# Patient Record
Sex: MALE | Race: WHITE | URBAN - METROPOLITAN AREA
[De-identification: names, ages, dates, MRNs, and addresses within clinical notes are randomized per-mention and may not be internally consistent; named-entity substitution may affect disease eponyms.]

---

## 2017-03-22 ENCOUNTER — OUTPATIENT (OUTPATIENT)
Dept: OUTPATIENT SERVICES | Facility: HOSPITAL | Age: 72
LOS: 1 days | End: 2017-03-22
Payer: MEDICARE

## 2017-03-22 ENCOUNTER — TRANSCRIPTION ENCOUNTER (OUTPATIENT)
Age: 72
End: 2017-03-22

## 2017-03-22 VITALS
HEART RATE: 62 BPM | DIASTOLIC BLOOD PRESSURE: 54 MMHG | OXYGEN SATURATION: 96 % | SYSTOLIC BLOOD PRESSURE: 137 MMHG | RESPIRATION RATE: 15 BRPM

## 2017-03-22 VITALS
DIASTOLIC BLOOD PRESSURE: 61 MMHG | OXYGEN SATURATION: 97 % | HEIGHT: 67 IN | SYSTOLIC BLOOD PRESSURE: 148 MMHG | WEIGHT: 190.7 LBS | HEART RATE: 55 BPM | TEMPERATURE: 98 F | RESPIRATION RATE: 17 BRPM

## 2017-03-22 DIAGNOSIS — H25.11 AGE-RELATED NUCLEAR CATARACT, RIGHT EYE: ICD-10-CM

## 2017-03-22 PROCEDURE — C1889: CPT

## 2017-03-22 PROCEDURE — 66984 XCAPSL CTRC RMVL W/O ECP: CPT | Mod: RT

## 2017-03-22 NOTE — ASU DISCHARGE PLAN (ADULT/PEDIATRIC). - NOTIFY
Pain not relieved by Medications/Bleeding that does not stop/Swelling that continues/Persistent Nausea and Vomiting/Fever greater than 101

## 2017-06-01 ENCOUNTER — APPOINTMENT (OUTPATIENT)
Dept: NEUROSURGERY | Facility: CLINIC | Age: 72
End: 2017-06-01

## 2017-09-01 ENCOUNTER — APPOINTMENT (OUTPATIENT)
Dept: NEUROSURGERY | Facility: CLINIC | Age: 72
End: 2017-09-01
Payer: MEDICARE

## 2017-09-01 DIAGNOSIS — M19.90 UNSPECIFIED OSTEOARTHRITIS, UNSPECIFIED SITE: ICD-10-CM

## 2017-09-01 DIAGNOSIS — S86.812S STRAIN OF OTHER MUSCLE(S) AND TENDON(S) AT LOWER LEG LEVEL, LEFT LEG, SEQUELA: ICD-10-CM

## 2017-09-01 DIAGNOSIS — M24.272 DISORDER OF LIGAMENT, LEFT ANKLE: ICD-10-CM

## 2017-09-01 PROCEDURE — 99213 OFFICE O/P EST LOW 20 MIN: CPT

## 2017-09-01 RX ORDER — BESIFLOXACIN 6 MG/ML
0.6 SUSPENSION OPHTHALMIC
Qty: 5 | Refills: 0 | Status: ACTIVE | COMMUNITY
Start: 2017-03-13

## 2017-09-01 RX ORDER — METOPROLOL SUCCINATE 50 MG/1
50 TABLET, EXTENDED RELEASE ORAL
Qty: 90 | Refills: 0 | Status: ACTIVE | COMMUNITY
Start: 2016-10-06

## 2017-09-01 RX ORDER — CLOBETASOL PROPIONATE 0.5 MG/G
0.05 OINTMENT TOPICAL
Qty: 45 | Refills: 0 | Status: ACTIVE | COMMUNITY
Start: 2016-12-09

## 2017-09-01 RX ORDER — DIFLUPREDNATE 0.5 MG/ML
0.05 EMULSION OPHTHALMIC
Qty: 5 | Refills: 0 | Status: ACTIVE | COMMUNITY
Start: 2017-03-27

## 2018-07-02 ENCOUNTER — INPATIENT (INPATIENT)
Facility: HOSPITAL | Age: 73
LOS: 0 days | Discharge: LEFT BEFORE DISCHARGE | End: 2018-07-03
Attending: HOSPITALIST | Admitting: HOSPITALIST
Payer: MEDICARE

## 2018-07-02 VITALS — WEIGHT: 184.97 LBS | HEIGHT: 71 IN

## 2018-07-02 LAB
ALBUMIN SERPL ELPH-MCNC: 3.6 G/DL — SIGNIFICANT CHANGE UP (ref 3.3–5)
ALP SERPL-CCNC: 57 U/L — SIGNIFICANT CHANGE UP (ref 40–120)
ALT FLD-CCNC: 23 U/L — SIGNIFICANT CHANGE UP (ref 12–78)
ANION GAP SERPL CALC-SCNC: 8 MMOL/L — SIGNIFICANT CHANGE UP (ref 5–17)
APTT BLD: 39.9 SEC — HIGH (ref 27.5–37.4)
AST SERPL-CCNC: 24 U/L — SIGNIFICANT CHANGE UP (ref 15–37)
BASOPHILS # BLD AUTO: 0.02 K/UL — SIGNIFICANT CHANGE UP (ref 0–0.2)
BASOPHILS NFR BLD AUTO: 0.2 % — SIGNIFICANT CHANGE UP (ref 0–2)
BILIRUB SERPL-MCNC: 0.4 MG/DL — SIGNIFICANT CHANGE UP (ref 0.2–1.2)
BUN SERPL-MCNC: 15 MG/DL — SIGNIFICANT CHANGE UP (ref 7–23)
CALCIUM SERPL-MCNC: 8.3 MG/DL — LOW (ref 8.5–10.1)
CHLORIDE SERPL-SCNC: 109 MMOL/L — HIGH (ref 96–108)
CK SERPL-CCNC: 195 U/L — SIGNIFICANT CHANGE UP (ref 26–308)
CO2 SERPL-SCNC: 26 MMOL/L — SIGNIFICANT CHANGE UP (ref 22–31)
CREAT SERPL-MCNC: 0.92 MG/DL — SIGNIFICANT CHANGE UP (ref 0.5–1.3)
EOSINOPHIL # BLD AUTO: 0.13 K/UL — SIGNIFICANT CHANGE UP (ref 0–0.5)
EOSINOPHIL NFR BLD AUTO: 1.4 % — SIGNIFICANT CHANGE UP (ref 0–6)
GLUCOSE SERPL-MCNC: 89 MG/DL — SIGNIFICANT CHANGE UP (ref 70–99)
HCT VFR BLD CALC: 37.9 % — LOW (ref 39–50)
HGB BLD-MCNC: 12.6 G/DL — LOW (ref 13–17)
IMM GRANULOCYTES NFR BLD AUTO: 0.3 % — SIGNIFICANT CHANGE UP (ref 0–1.5)
INR BLD: 2.76 RATIO — HIGH (ref 0.88–1.16)
LACTATE SERPL-SCNC: 1.1 MMOL/L — SIGNIFICANT CHANGE UP (ref 0.7–2)
LYMPHOCYTES # BLD AUTO: 1.96 K/UL — SIGNIFICANT CHANGE UP (ref 1–3.3)
LYMPHOCYTES # BLD AUTO: 21.8 % — SIGNIFICANT CHANGE UP (ref 13–44)
MCHC RBC-ENTMCNC: 31.7 PG — SIGNIFICANT CHANGE UP (ref 27–34)
MCHC RBC-ENTMCNC: 33.2 GM/DL — SIGNIFICANT CHANGE UP (ref 32–36)
MCV RBC AUTO: 95.5 FL — SIGNIFICANT CHANGE UP (ref 80–100)
MONOCYTES # BLD AUTO: 1.19 K/UL — HIGH (ref 0–0.9)
MONOCYTES NFR BLD AUTO: 13.2 % — SIGNIFICANT CHANGE UP (ref 2–14)
NEUTROPHILS # BLD AUTO: 5.66 K/UL — SIGNIFICANT CHANGE UP (ref 1.8–7.4)
NEUTROPHILS NFR BLD AUTO: 63.1 % — SIGNIFICANT CHANGE UP (ref 43–77)
PLATELET # BLD AUTO: 172 K/UL — SIGNIFICANT CHANGE UP (ref 150–400)
POTASSIUM SERPL-MCNC: 3.9 MMOL/L — SIGNIFICANT CHANGE UP (ref 3.5–5.3)
POTASSIUM SERPL-SCNC: 3.9 MMOL/L — SIGNIFICANT CHANGE UP (ref 3.5–5.3)
PROT SERPL-MCNC: 7 GM/DL — SIGNIFICANT CHANGE UP (ref 6–8.3)
PROTHROM AB SERPL-ACNC: 30.4 SEC — HIGH (ref 9.8–12.7)
RBC # BLD: 3.97 M/UL — LOW (ref 4.2–5.8)
RBC # FLD: 13.7 % — SIGNIFICANT CHANGE UP (ref 10.3–14.5)
SODIUM SERPL-SCNC: 143 MMOL/L — SIGNIFICANT CHANGE UP (ref 135–145)
TROPONIN I SERPL-MCNC: <0.015 NG/ML — SIGNIFICANT CHANGE UP (ref 0.01–0.04)
WBC # BLD: 8.99 K/UL — SIGNIFICANT CHANGE UP (ref 3.8–10.5)
WBC # FLD AUTO: 8.99 K/UL — SIGNIFICANT CHANGE UP (ref 3.8–10.5)

## 2018-07-02 PROCEDURE — 99285 EMERGENCY DEPT VISIT HI MDM: CPT

## 2018-07-02 PROCEDURE — 70450 CT HEAD/BRAIN W/O DYE: CPT | Mod: 26

## 2018-07-02 PROCEDURE — 71045 X-RAY EXAM CHEST 1 VIEW: CPT | Mod: 26

## 2018-07-02 RX ORDER — LEVETIRACETAM 250 MG/1
1000 TABLET, FILM COATED ORAL ONCE
Qty: 0 | Refills: 0 | Status: COMPLETED | OUTPATIENT
Start: 2018-07-02 | End: 2018-07-02

## 2018-07-02 RX ADMIN — LEVETIRACETAM 400 MILLIGRAM(S): 250 TABLET, FILM COATED ORAL at 22:06

## 2018-07-02 NOTE — ED PROVIDER NOTE - PHYSICAL EXAMINATION
Constitutional: mild distress AAOx3  Eyes: PERRLA EOMI  Head: Normocephalic atraumatic  Mouth: MMM  Cardiac: regular rate   Resp: Lungs CTAB  GI: Abd s/nt/nd  Neuro: CN2-12 intact normal strength sensation and coordination  Skin: No rashes

## 2018-07-02 NOTE — ED PROVIDER NOTE - NS ED ROS FT
Constitutional: No fever or chills  Eyes: No visual changes  HEENT: No throat pain  CV: No chest pain  Resp: No SOB no cough  GI: No abd pain, nausea or vomiting  : No dysuria  MSK: No musculoskeletal pain  Skin: No rash  Neuro: + headache + seizure

## 2018-07-02 NOTE — ED ADULT NURSE NOTE - PLAN OF CARE
Call bell/Explanation of exam/test/Seizure precautions/Bedside visitors/Position of comfort/Fall precautions/Side rails

## 2018-07-02 NOTE — ED PROVIDER NOTE - MEDICAL DECISION MAKING DETAILS
74 y/o male with a PMHx of psoriatic arthritis, knee replacement, back surgery, arrythmia, Afib, CVA on Coumadin presents to the ED c/o seizures. Pt notes he was flying back from [place] 2 days ago, went to use the restroom and came back to the seat. As per wife, pt began to start twitching and moving upper extremities. A physician on board laid the patient down and plane had emergency landing. Pt was taken to [facility] with no significant findings. This morning, pt was getting ready to see cardiologist and had another seizure although was lucid and speaking to wife during the episode. As per wife, episode lasted 1 minute. After showering, pt then fell on the floor, he states he fell from slipping on the mat. Pt then saw Neuro today and had another seizure while at the office. Neuro reports pt had lip smacking, right gaze deviation and abnormal movement of left hand, episode lasting 1 minute with no post-ictal period. Neuro sent pt to ED and is requesting MRI and EEG. Pt is c/o +HA +post ictal confusion after 1st episode +numbness in upper extremity, not currently experiencing. Denies SOB, CP, fever, abd pain, NVD, urinary incontinence, or tongue biting during seizure FMHx of CVA. PCP Dr. Palacio Cardio Dr. Petit Neuro Dr. Headley On exam, pt with cranial nerves II-VII intact, normal strength, sensation and coordination, abd soft and nontender, regular rate and rhythm, no LE edema. Given witnessed seizure by neurologist, will obtain labs, CT head and admit for EEG and MRI.

## 2018-07-02 NOTE — ED ADULT NURSE NOTE - EXPLANATION OF PATIENT'S REASON FOR LEAVING
pt states he does not want to be admitted, pt states he feels that he was not having seizures, pt's wife states she can have him follow up with a neurologist once he leaves the emergency room

## 2018-07-02 NOTE — ED PROVIDER NOTE - NS_ ATTENDINGSCRIBEDETAILS _ED_A_ED_FT
I, Jeremi Martinez MD,  performed the initial face to face bedside interview with this patient regarding history of present illness, review of symptoms and relevant past medical, social and family history.  I completed an independent physical examination.  I was the initial provider who evaluated this patient.  The history, relevant review of systems, past medical and surgical history, medical decision making, and physical examination was documented by the scribe in my presence and I attest to the accuracy of the documentation.

## 2018-07-02 NOTE — ED ADULT NURSE NOTE - CHIEF COMPLAINT QUOTE
Pt. to the ED BIB Spouse C/O seizures - Pt. sent by PCP for further evaluation of Seizures that started on 6/30- Wife states he was evaluated in [facility] and was D/C home to F/U with PCP- Pt. referenced to the ED for MRI and Possible EGG- Wife denies seizure hx , but states patient has been having seizures everyday since 6/30/2018- Pt. states he had mechanical fall this morning but landed on hands- denies hitting head and LOC, but denies it was related to seizure activity and wife confirms- + cardiac hx on Coumadin-

## 2018-07-02 NOTE — ED PROVIDER NOTE - OBJECTIVE STATEMENT
72 y/o male with a PMHx of psoriatic arthritis, knee replacement, back surgery, arrythmia, Afib, CVA on Coumadin presents to the ED c/o seizures. Pt notes he was flying back from [place] 2 days ago, went to use the restroom and came back to the seat. As per wife, pt began to start twitching and moving upper extremities. A physician on board laid the patient down and plane had emergency landing. Pt was taken to [facility] with no significant findings. This morning, pt was getting ready to see cardiologist and had another seizure although was lucid and speaking to wife during the episode. As per wife, episode lasted 1 minute. After showering, pt then fell on the floor, he states he fell from slipping on the mat. Pt then saw Neuro today and had another seizure while at the office. Neuro reports pt had lip smacking, right gaze deviation and abnormal movement of left hand, episode lasting 1 minute with no post-ictal period. Neuro sent pt to ED and is requesting MRI and EEG. Pt is c/o +HA +post ictal confusion after 1st episode +numbness in upper extremity, not currently experiencing. Denies SOB, CP, fever, abd pain, NVD, urinary incontinence, or tongue biting during seizure FMHx of CVA. PCP Dr. Palacio Cardio Dr. Petit Neuro Dr. Headley

## 2018-07-02 NOTE — ED ADULT NURSE NOTE - CHPI ED SYMPTOMS NEG
no numbness/no change in level of consciousness/no weakness/no confusion/no vomiting/no dizziness/no fever/no blurred vision/no loss of consciousness/no nausea

## 2018-07-03 VITALS
TEMPERATURE: 99 F | OXYGEN SATURATION: 100 % | SYSTOLIC BLOOD PRESSURE: 168 MMHG | RESPIRATION RATE: 17 BRPM | HEART RATE: 69 BPM | DIASTOLIC BLOOD PRESSURE: 87 MMHG

## 2018-07-03 RX ORDER — OXYCODONE HYDROCHLORIDE 5 MG/1
10 TABLET ORAL EVERY 8 HOURS
Qty: 0 | Refills: 0 | Status: DISCONTINUED | OUTPATIENT
Start: 2018-07-03 | End: 2018-07-03

## 2018-07-03 RX ORDER — METOPROLOL TARTRATE 50 MG
50 TABLET ORAL DAILY
Qty: 0 | Refills: 0 | Status: DISCONTINUED | OUTPATIENT
Start: 2018-07-03 | End: 2018-07-03

## 2018-07-03 RX ORDER — ACETAMINOPHEN 500 MG
650 TABLET ORAL EVERY 6 HOURS
Qty: 0 | Refills: 0 | Status: DISCONTINUED | OUTPATIENT
Start: 2018-07-03 | End: 2018-07-03

## 2018-07-03 RX ORDER — METHOTREXATE 2.5 MG/1
20 TABLET ORAL
Qty: 0 | Refills: 0 | Status: DISCONTINUED | OUTPATIENT
Start: 2018-07-03 | End: 2018-07-03

## 2018-07-03 RX ORDER — FOLIC ACID 0.8 MG
1 TABLET ORAL DAILY
Qty: 0 | Refills: 0 | Status: DISCONTINUED | OUTPATIENT
Start: 2018-07-03 | End: 2018-07-03

## 2018-07-03 RX ORDER — WARFARIN SODIUM 2.5 MG/1
3.5 TABLET ORAL DAILY
Qty: 0 | Refills: 0 | Status: DISCONTINUED | OUTPATIENT
Start: 2018-07-03 | End: 2018-07-03

## 2018-07-03 RX ORDER — GABAPENTIN 400 MG/1
300 CAPSULE ORAL DAILY
Qty: 0 | Refills: 0 | Status: DISCONTINUED | OUTPATIENT
Start: 2018-07-03 | End: 2018-07-03

## 2018-07-03 RX ORDER — SIMVASTATIN 20 MG/1
40 TABLET, FILM COATED ORAL AT BEDTIME
Qty: 0 | Refills: 0 | Status: DISCONTINUED | OUTPATIENT
Start: 2018-07-03 | End: 2018-07-03

## 2018-07-03 RX ORDER — ACETAMINOPHEN 500 MG
650 TABLET ORAL ONCE
Qty: 0 | Refills: 0 | Status: COMPLETED | OUTPATIENT
Start: 2018-07-03 | End: 2018-07-03

## 2018-07-03 NOTE — H&P ADULT - ASSESSMENT
73 M pmh Afib on coumadin, Psoriatic arthritis on MTX, back surgery/knee surgery on chronic pain meds, p/w new onset seizures. Recent flight from [place] approx 48 hrs ago where he was examined by physician on board noted apparent seizureliek activity. Emergency landing was made and was tx at [facility]. Underwent 2 head CT and discharged w/o any meds per patient. Today was scheduled to follow up with neurologist for first time visit with Dr hinojosa where he has witnessed seizure described as lip smacking, right gaze deviation and L arm/hand tremor lasting approx 1 min. Wife stated this am she beleives he also had seizure at home that lasted approx 1 min and was confused and at the time he complaiend of UE numbness abd HA. He presently has no complaints and is eager to go home.   He denies any new meds. He denies fevers. Visual disturbances, No N/V/D, No urinary incontinence. He is upset that he needs to undergo further evaluation and wants to leave at once. I have explained all the risks towards leaving without further evaluation which include injury (as patient admits to driving) and injury to others and death. He is alert and oriented x 3.    I have notified SW and patients wife who will further discuss the matter with the patient. Should he leave, patient has been informed it will be AMA. Wife has been strictly advised that patient cannot drive. Patient also aware that driving is not permitted and was strictly enforced in the presence of ED nurse Teodora.       IF patient decided to be admitted:    A:  New onset seizures of unknown etiology  Afib on coumadin  PA on MTX  CBP    P:  admit to med surg  MRI head, EEG, neuro consult  frequent neuro checks, neuro/fall precautions. CTH WNL  Kepppra 500 BID, ativan PRN for breakthrough seizures  INR therapeutic

## 2018-07-03 NOTE — H&P ADULT - HISTORY OF PRESENT ILLNESS
73 M pmh Afib on coumadin, Psoriatic arthritis on MTX, back surgery/knee surgery on chronic pain meds, p/w new onset seizures. Recent flight from [place] approx 48 hrs ago where he was examined by physician on board noted apparent seizureliek activity. Emergency landing was made and was tx at [facility]. Underwent 2 head CT and discharged w/o any meds per patient. Today was scheduled to follow up with neurologist for first time visit with Dr hinojosa where he has witnessed seizure described as lip smacking, right gaze deviation and L arm/hand tremor lasting approx 1 min. Wife stated this am she beleives he also had seizure at home that lasted approx 1 min and was confused and at the time he complaiend of UE numbness abd HA. He presently has no complaints and is eager to go home.   He denies any new meds. He denies fevers. Visual disturbances, No N/V/D, No urinary incontinence. He is upset that he needs to undergo further evaluation and wants to leave at once. I have explained all the risks towards leaving without further evaluation which include injury (as patient admits to driving) and injury to others and death. He is alert and oriented x 3.    I have notified SW and patients wife who will further discuss the matter with the patient. Should he leave, patient has been informed it will be AMA. Wife has been strictly advised that patient cannot drive. Patient also aware that driving is not permitted and was strictly enforced in the presence of ED nurse Teodora.       social: former smoker, social etoh, no drug  Shx: knee and back surgery  Fhx: family history of CVA

## 2018-07-03 NOTE — H&P ADULT - NSHPPHYSICALEXAM_GEN_ALL_CORE
PHYSICAL EXAM:    Constitutional: NAD, awake and alert, well-developed  HEENT: PERR, EOMI, Normal Hearing, MMM  Neck: Soft and supple, No LAD, No JVD  Respiratory: Breath sounds are clear bilaterally, No wheezing, rales or rhonchi  Cardiovascular: S1 and S2, regular rate and rhythm, no Murmurs, gallops or rubs  Gastrointestinal: Bowel Sounds present, soft, nontender, nondistended, no guarding, no rebound  Extremities: No peripheral edema  Vascular: 2+ peripheral pulses  Neurological: A/O x 3, no focal deficits, CN II_XII in tact, no gross motor deficits  Musculoskeletal: 5/5 strength b/l upper and lower extremities  Skin: No rashes

## 2018-07-03 NOTE — H&P ADULT - NSHPLABSRESULTS_GEN_ALL_CORE
LABS: All Labs Reviewed:                        12.6   8.99  )-----------( 172      ( 02 Jul 2018 20:16 )             37.9     07-02    143  |  109<H>  |  15  ----------------------------<  89  3.9   |  26  |  0.92    Ca    8.3<L>      02 Jul 2018 20:16    TPro  7.0  /  Alb  3.6  /  TBili  0.4  /  DBili  x   /  AST  24  /  ALT  23  /  AlkPhos  57  07-02    PT/INR - ( 02 Jul 2018 20:16 )   PT: 30.4 sec;   INR: 2.76 ratio         PTT - ( 02 Jul 2018 20:16 )  PTT:39.9 sec  CARDIAC MARKERS ( 02 Jul 2018 20:16 )  <0.015 ng/mL / x     / 195 U/L / x     / x              Blood Culture:

## 2018-07-10 DIAGNOSIS — Z79.01 LONG TERM (CURRENT) USE OF ANTICOAGULANTS: ICD-10-CM

## 2018-07-10 DIAGNOSIS — Z86.73 PERSONAL HISTORY OF TRANSIENT ISCHEMIC ATTACK (TIA), AND CEREBRAL INFARCTION WITHOUT RESIDUAL DEFICITS: ICD-10-CM

## 2018-07-10 DIAGNOSIS — Z96.659 PRESENCE OF UNSPECIFIED ARTIFICIAL KNEE JOINT: ICD-10-CM

## 2018-07-10 DIAGNOSIS — Z87.891 PERSONAL HISTORY OF NICOTINE DEPENDENCE: ICD-10-CM

## 2018-07-10 DIAGNOSIS — I48.0 PAROXYSMAL ATRIAL FIBRILLATION: ICD-10-CM

## 2018-07-10 DIAGNOSIS — R56.9 UNSPECIFIED CONVULSIONS: ICD-10-CM

## 2018-07-10 DIAGNOSIS — L40.50 ARTHROPATHIC PSORIASIS, UNSPECIFIED: ICD-10-CM

## 2020-08-27 NOTE — H&P ADULT - PMH
b/l lower extremity twitching. progressed into full body tremors. HR 120s. VSS. pupils dilated Atrial fibrillation  paroxysmal  Cardiac arrhythmia  PAT  Cerebrovascular accident  2011

## 2020-10-23 PROBLEM — I49.9 CARDIAC ARRHYTHMIA, UNSPECIFIED: Chronic | Status: ACTIVE | Noted: 2017-03-22

## 2020-10-23 PROBLEM — I48.91 UNSPECIFIED ATRIAL FIBRILLATION: Chronic | Status: ACTIVE | Noted: 2017-03-22

## 2020-10-27 ENCOUNTER — OUTPATIENT (OUTPATIENT)
Dept: OUTPATIENT SERVICES | Facility: HOSPITAL | Age: 75
LOS: 1 days | End: 2020-10-27
Payer: MEDICARE

## 2020-10-27 DIAGNOSIS — M12.261 VILLONODULAR SYNOVITIS (PIGMENTED), RIGHT KNEE: ICD-10-CM

## 2020-10-27 PROCEDURE — 78830 RP LOCLZJ TUM SPECT W/CT 1: CPT | Mod: 26

## 2020-10-27 PROCEDURE — A9561: CPT

## 2020-10-27 PROCEDURE — 78315 BONE IMAGING 3 PHASE: CPT | Mod: 26

## 2020-10-27 PROCEDURE — 78830 RP LOCLZJ TUM SPECT W/CT 1: CPT

## 2020-10-27 PROCEDURE — 78315 BONE IMAGING 3 PHASE: CPT

## 2020-10-28 DIAGNOSIS — M12.261 VILLONODULAR SYNOVITIS (PIGMENTED), RIGHT KNEE: ICD-10-CM

## 2021-05-12 ENCOUNTER — OUTPATIENT (OUTPATIENT)
Dept: OUTPATIENT SERVICES | Facility: HOSPITAL | Age: 76
LOS: 1 days | End: 2021-05-12
Payer: MEDICARE

## 2021-05-12 VITALS
DIASTOLIC BLOOD PRESSURE: 60 MMHG | RESPIRATION RATE: 16 BRPM | WEIGHT: 190.92 LBS | HEART RATE: 70 BPM | HEIGHT: 69 IN | TEMPERATURE: 98 F | OXYGEN SATURATION: 96 % | SYSTOLIC BLOOD PRESSURE: 100 MMHG

## 2021-05-12 DIAGNOSIS — M17.12 UNILATERAL PRIMARY OSTEOARTHRITIS, LEFT KNEE: ICD-10-CM

## 2021-05-12 DIAGNOSIS — Z01.818 ENCOUNTER FOR OTHER PREPROCEDURAL EXAMINATION: ICD-10-CM

## 2021-05-12 DIAGNOSIS — Z98.49 CATARACT EXTRACTION STATUS, UNSPECIFIED EYE: Chronic | ICD-10-CM

## 2021-05-12 DIAGNOSIS — Z29.9 ENCOUNTER FOR PROPHYLACTIC MEASURES, UNSPECIFIED: ICD-10-CM

## 2021-05-12 DIAGNOSIS — Z96.651 PRESENCE OF RIGHT ARTIFICIAL KNEE JOINT: Chronic | ICD-10-CM

## 2021-05-12 DIAGNOSIS — Z90.89 ACQUIRED ABSENCE OF OTHER ORGANS: Chronic | ICD-10-CM

## 2021-05-12 DIAGNOSIS — Z98.1 ARTHRODESIS STATUS: Chronic | ICD-10-CM

## 2021-05-12 LAB
A1C WITH ESTIMATED AVERAGE GLUCOSE RESULT: 5.8 % — HIGH (ref 4–5.6)
ANION GAP SERPL CALC-SCNC: 4 MMOL/L — LOW (ref 5–17)
APTT BLD: 35.9 SEC — HIGH (ref 27.5–35.5)
BASOPHILS # BLD AUTO: 0.03 K/UL — SIGNIFICANT CHANGE UP (ref 0–0.2)
BASOPHILS NFR BLD AUTO: 0.4 % — SIGNIFICANT CHANGE UP (ref 0–2)
BUN SERPL-MCNC: 22 MG/DL — SIGNIFICANT CHANGE UP (ref 7–23)
CALCIUM SERPL-MCNC: 8.8 MG/DL — SIGNIFICANT CHANGE UP (ref 8.5–10.1)
CHLORIDE SERPL-SCNC: 106 MMOL/L — SIGNIFICANT CHANGE UP (ref 96–108)
CO2 SERPL-SCNC: 27 MMOL/L — SIGNIFICANT CHANGE UP (ref 22–31)
CREAT SERPL-MCNC: 0.92 MG/DL — SIGNIFICANT CHANGE UP (ref 0.5–1.3)
EOSINOPHIL # BLD AUTO: 0.23 K/UL — SIGNIFICANT CHANGE UP (ref 0–0.5)
EOSINOPHIL NFR BLD AUTO: 3.2 % — SIGNIFICANT CHANGE UP (ref 0–6)
ESTIMATED AVERAGE GLUCOSE: 120 MG/DL — HIGH (ref 68–114)
GLUCOSE SERPL-MCNC: 94 MG/DL — SIGNIFICANT CHANGE UP (ref 70–99)
HCT VFR BLD CALC: 41.6 % — SIGNIFICANT CHANGE UP (ref 39–50)
HGB BLD-MCNC: 13.6 G/DL — SIGNIFICANT CHANGE UP (ref 13–17)
IMM GRANULOCYTES NFR BLD AUTO: 0.4 % — SIGNIFICANT CHANGE UP (ref 0–1.5)
INR BLD: 1.39 RATIO — HIGH (ref 0.88–1.16)
LYMPHOCYTES # BLD AUTO: 1.17 K/UL — SIGNIFICANT CHANGE UP (ref 1–3.3)
LYMPHOCYTES # BLD AUTO: 16.4 % — SIGNIFICANT CHANGE UP (ref 13–44)
MCHC RBC-ENTMCNC: 31.8 PG — SIGNIFICANT CHANGE UP (ref 27–34)
MCHC RBC-ENTMCNC: 32.7 GM/DL — SIGNIFICANT CHANGE UP (ref 32–36)
MCV RBC AUTO: 97.2 FL — SIGNIFICANT CHANGE UP (ref 80–100)
MONOCYTES # BLD AUTO: 0.76 K/UL — SIGNIFICANT CHANGE UP (ref 0–0.9)
MONOCYTES NFR BLD AUTO: 10.7 % — SIGNIFICANT CHANGE UP (ref 2–14)
MRSA PCR RESULT.: SIGNIFICANT CHANGE UP
NEUTROPHILS # BLD AUTO: 4.9 K/UL — SIGNIFICANT CHANGE UP (ref 1.8–7.4)
NEUTROPHILS NFR BLD AUTO: 68.9 % — SIGNIFICANT CHANGE UP (ref 43–77)
PLATELET # BLD AUTO: 157 K/UL — SIGNIFICANT CHANGE UP (ref 150–400)
POTASSIUM SERPL-MCNC: 4.6 MMOL/L — SIGNIFICANT CHANGE UP (ref 3.5–5.3)
POTASSIUM SERPL-SCNC: 4.6 MMOL/L — SIGNIFICANT CHANGE UP (ref 3.5–5.3)
PROTHROM AB SERPL-ACNC: 15.9 SEC — HIGH (ref 10.6–13.6)
RBC # BLD: 4.28 M/UL — SIGNIFICANT CHANGE UP (ref 4.2–5.8)
RBC # FLD: 12.9 % — SIGNIFICANT CHANGE UP (ref 10.3–14.5)
S AUREUS DNA NOSE QL NAA+PROBE: SIGNIFICANT CHANGE UP
SODIUM SERPL-SCNC: 137 MMOL/L — SIGNIFICANT CHANGE UP (ref 135–145)
WBC # BLD: 7.12 K/UL — SIGNIFICANT CHANGE UP (ref 3.8–10.5)
WBC # FLD AUTO: 7.12 K/UL — SIGNIFICANT CHANGE UP (ref 3.8–10.5)

## 2021-05-12 PROCEDURE — 86900 BLOOD TYPING SEROLOGIC ABO: CPT

## 2021-05-12 PROCEDURE — 85730 THROMBOPLASTIN TIME PARTIAL: CPT

## 2021-05-12 PROCEDURE — 71046 X-RAY EXAM CHEST 2 VIEWS: CPT | Mod: 26

## 2021-05-12 PROCEDURE — 83036 HEMOGLOBIN GLYCOSYLATED A1C: CPT

## 2021-05-12 PROCEDURE — 86901 BLOOD TYPING SEROLOGIC RH(D): CPT

## 2021-05-12 PROCEDURE — 86850 RBC ANTIBODY SCREEN: CPT

## 2021-05-12 PROCEDURE — 85610 PROTHROMBIN TIME: CPT

## 2021-05-12 PROCEDURE — 93010 ELECTROCARDIOGRAM REPORT: CPT

## 2021-05-12 PROCEDURE — 93005 ELECTROCARDIOGRAM TRACING: CPT

## 2021-05-12 PROCEDURE — 85025 COMPLETE CBC W/AUTO DIFF WBC: CPT

## 2021-05-12 PROCEDURE — 82040 ASSAY OF SERUM ALBUMIN: CPT

## 2021-05-12 PROCEDURE — G0463: CPT | Mod: 25

## 2021-05-12 PROCEDURE — 71046 X-RAY EXAM CHEST 2 VIEWS: CPT

## 2021-05-12 PROCEDURE — 36415 COLL VENOUS BLD VENIPUNCTURE: CPT

## 2021-05-12 PROCEDURE — 87640 STAPH A DNA AMP PROBE: CPT

## 2021-05-12 PROCEDURE — 80048 BASIC METABOLIC PNL TOTAL CA: CPT

## 2021-05-12 PROCEDURE — 87641 MR-STAPH DNA AMP PROBE: CPT

## 2021-05-12 RX ORDER — HYDROCORTISONE 1 %
0 OINTMENT (GRAM) TOPICAL
Qty: 0 | Refills: 0 | DISCHARGE

## 2021-05-12 RX ORDER — ACETAMINOPHEN 500 MG
1 TABLET ORAL
Qty: 0 | Refills: 0 | DISCHARGE

## 2021-05-12 RX ORDER — METHOTREXATE 2.5 MG/1
20 TABLET ORAL
Qty: 0 | Refills: 0 | DISCHARGE

## 2021-05-12 RX ORDER — WARFARIN SODIUM 2.5 MG/1
3.5 TABLET ORAL
Qty: 0 | Refills: 0 | DISCHARGE

## 2021-05-12 RX ORDER — GABAPENTIN 400 MG/1
1 CAPSULE ORAL
Qty: 0 | Refills: 0 | DISCHARGE

## 2021-05-12 RX ORDER — METOPROLOL TARTRATE 50 MG
1 TABLET ORAL
Qty: 0 | Refills: 0 | DISCHARGE

## 2021-05-12 NOTE — H&P PST ADULT - ASSESSMENT
76 year old female with OA left knee c/o left knee pain with ROM which wakes him up from sleep ; takes oxycodone for pain; he presents to PST for planned Left TKR     Plan:  1. PST instructions given ; NPO status instructions to be given by ASU   2. Pt instructed to take following meds with sip of water : metoprolol keppra   3. Pt instructed to take routine evening medications unless indicated   4. Stop NSAIDS ( Aspirin Alev Motrin Mobic Diclofenac), herbal supplements , MVI , Vitamin fish oil 7 days prior to surgery  unless   directed by surgeon or cardiologist;   5. Medical Optimization  with Dr Bruno Palacio Cardio Dr Brandt   6. EZ wash instructions given & mupirocin instructions given  7. Labs EKG CXR as per surgeon request   8. Pt instructed to self quarantine after Covid test   9. Covid Testing scheduled Pt notified and aware  10. Pt denies covid symptoms shortness of breath fever cough       CAPRINI SCORE [CLOT]    AGE RELATED RISK FACTORS                                                       MOBILITY RELATED FACTORS  [ ] Age 41-60 years                                            (1 Point)                  [ ] Bed rest                                                        (1 Point)  [ ] Age: 61-74 years                                           (2 Points)                 [ ] Plaster cast                                                   (2 Points)  [x ] Age= 75 years                                              (3 Points)                 [ ] Bed bound for more than 72 hours                 (2 Points)    DISEASE RELATED RISK FACTORS                                               GENDER SPECIFIC FACTORS  [ ] Edema in the lower extremities                       (1 Point)                  [ ] Pregnancy                                                     (1 Point)  [ ] Varicose veins                                               (1 Point)                  [ ] Post-partum < 6 weeks                                   (1 Point)             [x ] BMI > 25 Kg/m2                                            (1 Point)                  [ ] Hormonal therapy  or oral contraception          (1 Point)                 [ ] Sepsis (in the previous month)                        (1 Point)                  [ ] History of pregnancy complications                 (1 point)  [ ] Pneumonia or serious lung disease                                               [ ] Unexplained or recurrent                     (1 Point)           (in the previous month)                               (1 Point)  [ ] Abnormal pulmonary function test                     (1 Point)                 SURGERY RELATED RISK FACTORS  [ ] Acute myocardial infarction                              (1 Point)                 [ ]  Section                                             (1 Point)  [ ] Congestive heart failure (in the previous month)  (1 Point)               [ ] Minor surgery                                                  (1 Point)   [ ] Inflammatory bowel disease                             (1 Point)                 [ ] Arthroscopic surgery                                        (2 Points)  [ ] Central venous access                                      (2 Points)                [ ] General surgery lasting more than 45 minutes   (2 Points)       [ ] Stroke (in the previous month)                          (5 Points)               [x ] Elective arthroplasty                                         (5 Points)            ( ) presents and past malignancy                           (2 points)                                                                                                         HEMATOLOGY RELATED FACTORS                                                 TRAUMA RELATED RISK FACTORS  [ ] Prior episodes of VTE                                     (3 Points)                 [ ] Fracture of the hip, pelvis, or leg                       (5 Points)  [ ] Positive family history for VTE                         (3 Points)                 [ ] Acute spinal cord injury (in the previous month)  (5 Points)  [ ] Prothrombin 34432 A                                     (3 Points)                 [ ] Paralysis  (less than 1 month)                             (5 Points)  [ ] Factor V Leiden                                             (3 Points)                  [ ] Multiple Trauma within 1 month                        (5 Points)  [ ] Lupus anticoagulants                                     (3 Points)                                                           [ ] Anticardiolipin antibodies                               (3 Points)                                                       [ ] High homocysteine in the blood                      (3 Points)                                             [ ] Other congenital or acquired thrombophilia      (3 Points)                                                [ ] Heparin induced thrombocytopenia                  (3 Points)                                          Total Score [     9     ]   76 year old female with OA left knee c/o left knee pain with ROM which wakes him up from sleep ; takes oxycodone for pain; he presents to PST for planned Left TKR     Plan:  1. PST instructions given ; NPO status instructions to be given by ASU   2. Pt instructed to take following meds with sip of water : metoprolol keppra   3. Pt instructed to take routine evening medications unless indicated   4. Stop NSAIDS ( Aspirin Alev Motrin Mobic Diclofenac), herbal supplements , MVI , Vitamin fish oil 7 days prior to surgery  unless   directed by surgeon or cardiologist;   5. Medical Optimization  with Dr Bruno Palacio Cardio Dr Brandt   6. EZ wash instructions given & mupirocin instructions given  7. Labs EKG CXR as per surgeon request   8. Pt instructed to self quarantine after Covid test   9. Covid Testing scheduled Pt notified and aware  10. Pt denies covid symptoms shortness of breath fever cough   11. OR booking notified Opiate dependent Martha aware      CAPRINI SCORE [CLOT]    AGE RELATED RISK FACTORS                                                       MOBILITY RELATED FACTORS  [ ] Age 41-60 years                                            (1 Point)                  [ ] Bed rest                                                        (1 Point)  [ ] Age: 61-74 years                                           (2 Points)                 [ ] Plaster cast                                                   (2 Points)  [x ] Age= 75 years                                              (3 Points)                 [ ] Bed bound for more than 72 hours                 (2 Points)    DISEASE RELATED RISK FACTORS                                               GENDER SPECIFIC FACTORS  [ ] Edema in the lower extremities                       (1 Point)                  [ ] Pregnancy                                                     (1 Point)  [ ] Varicose veins                                               (1 Point)                  [ ] Post-partum < 6 weeks                                   (1 Point)             [x ] BMI > 25 Kg/m2                                            (1 Point)                  [ ] Hormonal therapy  or oral contraception          (1 Point)                 [ ] Sepsis (in the previous month)                        (1 Point)                  [ ] History of pregnancy complications                 (1 point)  [ ] Pneumonia or serious lung disease                                               [ ] Unexplained or recurrent                     (1 Point)           (in the previous month)                               (1 Point)  [ ] Abnormal pulmonary function test                     (1 Point)                 SURGERY RELATED RISK FACTORS  [ ] Acute myocardial infarction                              (1 Point)                 [ ]  Section                                             (1 Point)  [ ] Congestive heart failure (in the previous month)  (1 Point)               [ ] Minor surgery                                                  (1 Point)   [ ] Inflammatory bowel disease                             (1 Point)                 [ ] Arthroscopic surgery                                        (2 Points)  [ ] Central venous access                                      (2 Points)                [ ] General surgery lasting more than 45 minutes   (2 Points)       [ ] Stroke (in the previous month)                          (5 Points)               [x ] Elective arthroplasty                                         (5 Points)            ( ) presents and past malignancy                           (2 points)                                                                                                         HEMATOLOGY RELATED FACTORS                                                 TRAUMA RELATED RISK FACTORS  [ ] Prior episodes of VTE                                     (3 Points)                 [ ] Fracture of the hip, pelvis, or leg                       (5 Points)  [ ] Positive family history for VTE                         (3 Points)                 [ ] Acute spinal cord injury (in the previous month)  (5 Points)  [ ] Prothrombin 68017 A                                     (3 Points)                 [ ] Paralysis  (less than 1 month)                             (5 Points)  [ ] Factor V Leiden                                             (3 Points)                  [ ] Multiple Trauma within 1 month                        (5 Points)  [ ] Lupus anticoagulants                                     (3 Points)                                                           [ ] Anticardiolipin antibodies                               (3 Points)                                                       [ ] High homocysteine in the blood                      (3 Points)                                             [ ] Other congenital or acquired thrombophilia      (3 Points)                                                [ ] Heparin induced thrombocytopenia                  (3 Points)                                          Total Score [     9     ]

## 2021-05-12 NOTE — H&P PST ADULT - NSICDXFAMILYHX_GEN_ALL_CORE_FT
FAMILY HISTORY:  Father  Still living? Unknown  FH: arthritis, Age at diagnosis: Age Unknown  FH: stroke, Age at diagnosis: Age Unknown

## 2021-05-12 NOTE — H&P PST ADULT - NSICDXPASTMEDICALHX_GEN_ALL_CORE_FT
PAST MEDICAL HISTORY:  Atrial fibrillation paroxysmal    Cardiac arrhythmia PAT    Cerebrovascular accident 2011 deficit: balance dizziness and weakness both hands    Psoriatic arthritis     Seizure disorder last seizure 2019     PAST MEDICAL HISTORY:  Atrial fibrillation paroxysmal    Cardiac arrhythmia PAT    Cerebrovascular accident 2011 deficit: balance dizziness and weakness both hands    Psoriatic arthritis     Rib fracture left side  Last week of April 2021    Seizure disorder last seizure 2019

## 2021-05-12 NOTE — H&P PST ADULT - NSICDXPASTSURGICALHX_GEN_ALL_CORE_FT
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

## 2021-05-12 NOTE — H&P PST ADULT - HEALTH CARE MAINTENANCE
covid vaccine x2 Moderna   [place] 14 days Pt denies  travel internationally for the past 21 days

## 2021-05-12 NOTE — H&P PST ADULT - NSANTHOSAYNRD_GEN_A_CORE
No. VICTORIA screening performed.  STOP BANG Legend: 0-2 = LOW Risk; 3-4 = INTERMEDIATE Risk; 5-8 = HIGH Risk

## 2021-05-12 NOTE — H&P PST ADULT - HISTORY OF PRESENT ILLNESS
76 year old female with OA left knee c/o left knee pain with ROM which wakes him up from sleep ; takes oxycodone for pain; he presents to PST for planned Left TKR

## 2021-05-13 DIAGNOSIS — M17.12 UNILATERAL PRIMARY OSTEOARTHRITIS, LEFT KNEE: ICD-10-CM

## 2021-05-13 DIAGNOSIS — Z01.818 ENCOUNTER FOR OTHER PREPROCEDURAL EXAMINATION: ICD-10-CM

## 2021-05-16 DIAGNOSIS — Z01.818 ENCOUNTER FOR OTHER PREPROCEDURAL EXAMINATION: ICD-10-CM

## 2021-05-17 ENCOUNTER — APPOINTMENT (OUTPATIENT)
Dept: DISASTER EMERGENCY | Facility: CLINIC | Age: 76
End: 2021-05-17

## 2021-05-18 LAB — SARS-COV-2 N GENE NPH QL NAA+PROBE: NOT DETECTED

## 2021-05-20 ENCOUNTER — INPATIENT (INPATIENT)
Facility: HOSPITAL | Age: 76
LOS: 3 days | Discharge: NURSING, REHAB OR LONG-TERM CARE | DRG: 470 | End: 2021-05-24
Attending: ORTHOPAEDIC SURGERY | Admitting: ORTHOPAEDIC SURGERY
Payer: MEDICARE

## 2021-05-20 ENCOUNTER — TRANSCRIPTION ENCOUNTER (OUTPATIENT)
Age: 76
End: 2021-05-20

## 2021-05-20 ENCOUNTER — RESULT REVIEW (OUTPATIENT)
Age: 76
End: 2021-05-20

## 2021-05-20 VITALS
RESPIRATION RATE: 16 BRPM | SYSTOLIC BLOOD PRESSURE: 122 MMHG | HEART RATE: 50 BPM | HEIGHT: 69 IN | TEMPERATURE: 98 F | WEIGHT: 190.92 LBS | DIASTOLIC BLOOD PRESSURE: 86 MMHG | OXYGEN SATURATION: 98 %

## 2021-05-20 DIAGNOSIS — Z96.651 PRESENCE OF RIGHT ARTIFICIAL KNEE JOINT: Chronic | ICD-10-CM

## 2021-05-20 DIAGNOSIS — Z90.89 ACQUIRED ABSENCE OF OTHER ORGANS: Chronic | ICD-10-CM

## 2021-05-20 DIAGNOSIS — Z98.1 ARTHRODESIS STATUS: Chronic | ICD-10-CM

## 2021-05-20 DIAGNOSIS — Z98.49 CATARACT EXTRACTION STATUS, UNSPECIFIED EYE: Chronic | ICD-10-CM

## 2021-05-20 DIAGNOSIS — M17.12 UNILATERAL PRIMARY OSTEOARTHRITIS, LEFT KNEE: ICD-10-CM

## 2021-05-20 LAB
ANION GAP SERPL CALC-SCNC: 7 MMOL/L — SIGNIFICANT CHANGE UP (ref 5–17)
BUN SERPL-MCNC: 16 MG/DL — SIGNIFICANT CHANGE UP (ref 7–23)
CALCIUM SERPL-MCNC: 9.1 MG/DL — SIGNIFICANT CHANGE UP (ref 8.5–10.1)
CHLORIDE SERPL-SCNC: 108 MMOL/L — SIGNIFICANT CHANGE UP (ref 96–108)
CO2 SERPL-SCNC: 26 MMOL/L — SIGNIFICANT CHANGE UP (ref 22–31)
CREAT SERPL-MCNC: 0.96 MG/DL — SIGNIFICANT CHANGE UP (ref 0.5–1.3)
GLUCOSE SERPL-MCNC: 130 MG/DL — HIGH (ref 70–99)
HCT VFR BLD CALC: 40.1 % — SIGNIFICANT CHANGE UP (ref 39–50)
HGB BLD-MCNC: 12.9 G/DL — LOW (ref 13–17)
MCHC RBC-ENTMCNC: 31.7 PG — SIGNIFICANT CHANGE UP (ref 27–34)
MCHC RBC-ENTMCNC: 32.2 GM/DL — SIGNIFICANT CHANGE UP (ref 32–36)
MCV RBC AUTO: 98.5 FL — SIGNIFICANT CHANGE UP (ref 80–100)
PLATELET # BLD AUTO: 138 K/UL — LOW (ref 150–400)
POTASSIUM SERPL-MCNC: 4.9 MMOL/L — SIGNIFICANT CHANGE UP (ref 3.5–5.3)
POTASSIUM SERPL-SCNC: 4.9 MMOL/L — SIGNIFICANT CHANGE UP (ref 3.5–5.3)
RBC # BLD: 4.07 M/UL — LOW (ref 4.2–5.8)
RBC # FLD: 12.9 % — SIGNIFICANT CHANGE UP (ref 10.3–14.5)
SODIUM SERPL-SCNC: 141 MMOL/L — SIGNIFICANT CHANGE UP (ref 135–145)
WBC # BLD: 11.26 K/UL — HIGH (ref 3.8–10.5)
WBC # FLD AUTO: 11.26 K/UL — HIGH (ref 3.8–10.5)

## 2021-05-20 PROCEDURE — 97116 GAIT TRAINING THERAPY: CPT | Mod: GP

## 2021-05-20 PROCEDURE — 99223 1ST HOSP IP/OBS HIGH 75: CPT

## 2021-05-20 PROCEDURE — 27447 TOTAL KNEE ARTHROPLASTY: CPT | Mod: AS

## 2021-05-20 PROCEDURE — 88305 TISSUE EXAM BY PATHOLOGIST: CPT | Mod: 26

## 2021-05-20 PROCEDURE — 82962 GLUCOSE BLOOD TEST: CPT

## 2021-05-20 PROCEDURE — 86769 SARS-COV-2 COVID-19 ANTIBODY: CPT

## 2021-05-20 PROCEDURE — 88305 TISSUE EXAM BY PATHOLOGIST: CPT

## 2021-05-20 PROCEDURE — U0003: CPT

## 2021-05-20 PROCEDURE — 80048 BASIC METABOLIC PNL TOTAL CA: CPT

## 2021-05-20 PROCEDURE — 97162 PT EVAL MOD COMPLEX 30 MIN: CPT | Mod: GP

## 2021-05-20 PROCEDURE — 97530 THERAPEUTIC ACTIVITIES: CPT | Mod: GP

## 2021-05-20 PROCEDURE — 36415 COLL VENOUS BLD VENIPUNCTURE: CPT

## 2021-05-20 PROCEDURE — 93010 ELECTROCARDIOGRAM REPORT: CPT

## 2021-05-20 PROCEDURE — 85027 COMPLETE CBC AUTOMATED: CPT

## 2021-05-20 PROCEDURE — C1713: CPT

## 2021-05-20 PROCEDURE — 99222 1ST HOSP IP/OBS MODERATE 55: CPT

## 2021-05-20 PROCEDURE — 99024 POSTOP FOLLOW-UP VISIT: CPT

## 2021-05-20 PROCEDURE — C1776: CPT

## 2021-05-20 PROCEDURE — 73560 X-RAY EXAM OF KNEE 1 OR 2: CPT | Mod: 26,LT

## 2021-05-20 PROCEDURE — U0005: CPT

## 2021-05-20 PROCEDURE — 93005 ELECTROCARDIOGRAM TRACING: CPT

## 2021-05-20 PROCEDURE — 73560 X-RAY EXAM OF KNEE 1 OR 2: CPT | Mod: LT

## 2021-05-20 RX ORDER — METOPROLOL TARTRATE 50 MG
50 TABLET ORAL DAILY
Refills: 0 | Status: DISCONTINUED | OUTPATIENT
Start: 2021-05-20 | End: 2021-05-21

## 2021-05-20 RX ORDER — ONDANSETRON 8 MG/1
8 TABLET, FILM COATED ORAL EVERY 8 HOURS
Refills: 0 | Status: DISCONTINUED | OUTPATIENT
Start: 2021-05-20 | End: 2021-05-24

## 2021-05-20 RX ORDER — ONDANSETRON 8 MG/1
4 TABLET, FILM COATED ORAL ONCE
Refills: 0 | Status: DISCONTINUED | OUTPATIENT
Start: 2021-05-20 | End: 2021-05-20

## 2021-05-20 RX ORDER — OXYCODONE HYDROCHLORIDE 5 MG/1
10 TABLET ORAL EVERY 4 HOURS
Refills: 0 | Status: DISCONTINUED | OUTPATIENT
Start: 2021-05-20 | End: 2021-05-24

## 2021-05-20 RX ORDER — HYDROMORPHONE HYDROCHLORIDE 2 MG/ML
0.5 INJECTION INTRAMUSCULAR; INTRAVENOUS; SUBCUTANEOUS
Refills: 0 | Status: DISCONTINUED | OUTPATIENT
Start: 2021-05-20 | End: 2021-05-24

## 2021-05-20 RX ORDER — OXYCODONE HYDROCHLORIDE 5 MG/1
5 TABLET ORAL ONCE
Refills: 0 | Status: DISCONTINUED | OUTPATIENT
Start: 2021-05-20 | End: 2021-05-20

## 2021-05-20 RX ORDER — SENNA PLUS 8.6 MG/1
2 TABLET ORAL AT BEDTIME
Refills: 0 | Status: DISCONTINUED | OUTPATIENT
Start: 2021-05-20 | End: 2021-05-24

## 2021-05-20 RX ORDER — AMIODARONE HYDROCHLORIDE 400 MG/1
150 TABLET ORAL ONCE
Refills: 0 | Status: COMPLETED | OUTPATIENT
Start: 2021-05-20 | End: 2021-05-20

## 2021-05-20 RX ORDER — CELECOXIB 200 MG/1
200 CAPSULE ORAL EVERY 12 HOURS
Refills: 0 | Status: DISCONTINUED | OUTPATIENT
Start: 2021-05-21 | End: 2021-05-24

## 2021-05-20 RX ORDER — POLYETHYLENE GLYCOL 3350 17 G/17G
17 POWDER, FOR SOLUTION ORAL AT BEDTIME
Refills: 0 | Status: DISCONTINUED | OUTPATIENT
Start: 2021-05-20 | End: 2021-05-24

## 2021-05-20 RX ORDER — ASCORBIC ACID 60 MG
500 TABLET,CHEWABLE ORAL
Refills: 0 | Status: DISCONTINUED | OUTPATIENT
Start: 2021-05-20 | End: 2021-05-24

## 2021-05-20 RX ORDER — SODIUM CHLORIDE 9 MG/ML
3 INJECTION INTRAMUSCULAR; INTRAVENOUS; SUBCUTANEOUS EVERY 8 HOURS
Refills: 0 | Status: DISCONTINUED | OUTPATIENT
Start: 2021-05-20 | End: 2021-05-20

## 2021-05-20 RX ORDER — SODIUM CHLORIDE 9 MG/ML
1000 INJECTION, SOLUTION INTRAVENOUS
Refills: 0 | Status: DISCONTINUED | OUTPATIENT
Start: 2021-05-20 | End: 2021-05-20

## 2021-05-20 RX ORDER — FOLIC ACID 0.8 MG
1 TABLET ORAL DAILY
Refills: 0 | Status: DISCONTINUED | OUTPATIENT
Start: 2021-05-20 | End: 2021-05-24

## 2021-05-20 RX ORDER — HYDROMORPHONE HYDROCHLORIDE 2 MG/ML
0.5 INJECTION INTRAMUSCULAR; INTRAVENOUS; SUBCUTANEOUS EVERY 4 HOURS
Refills: 0 | Status: DISCONTINUED | OUTPATIENT
Start: 2021-05-20 | End: 2021-05-24

## 2021-05-20 RX ORDER — ACETAMINOPHEN 500 MG
975 TABLET ORAL ONCE
Refills: 0 | Status: COMPLETED | OUTPATIENT
Start: 2021-05-20 | End: 2021-05-20

## 2021-05-20 RX ORDER — APIXABAN 2.5 MG/1
5 TABLET, FILM COATED ORAL EVERY 12 HOURS
Refills: 0 | Status: DISCONTINUED | OUTPATIENT
Start: 2021-05-20 | End: 2021-05-24

## 2021-05-20 RX ORDER — FENTANYL CITRATE 50 UG/ML
50 INJECTION INTRAVENOUS
Refills: 0 | Status: DISCONTINUED | OUTPATIENT
Start: 2021-05-20 | End: 2021-05-20

## 2021-05-20 RX ORDER — CEFAZOLIN SODIUM 1 G
2000 VIAL (EA) INJECTION EVERY 8 HOURS
Refills: 0 | Status: COMPLETED | OUTPATIENT
Start: 2021-05-20 | End: 2021-05-21

## 2021-05-20 RX ORDER — FERROUS SULFATE 325(65) MG
325 TABLET ORAL DAILY
Refills: 0 | Status: DISCONTINUED | OUTPATIENT
Start: 2021-05-20 | End: 2021-05-24

## 2021-05-20 RX ORDER — ACETAMINOPHEN 500 MG
975 TABLET ORAL EVERY 8 HOURS
Refills: 0 | Status: DISCONTINUED | OUTPATIENT
Start: 2021-05-20 | End: 2021-05-24

## 2021-05-20 RX ORDER — FAMOTIDINE 10 MG/ML
20 INJECTION INTRAVENOUS ONCE
Refills: 0 | Status: COMPLETED | OUTPATIENT
Start: 2021-05-20 | End: 2021-05-20

## 2021-05-20 RX ORDER — PANTOPRAZOLE SODIUM 20 MG/1
40 TABLET, DELAYED RELEASE ORAL
Refills: 0 | Status: DISCONTINUED | OUTPATIENT
Start: 2021-05-20 | End: 2021-05-24

## 2021-05-20 RX ORDER — SODIUM CHLORIDE 9 MG/ML
1000 INJECTION, SOLUTION INTRAVENOUS
Refills: 0 | Status: DISCONTINUED | OUTPATIENT
Start: 2021-05-20 | End: 2021-05-21

## 2021-05-20 RX ORDER — METOPROLOL TARTRATE 50 MG
5 TABLET ORAL ONCE
Refills: 0 | Status: COMPLETED | OUTPATIENT
Start: 2021-05-20 | End: 2021-05-20

## 2021-05-20 RX ORDER — PANTOPRAZOLE SODIUM 20 MG/1
40 TABLET, DELAYED RELEASE ORAL ONCE
Refills: 0 | Status: COMPLETED | OUTPATIENT
Start: 2021-05-20 | End: 2021-05-20

## 2021-05-20 RX ORDER — OXYCODONE HYDROCHLORIDE 5 MG/1
5 TABLET ORAL EVERY 4 HOURS
Refills: 0 | Status: DISCONTINUED | OUTPATIENT
Start: 2021-05-20 | End: 2021-05-24

## 2021-05-20 RX ADMIN — SODIUM CHLORIDE 100 MILLILITER(S): 9 INJECTION, SOLUTION INTRAVENOUS at 16:23

## 2021-05-20 RX ADMIN — APIXABAN 5 MILLIGRAM(S): 2.5 TABLET, FILM COATED ORAL at 21:34

## 2021-05-20 RX ADMIN — FENTANYL CITRATE 50 MICROGRAM(S): 50 INJECTION INTRAVENOUS at 15:00

## 2021-05-20 RX ADMIN — Medication 5 MILLIGRAM(S): at 19:48

## 2021-05-20 RX ADMIN — POLYETHYLENE GLYCOL 3350 17 GRAM(S): 17 POWDER, FOR SOLUTION ORAL at 21:35

## 2021-05-20 RX ADMIN — HYDROMORPHONE HYDROCHLORIDE 0.5 MILLIGRAM(S): 2 INJECTION INTRAMUSCULAR; INTRAVENOUS; SUBCUTANEOUS at 16:00

## 2021-05-20 RX ADMIN — Medication 100 MILLIGRAM(S): at 21:34

## 2021-05-20 RX ADMIN — FAMOTIDINE 20 MILLIGRAM(S): 10 INJECTION INTRAVENOUS at 11:42

## 2021-05-20 RX ADMIN — HYDROMORPHONE HYDROCHLORIDE 0.5 MILLIGRAM(S): 2 INJECTION INTRAMUSCULAR; INTRAVENOUS; SUBCUTANEOUS at 16:15

## 2021-05-20 RX ADMIN — OXYCODONE HYDROCHLORIDE 10 MILLIGRAM(S): 5 TABLET ORAL at 18:45

## 2021-05-20 RX ADMIN — SODIUM CHLORIDE 75 MILLILITER(S): 9 INJECTION, SOLUTION INTRAVENOUS at 21:35

## 2021-05-20 RX ADMIN — FENTANYL CITRATE 50 MICROGRAM(S): 50 INJECTION INTRAVENOUS at 15:15

## 2021-05-20 RX ADMIN — Medication 975 MILLIGRAM(S): at 21:34

## 2021-05-20 RX ADMIN — OXYCODONE HYDROCHLORIDE 5 MILLIGRAM(S): 5 TABLET ORAL at 15:29

## 2021-05-20 RX ADMIN — OXYCODONE HYDROCHLORIDE 5 MILLIGRAM(S): 5 TABLET ORAL at 15:00

## 2021-05-20 RX ADMIN — FENTANYL CITRATE 50 MICROGRAM(S): 50 INJECTION INTRAVENOUS at 14:45

## 2021-05-20 RX ADMIN — PANTOPRAZOLE SODIUM 40 MILLIGRAM(S): 20 TABLET, DELAYED RELEASE ORAL at 11:42

## 2021-05-20 RX ADMIN — Medication 975 MILLIGRAM(S): at 11:41

## 2021-05-20 RX ADMIN — SENNA PLUS 2 TABLET(S): 8.6 TABLET ORAL at 21:35

## 2021-05-20 RX ADMIN — AMIODARONE HYDROCHLORIDE 618 MILLIGRAM(S): 400 TABLET ORAL at 15:15

## 2021-05-20 RX ADMIN — Medication 1 TABLET(S): at 21:35

## 2021-05-20 RX ADMIN — Medication 500 MILLIGRAM(S): at 21:35

## 2021-05-20 RX ADMIN — HYDROMORPHONE HYDROCHLORIDE 0.5 MILLIGRAM(S): 2 INJECTION INTRAMUSCULAR; INTRAVENOUS; SUBCUTANEOUS at 15:45

## 2021-05-20 RX ADMIN — Medication 975 MILLIGRAM(S): at 11:42

## 2021-05-20 NOTE — DISCHARGE NOTE PROVIDER - NSDCMRMEDTOKEN_GEN_ALL_CORE_FT
amiodarone 200 mg oral tablet: 2 tab(s) orally 3 times a day  betamethasone dipropionate 0.05% topical cream: Apply topically to affected area 2 times a day  Eliquis 5 mg oral tablet: 1 tab(s) orally 2 times a day  folic acid 1 mg oral tablet: 1 tab(s) orally once a day  levETIRAcetam 250 mg oral tablet: 2 tab(s) orally 2 times a day  methotrexate 2.5 mg oral tablet: 10 pills every week Mondays  metoprolol tartrate 75 mg oral tablet: 1 tab(s) orally 2 times a day  oxyCODONE 5 mg oral tablet: 1 tab(s) orally every 4 hours, As Needed MDD:6 tabs  simvastatin 40 mg oral tablet: 1 tab(s) orally once a day (at bedtime)   amiodarone 200 mg oral tablet: 2 tab(s) orally 3 times a day  betamethasone dipropionate 0.05% topical cream: Apply topically to affected area 2 times a day  Eliquis 5 mg oral tablet: 1 tab(s) orally 2 times a day  folic acid 1 mg oral tablet: 1 tab(s) orally once a day  levETIRAcetam 250 mg oral tablet: 2 tab(s) orally 2 times a day  methotrexate 2.5 mg oral tablet: 10 pills every week Mondays  metoprolol tartrate 75 mg oral tablet: 1 tab(s) orally 2 times a day  MiraLax oral powder for reconstitution: 17 gram(s) orally once a day  as needed for constipation  oxyCODONE 5 mg oral tablet: 1 tab(s) orally every 4 hours, As Needed MDD:6 tabs  pantoprazole 40 mg oral delayed release tablet: 1 tab(s) orally once a day   senna oral tablet: 2 tab(s) orally once a day (at bedtime)   simvastatin 40 mg oral tablet: 1 tab(s) orally once a day (at bedtime)  Tylenol Extra Strength 500 mg oral tablet: 2 tab(s) orally 3 times a day

## 2021-05-20 NOTE — CONSULT NOTE ADULT - ASSESSMENT
PAT  Chronic Atrial fibrillation  Moderate AS    Suggest:    Amiodarone 150 mg IV for rate control  Resume beta blockers  resume anticoagulation  Admit to cardiac monitored bed  Will follow up with you  D/W team

## 2021-05-20 NOTE — CONSULT NOTE ADULT - ASSESSMENT
This is a 76 year old male s/p left total knee replacment with atrial fibrillation, [id number] #4 on Eliquis at home with high risk for VTE due to history CVA, surgery, impaired mobility, and BMI. He is low risk for bleeding.    Plan:  ::Resume Eliquis 5mg PO twice daily starting tonight 5.20  ::Daily CBC/BMP  ::Venodynes b/l  ::Enc ambulation per PT eval    Thank you for this consult, will continue to follow.  Dispo: ?

## 2021-05-20 NOTE — DISCHARGE NOTE PROVIDER - NSDCFUADDINST_GEN_ALL_CORE_FT
Discharge Instructions for Left Total Knee Arthroplasty:    1. ACTIVITY: WBAT, Rolling walker, Daily PT. Gentle ROM 0-full as tolerated. Walk plenty.  Knee Immobilizer at night time only for 3 weeks.  2. CALL FOR: fever over 101, wound redness, drainage or open area, calf pain/calf swelling.  3. BANDAGE: Remove LAZ NO SOONER than POD7 (5/27/21) and place a Mepilex Ag bandage over incision. May change sooner ONLY if LAZ leaks or falls off. DO NOT REMOVE BANDAGE TO CHECK WOUND ON INTAKE.  4. SHOWER: Can shower after POD7.  5. STAPLES: RN Remove Staples POD14 (6/3/21).  6. DVT PE PROPHYLAXIS: Managed by Anticoag Team. See Med Rec.  7. GI: Continue Protonix daily while on Anticoagulant. an eRx has been sent to your pharmacy.  8. LABS:  INR if on Coumadin.  9. FOLLOW UP: Dr. Etienne in 1 month. Call to schedule.  10. MEDICATIONS:  If going home, eRX sent to your pharmacy for .   11.**Call office if medications not covered under your insurance, especially BLOOD CLOT PREVENTION/anticoagulant medication.      Discharge Instructions for Left Total Knee Arthroplasty:    1. ACTIVITY: WBAT, Rolling walker, Daily PT. Gentle ROM 0-full as tolerated. Walk plenty.  Knee Immobilizer at night time only for 3 weeks.  2. CALL FOR: fever over 101, wound redness, drainage or open area, calf pain/calf swelling.  3. BANDAGE: Remove LAZ NO SOONER than POD7 (5/27/21) and place a Mepilex Ag bandage over incision. May change sooner ONLY if LAZ leaks or falls off. DO NOT REMOVE BANDAGE TO CHECK WOUND ON INTAKE.  4. SHOWER: Can shower after POD7.  5. STAPLES: RN Remove Staples POD14 (6/3/21).  6. DVT PE PROPHYLAXIS: Managed by Anticoag Team. See Med Rec.  7. GI: Continue Protonix daily while on Anticoagulant. an eRx has been sent to your pharmacy.  8. LABS:  INR if on Coumadin.  9. FOLLOW UP: Dr. Etienne in 1 month. Call to schedule.  10. MEDICATIONS:  If going home, eRX sent to your pharmacy for .   11. Follow up with PMD and with cardiology to discuss events of this visit.

## 2021-05-20 NOTE — PHYSICAL THERAPY INITIAL EVALUATION ADULT - MANUAL MUSCLE TESTING RESULTS, REHAB EVAL
Strength is grossly at least 3+/5 throughout BUE. BLE limited 2/2 spinal nerve block/grossly assessed due to

## 2021-05-20 NOTE — PATIENT PROFILE ADULT - MONEY FOR FOOD
Spoke to patient he stated he never requested script for Topamax, and stopped taking it due to the way it made him feel.     no

## 2021-05-20 NOTE — PROGRESS NOTE ADULT - SUBJECTIVE AND OBJECTIVE BOX
pt seen at bedside in PACU has history of paroxysmal afib and has been in Afib since before his TKA surgery today.  minimal pain to left knee, denies N/T LLE no other complaints    PE Left knee  dressing c/d/i   compartmenst soft non tender  FROM ankle and toes  + EHL FHL GS TA  SILT   DP intact

## 2021-05-20 NOTE — PHYSICAL THERAPY INITIAL EVALUATION ADULT - ACTIVE RANGE OF MOTION EXAMINATION, REHAB EVAL
BLE limited 2/2 spinal nerve block/bilateral upper extremity Active ROM was WFL (within functional limits)

## 2021-05-20 NOTE — PHYSICAL THERAPY INITIAL EVALUATION ADULT - ADDITIONAL COMMENTS
Pt reports he lives in private home with 6-10 steps to enter, then 6-8 steps inside. Pt reports he lives with wife, is indep with all ADLs, and indep amb with SC.

## 2021-05-20 NOTE — DISCHARGE NOTE PROVIDER - CARE PROVIDER_API CALL
Danny Etienne)  Orthopaedic Surgery  [address] [place] [address] [place] [address]  Phone: [phone]  Fax: [phone]  Follow Up Time:    Danny Etienne)  Orthopaedic Surgery  [address] [facility], [place] [address]  Phone: [phone]  Fax: [phone]  Follow Up Time:     Gavino Petit)  Cardiology; Interventional Cardiology  [address] [facility], [place] [address]  Phone: [phone]  Fax: [phone]  Follow Up Time:

## 2021-05-20 NOTE — CHART NOTE - NSCHARTNOTEFT_GEN_A_CORE
77yo/M with PMH PAF, psoriatic arthritis, seizure disorder, OA s/p prior RT TKR presented for elective LT TKR. Patient has had pain in his LT knee affecting his ambulation, he failed outpatient treatment and scheduled for elective surgery. today  S/P total knee replacement today. In PACU had afib with RVR.   Treated with Adenosine and a bolus of Amiodarone.  Currently rate in 110-130 range, Asymptomatic .  Patient has hx of PAF on Metoprolol XL 50mg  daily.  Vital Signs Last 24 Hrs  T(C): 36.5 (20 May 2021 17:39), Max: 36.7 (20 May 2021 11:27)  T(F): 97.7 (20 May 2021 17:39), Max: 98.1 (20 May 2021 11:27)  HR: 101 (20 May 2021 20:00) (50 - 137)  BP: 113/62 (20 May 2021 20:00) (102/77 - 143/91)  BP(mean): 72 (20 May 2021 20:00) (72 - 99)  RR: 11 (20 May 2021 20:00) (11 - 20)  SpO2: 98% (20 May 2021 20:00) (94% - 100%)  05-20  141  |  108  |  16  ----------------------------<  130<H>  4.9   |  26  |  0.96  Ca    9.1      20 May 2021 14:51                     12.9   11.26 )-----------( 138      ( 20 May 2021 14:51 )             40.1   Plan Lopressor 5 mg IVP for immediate rate control ( target <100)  Restart Metoprolol XL 50mg daily

## 2021-05-20 NOTE — DISCHARGE NOTE PROVIDER - HOSPITAL COURSE
H&P:  Pt is a 76y Male   PAST MEDICAL & SURGICAL HISTORY:  Atrial fibrillation  paroxysmal    Cardiac arrhythmia  PAT    Cerebrovascular accident  2011 deficit: balance dizziness and weakness both hands    Seizure disorder  last seizure 2019    Psoriatic arthritis    Rib fracture  left side  Last week of April 2021    History of lumbar spinal fusion    H/O total knee replacement, right  1990&#x27;s    History of tonsillectomy    History of cataract surgery  2017         Now s/p Left Total Knee Arthroplasty. Pt is afebrile with stable vital signs. Pain is controlled. Alert and Oriented. Exam reveals intact EHL FHL TA GS, +DP. Dressing is clean and dry.    Hospital Course:  Patient presented to [facility] medically cleared for elective Left Total Knee Replacement Surgery, having failed outpatient conservative management. Prophylactic antibiotics were started before the procedure and continued for 24 hours. They were admitted after surgery to the orthopedic floor. There were no complications during the hospital stay. All home medications were continued.     **Pt received **U PRBC post op for Acute Blood Loss Anemia.    Routine consults were obtained from the Anticoagulation Team for DVT/PE prophylaxis, from Physical Therapy for twice daily PT, and from the Hospitalist for Medical Co-management. Patient was placed on anticoagulation. Pertinent home medications were continued. Daily labs were followed.      On POD 0 pt was stable overnight. No major events post-op. Pt received twice daily PT. The plan is for DC to home with home PT** or to Rehab for ongoing PT**. The orthopedic Attending is aware and agrees.    ******INCOMPLETE NOTE IN PREPARATION FOR DISPO PLANNING*******  ******INCOMPLETE NOTE IN PREPARATION FOR DISPO PLANNING*******  ******INCOMPLETE NOTE IN PREPARATION FOR DISPO PLANNING*******   H&P:  Pt is a 76y Male   PAST MEDICAL & SURGICAL HISTORY:  Atrial fibrillation  paroxysmal    Cardiac arrhythmia  PAT    Cerebrovascular accident  2011 deficit: balance dizziness and weakness both hands    Seizure disorder  last seizure 2019    Psoriatic arthritis    Rib fracture  left side  Last week of April 2021    History of lumbar spinal fusion    H/O total knee replacement, right  1990&#x27;s    History of tonsillectomy    History of cataract surgery  2017         Now s/p Left Total Knee Arthroplasty. Pt is afebrile with stable vital signs. Pain is controlled. Alert and Oriented. Exam reveals intact EHL FHL TA GS, +DP. Dressing is clean and dry.    Hospital Course:  Patient presented to [facility] medically cleared for elective Left Total Knee Replacement Surgery, having failed outpatient conservative management. Prophylactic antibiotics were started before the procedure and continued for 24 hours. They were admitted after surgery to the orthopedic floor. There were no complications during the hospital stay. All home medications were continued. .    Routine consults were obtained from the Anticoagulation Team for DVT/PE prophylaxis, from Physical Therapy for twice daily PT, and from the Hospitalist for Medical Co-management. Patient was placed on home Eliquis for anticoagulation. Pertinent home medications were continued. Daily labs were followed.      The post operative course was complicated by rapid A Fib (in PACU) which was initially thought to be SVT which was treated with Adenosine and Amiodarone. The patient was subsequently transferred to SICU for close monitoring. He was down graded on POD 2 and Cardiology/medicine consults were obtained. After the patient was stabilized medically, he was discharged to home. Pt received twice daily PT. The plan is for DC to home with home PT. The orthopedic Attending is aware and agrees.     H&P:  Pt is a 76y Male   PAST MEDICAL & SURGICAL HISTORY:  Atrial fibrillation  paroxysmal    Cardiac arrhythmia  PAT    Cerebrovascular accident  2011 deficit: balance dizziness and weakness both hands    Seizure disorder  last seizure 2019    Psoriatic arthritis    Rib fracture  left side  Last week of April 2021    History of lumbar spinal fusion    H/O total knee replacement, right  1990&#x27;s    History of tonsillectomy    History of cataract surgery  2017         Now s/p Left Total Knee Arthroplasty. Pt is afebrile with stable vital signs. Pain is controlled. Alert and Oriented. Exam reveals intact EHL FHL TA GS, +DP. Dressing is clean and dry.    Hospital Course:  Patient presented to [facility] medically cleared for elective Left Total Knee Replacement Surgery, having failed outpatient conservative management. Prophylactic antibiotics were started before the procedure and continued for 24 hours. They were admitted after surgery to the orthopedic floor. There were no complications during the hospital stay. All home medications were continued. .    Routine consults were obtained from the Anticoagulation Team for DVT/PE prophylaxis, from Physical Therapy for twice daily PT, and from the Hospitalist for Medical Co-management. Patient was placed on home Eliquis for anticoagulation. Pertinent home medications were continued. Daily labs were followed.      The post operative course was complicated by rapid A Fib (in PACU) which was initially thought to be SVT which was treated with Adenosine and Amiodarone. The patient was subsequently transferred to SICU for close monitoring. He was down graded on POD 2 and Cardiology/medicine consults were obtained. After the patient was stabilized medically, he was discharged to home. Pt received twice daily PT. The plan is for DC to subacute rehab due to patient request for social reasons. The orthopedic Attending is aware and agrees.     H&P:  Pt is a 76y Male   PAST MEDICAL & SURGICAL HISTORY:  Atrial fibrillation  paroxysmal    Cardiac arrhythmia  PAT    Cerebrovascular accident  2011 deficit: balance dizziness and weakness both hands    Seizure disorder  last seizure 2019    Psoriatic arthritis    Rib fracture  left side  Last week of April 2021    History of lumbar spinal fusion    H/O total knee replacement, right  1990&#x27;s    History of tonsillectomy    History of cataract surgery  2017         Now s/p Left Total Knee Arthroplasty. Pt is afebrile with stable vital signs. Pain is controlled. Alert and Oriented. Exam reveals intact EHL FHL TA GS, +DP. Dressing is clean and dry.    Hospital Course:  Patient presented to [facility] medically cleared for elective Left Total Knee Replacement Surgery, having failed outpatient conservative management. Prophylactic antibiotics were started before the procedure and continued for 24 hours. They were admitted after surgery to the orthopedic floor. There were no complications during the hospital stay. All home medications were continued. .    Routine consults were obtained from the Anticoagulation Team for DVT/PE prophylaxis, from Physical Therapy for twice daily PT, and from the Hospitalist for Medical Co-management. Patient was placed on home Eliquis for anticoagulation. Pertinent home medications were continued. Daily labs were followed.      The post operative course was complicated by rapid A Fib (in PACU) which was initially thought to be SVT which was treated with Adenosine and Amiodarone. The patient was subsequently transferred to SICU for close monitoring. He was down graded on POD 2 and Cardiology/medicine consults were obtained. After the patient was stabilized medically, he was discharged to home. Pt received twice daily PT. The plan is for DC to home with home PT. The orthopedic Attending is aware and agrees.

## 2021-05-20 NOTE — PHYSICAL THERAPY INITIAL EVALUATION ADULT - PERTINENT HX OF CURRENT PROBLEM, REHAB EVAL
77yo/M with PMH PAF, psoriatic arthritis, seizure disorder, OA s/p prior RT TKR presented for elective LT TKR POD#0. postop in PACU went into rapid afib, given Adenosine with no effect, then received Amiodarone 150mg bolus. Currently in afib rate 90- 110's

## 2021-05-20 NOTE — PHYSICAL THERAPY INITIAL EVALUATION ADULT - PLANNED THERAPY INTERVENTIONS, PT EVAL
GOAL: Pt will perform 12 stairs with or without U HR as needed within 4weeks./balance training/bed mobility training/gait training/strengthening/transfer training

## 2021-05-20 NOTE — DISCHARGE NOTE PROVIDER - PROVIDER TOKENS
PROVIDER:[TOKEN:[[id number]]] PROVIDER:[TOKEN:[[id number]]],PROVIDER:[TOKEN:[[id number]]]

## 2021-05-20 NOTE — CONSULT NOTE ADULT - SUBJECTIVE AND OBJECTIVE BOX
PCP- DR Bruno Palacio, Cardio- DR Petit    CC- s/p LT TKR    HPI:  75yo/M with PMH PAF, psoriatic arthritis, seizure disorder, OA s/p prior RT TKR presented for elective LT TKR. PAtient has had pain in his LT knee affecting his ambulation, he failed outpatient treatment and scheduled for elective surgery. Medical consult called for postop medical management    PMH- as above  PSH- cataracts, RT TKR in 90's, lumbar spine fusion, tonsillectomy  Soc [id number] quit, alcohol socially  Fam hx- f had stroke, OA    5/20/21- postop in PACU went into rapid afib, given Adenosine with no effect, then received Amiodarone 150mg bolus. Currently in afib rate 90- 110's    Review of system- All 10 systems reviewed and is as per HPI otherwise negative.     T(C): 36.2 (05-20-21 @ 16:30), Max: 36.7 (05-20-21 @ 11:27)  HR: 105 (05-20-21 @ 16:45) (50 - 137)  BP: 113/65 (05-20-21 @ 16:45) (102/77 - 143/91)  RR: 17 (05-20-21 @ 16:45) (12 - 20)  SpO2: 99% (05-20-21 @ 16:45) (96% - 100%)  Wt(kg): --    LABS:                        12.9   11.26 )-----------( 138      ( 20 May 2021 14:51 )             40.1     05-20    141  |  108  |  16  ----------------------------<  130<H>  4.9   |  26  |  0.96    Ca    9.1      20 May 2021 14:51    CAPILLARY BLOOD GLUCOSE  POCT Blood Glucose.: 117 mg/dL (20 May 2021 14:31)  POCT Blood Glucose.: 120 mg/dL (20 May 2021 11:24)      RADIOLOGY & ADDITIONAL TESTS:      PHYSICAL EXAM:  GENERAL: NAD, well-groomed, well-developed  HEAD:  Atraumatic, Normocephalic  EYES: EOMI, PERRLA, conjunctiva and sclera clear  HEENT: Moist mucous membranes  NECK: Supple, No JVD  NERVOUS SYSTEM:  Alert & Oriented X3, Motor Strength 5/5 B/L upper and lower extremities; DTRs 2+ intact and symmetric  CHEST/LUNG: Clear to auscultation bilaterally; No rales, rhonchi, wheezing, or rubs  HEART: irregular; No murmurs, rubs, or gallops  ABDOMEN: Soft, Nontender, Nondistended; Bowel sounds present  GENITOURINARY- Voiding, no palpable bladder  EXTREMITIES:  2+ Peripheral Pulses, No clubbing, cyanosis, or edema  MUSCULOSKELTAL- No muscle tenderness, Muscle tone normal, No joint tenderness, no Joint swelling, Joint range of motion-normal  SKIN-no rash, no lesion  CNS- alert, oriented X3, non focal     Daily Height in cm: 175.26 (20 May 2021 11:27)      apixaban 5 milliGRAM(s) Oral every 12 hours  fentaNYL    Injectable 50 MICROGram(s) IV Push every 10 minutes PRN  HYDROmorphone  Injectable 0.5 milliGRAM(s) IV Push every 10 minutes PRN  lactated ringers. 1000 milliLiter(s) IV Continuous <Continuous>  ondansetron Injectable 4 milliGRAM(s) IV Push once PRN    Assessment/Plan  #S/p LT TKR  Ortho f/u appreciated  PT as tolerated  Pain meds prn  Monitor HH  AC by Eliquis  Bowel regimen  Incentive spirometry    #PAF  Cardio eval DR Petit appreciated  S/p Amiodarone bolus  Rate is better controlled  Will monitor on tele postop  AC by Eliquis  Cont BB    #Seizure disorder  Cont Keppra    #DVT prop- Eliquis    #Dispo- thank you for consult, will follow with you. D/w pt, DR Petit and ortho team    
HPI:  Patient is a 76y old  Male who presents with a chief complaint of left knee pain s/p left total knee replacement 21.      Consulted by Dr. Etienne   for VTE prophylaxis, risk stratification, and anticoagulation management.    PAST MEDICAL & SURGICAL HISTORY:  Atrial fibrillation  paroxysmal    Cardiac arrhythmia  PAT    Cerebrovascular accident   deficit: balance dizziness and weakness both hands    Seizure disorder  last seizure     Psoriatic arthritis    Rib fracture  left side  Last week of 2021    History of lumbar spinal fusion    H/O total knee replacement, right  &#x27;s    History of tonsillectomy    History of cataract surgery          Interval History:  : Patient seen at bedside in PACU. urretnly in rapid AF versus SVT awaiting adenosine IV- management per Dr. Petit. In no apparent distress. Explained will resume Eliquis tonight. Patient denies any questions at this time.    BMI: 28.2    CrCl: pending labs    Incision Time: 1306  Procedure End Time:1443  EBL: 150    Caprini VTE Risk Score:  CAPRINI SCORE  AGE RELATED RISK FACTORS                                                       MOBILITY RELATED FACTORS  [ ] Age 41-60 years                                            (1 Point)                  [ ] Bed rest /restricted mobility                      (1 Point)  [ ] Age: 61-74 years                                           (2 Points)                [ ] Plaster cast                                                   (2 Points)  [ ]x Age= 75 years                                              (3 Points)                 [ ] Bed bound for more than 72 hours                   (2 Points)    DISEASE RELATED RISK FACTORS                                               GENDER SPECIFIC FACTORS  [ ] Edema in the lower extremities                       (1 Point)           [ ] Pregnancy                                                            (1 Point)  [ ] Varicose veins                                               (1 Point)                  [ ] Post-partum < 6 weeks                                      (1 Point)             [x ] BMI > 25 Kg/m2                                            (1 Point)                  [ ] Hormonal therapy or oral contraception       (1 Point)                 [ ] Sepsis (in the previous month)                        (1 Point)             [ ] History of pregnancy complications                (1Point)  [ ] Pneumonia or serious lung disease                                             [ ] Unexplained or recurrent  (=/>3), premature                                 (In the previous month)                               (1 Point)                birth with toxemia or growth-restricted infant (1 Point)  [ ] Abnormal pulmonary function test            (1 Point)                                   SURGERY RELATED RISK FACTORS  [ ] Acute myocardial infarction                       (1 Point)                  [ ]  Section                                         (1 Point)  [ ] Congestive heart failure (in the previous month) (1 Point)   [ ] Minor surgery   lasting <45 minutes       (1 Point)   [ ] Inflammatory bowel disease                             (1 Point)          [ ] Arthroscopic surgery                                  (2 Points)  [ ] Central venous access                                    (2 Points)            [ ] General surgery lasting >45 minutes      (2 Points)       [ ] Stroke (in the previous month)                  (5 Points)            [x ] Elective major lower extremity arthroplasty (5 Points)                                   [  ] Malignancy (present or past include skin melanoma                                          but exclude  basal skin cell)    (2 points)                                      TRAUMA RELATED RISK FACTORS                HEMATOLOGY RELATED FACTORS                                  [ ] Fracture of the hip, pelvis, or leg                       (5 Points)  [ ] Prior episodes of VTE                                     (3 Points)          [ ] Acute spinal cord injury (in the previous month)  (5 Points)  [ ] Positive family history for VTE                         (3 Points)       [ ] Paralysis (less than 1 month)                          (5 Points)  [ ] Prothrombin 74843 A                                      (3 Points)         [ ] Multiple Trauma (within 1month)                 (5Points)                                                                                                                                                                [ ] Factor V Leiden                                          (3 Points)                                OTHER RISK FACTORS                          [ ] Lupus anticoagulants                                     (3 Points)                     [ ] BMI > 40                          (1 Point)                                                         [ ] Anticardiolipin antibodies                                (3 Points)                 [ ] Smoking                              (1Point)                                                [ ] High homocysteine in the blood                      (3 Points)                [  ] Diabetes requiring insulin (1point)                         [ ] Other congenital or acquired thrombophilia       (3 Points)          [  ] Chemotherapy                   (1 Point)  [ ] Heparin induced thrombocytopenia                  (3 Points)             [  ] Blood Transfusion                (1 point)                                                                                                             Total Score [    9      ]                                                                                                                                                                                                                                                                                                                                                                                                                                               [id number] Score: 4    IMPROVE Bleeding Risk Score:3.5      Falls Risk:   High ( x )  Mod (  )  Low (  )      FAMILY HISTORY:  FH: stroke (Father)    FH: arthritis (Father)      Denies any personal or familial history of clotting or bleeding disorders.    Allergies    No Known Allergies    Intolerances        REVIEW OF SYSTEMS    (  )Fever	        (  )Constipation	(  )SOB				  (  )Headache   (  )Dysuria  (  )Chills	        (  )Melena	        (  )Dyspnea on exertion (  )Dizziness    (  )Polyuria  (  )Nausea      (  )Hematochezia	(  )Cough                          (  )Syncope      (  )Hematuria  (  )Vomiting   (  )Chest Pain	        (  )Wheezing			  (  )Weakness  (  )Diarrhea    (  )Palpitations	(  )Anorexia			  ( x )Myalgia       (   ) Arthralgia    Pertinent positives in HPI and daily subjective. All other systems negative.    Vital Signs Last 24 Hrs  T(C): 36.2 (21 @ 14:25), Max: 36.7 (21 @ 11:27)  T(F): 97.1 (05-20-21 @ 14:25), Max: 98.1 (21 @ 11:27)  HR: 136 (21 @ 15:00) (50 - 137)  BP: 134/92 (21 @ 15:00) (117/86 - 134/92)  BP(mean): --  RR: 14 (21 @ 15:00) (14 - 20)  SpO2: 98% (21 @ 15:00) (97% - 98%)      PHYSICAL EXAM:    Constitutional: Appears Well    Neurological: A& O x 3    Skin: Warm    Respiratory and Thorax: normal effort; Breath sounds: normal; No rales/wheezing/rhonchi  	  Cardiovascular: S1, S2, irregular, NMBR	MP: rate irreg 137bpm, SVT vs DENA    Gastrointestinal: BS + x 4Q, nontender	    Genitourinary:  Bladder nondistended, nontender    Musculoskeletal:   General Right:   no muscle/joint tenderness,   normal tone, no joint swelling,   ROM: full	    General Left:   no muscle/joint tenderness,   normal tone, no joint swelling,   ROM: limited    Knee:  Left: Incision: ; Dressing CDI    Lower extrems:   Right: no calf tenderness              negative misael's sign               + pedal pulses    Left:   no calf tenderness              negative misael's sign               + pedal pulses    		    MEDICATIONS  (STANDING):  aMIOdarone IVPB 150 milliGRAM(s) IV Intermittent once  apixaban 5 milliGRAM(s) Oral every 12 hours  lactated ringers. 1000 milliLiter(s) IV Continuous <Continuous>            **Current DVT Prophylaxis:    LMWH                   (  )  Heparin SQ           (  )  Coumadin             (  )  Xarelto                  (  )  Eliquis                   ( x )  Venodynes           (x  )  Ambulation          ( x )  UFH                       (  )  ECASA                   (  )  Contraindicated  (  )          
Patient is a 76y old  Male who presents with a chief complaint of S/p LT TKR (20 May 2021 16:55)      HPI:  77 yo male is s/p left TKR. Noted to have tachycardia when he was brought in to the OR. Given IV Lopressor 5 mg X 3 doses (total 15 mg) and slowed down to proceed with surgery under spinal anesthesia. Post op noted to have narrow complex tachycardia which seemed to be regular. Patient has h/o PAF and PAT. IV Adenosine given, rhythm slowed down transiently, no P waves noted. Tachycardia resumed. Patient is hemodynamically stable and asymptomatic.       PAST MEDICAL & SURGICAL HISTORY:  Atrial fibrillation  PAT  Moderate AS  Cerebrovascular accident 2011 deficit: balance dizziness and weakness both hands  Seizure disorder last seizure 2019  Psoriatic arthritis  Rib fracture left side  Last week of April 2021  History of lumbar spinal fusion  H/O total knee replacement, right  History of tonsillectomy  History of cataract surgery 2017      PREVIOUS CARDIAC WORKUP:      Echo:  5/11/21  --There is moderate left atrial dilatation (LA volume index 44 ml/m²).  --There is mild left ventricular hypertrophy.  --LV global wall motion is normal.  --LV ejection fraction (62 %) is normal.  --There is mild right atrial dilatation.  --The aortic valve is mildly calcified. There is moderate aortic stenosis. The aortic valve area, by the continuity equation, is 1.21 cm².  --There is mitral annular calcification. There is mild mitral regurgitation.  --There is mild tricuspid regurgitation.  --There is mild pulmonic regurgitation.  --The right atrial pressure is normal (0 - 5 mm Hg). There is no pulmonary hypertension.  --There is no pericardial effusion.    Nuclear stress test:  5/14/21  ·	There is a small size defect, of moderate severity, in the apical wall(s), that is predominantly fixed, consistent with soft tissue attenuation.  ·	Rest gated analysis showed normal left ventricular function with normal LV size.  ·	Post-stress analysis showed normal LV function.  ·	Gated wall motion analysis shows normal wall motion.  ·	Overall impression: Probably normal.  ·	Summary: Left ventricular perfusion is probably normal.  ·	No ECG evidence of ischemia after administration of IV regadenoson.  ·	SPECT results are limited by artifact and show no definite ischemia or infarct.      ALLERGIES:    No Known Allergies       MEDICATIONS  (HOME):    •	apixaban (ELIQUIS) 5 mg Tab tablet	Take 1 tablet by mouth 2 (two) times a day.		  •	metoprolol succinate (TOPROL-XL) 50 mg XL tablet	Take 1 tablet by mouth daily.		  •	simvastatin (ZOCOR) 40 mg tablet	Take 1 tablet by mouth nightly.		  •	oxyCODONE (ROXICODONE) 10 mg immediate release tablet	Take 1 tablet by mouth every 8 hours PRN		  •	folic acid (FOLVITE) 1 mg tablet	TAKE 1 TABLET BY MOUTH EVERY DAY		  •	methotrexate 2.5 mg tablet	Take 10 tablets by mouth once a week.		  •	betamethasone dipropionate (DIPROLENE) 0.05 % cream	Apply to psoriatic plaques bid		  •	levETIRAcetam (KEPPRA) 250 mg tablet	Take 2 tablets by mouth 2 (two) times a day.		           FAMILY HISTORY:  FH: stroke (Father)  FH: arthritis (Father)        SOCIAL HISTORY:  Nonsmoker. No ETOH abuse. No illicit drugs.     ROS:     Detailed ten system ROS was performed and was negative except for history as eluded to above.    no fever  no chills  no nausea  no vomiting  no diarrhea  no constipation  no melena  no hematochezia  no chest pain  no palpitations  no sob at rest  no dyspnea on exertion  no cough  no wheezing  no anorexia  no headache  no dizziness  no syncope  no weakness  no myalgia  no dysuria  no polyuria  no hematuria       Vital Signs Last 24 Hrs  T(C): 36.2 (05-20-21 @ 14:25), Max: 36.7 (05-20-21 @ 11:27)  T(F): 97.1 (05-20-21 @ 14:25), Max: 98.1 (05-20-21 @ 11:27)  HR: 136 (05-20-21 @ 15:00) (50 - 137)  BP: 134/92 (05-20-21 @ 15:00) (117/86 - 134/92)  RR: 14 (05-20-21 @ 15:00) (14 - 20)  SpO2: 98% (05-20-21 @ 15:00) (97% - 98%)      PHYSICAL EXAM:    General:                Comfortable, AAO X 3, in no distress.   HEENT:                  Atraumatic, PERRLA, EOMI, conjunctiva clear.   Neck:                     Supple, no adenopathy, no thyromegaly, no JVD, no bruit.  Chest:                    Clear, B/L symmetric air entry, no tachypnea  Heart:                     S1, S2 tachycardic, + murmur.  Abdomen:              Soft, non-tender, bowel sounds active. No palpable masses.  Extremities:           post op left knee.  Skin:                      Skin color, texture normal. No rashes.  Neurologic:            Grossly nonfocal.       TELEMETRY:   AT / AF with RVR    ECG:   AT / SVT    LABS:                          12.9   11.26 )-----------( 138      ( 20 May 2021 14:51 )             40.1     05-20    141  |  108  |  16  ----------------------------<  130<H>  4.9   |  26  |  0.96    Ca    9.1      20 May 2021 14:51

## 2021-05-20 NOTE — PROGRESS NOTE ADULT - ASSESSMENT
AL 76M s/p left TKA     P:  WBAT LLE   therapy   DVT ppx   post op abx  venodynes  incentive spirometer  pain control   follow labs   medicine and cards to follow for afib

## 2021-05-21 LAB
ANION GAP SERPL CALC-SCNC: 3 MMOL/L — LOW (ref 5–17)
BUN SERPL-MCNC: 23 MG/DL — SIGNIFICANT CHANGE UP (ref 7–23)
CALCIUM SERPL-MCNC: 8.3 MG/DL — LOW (ref 8.5–10.1)
CHLORIDE SERPL-SCNC: 109 MMOL/L — HIGH (ref 96–108)
CO2 SERPL-SCNC: 27 MMOL/L — SIGNIFICANT CHANGE UP (ref 22–31)
COVID-19 SPIKE DOMAIN AB INTERP: POSITIVE
COVID-19 SPIKE DOMAIN ANTIBODY RESULT: >250 U/ML — HIGH
CREAT SERPL-MCNC: 0.92 MG/DL — SIGNIFICANT CHANGE UP (ref 0.5–1.3)
GLUCOSE SERPL-MCNC: 157 MG/DL — HIGH (ref 70–99)
HCT VFR BLD CALC: 36.7 % — LOW (ref 39–50)
HGB BLD-MCNC: 12.2 G/DL — LOW (ref 13–17)
MCHC RBC-ENTMCNC: 32.4 PG — SIGNIFICANT CHANGE UP (ref 27–34)
MCHC RBC-ENTMCNC: 33.2 GM/DL — SIGNIFICANT CHANGE UP (ref 32–36)
MCV RBC AUTO: 97.6 FL — SIGNIFICANT CHANGE UP (ref 80–100)
PLATELET # BLD AUTO: 124 K/UL — LOW (ref 150–400)
POTASSIUM SERPL-MCNC: 4.8 MMOL/L — SIGNIFICANT CHANGE UP (ref 3.5–5.3)
POTASSIUM SERPL-SCNC: 4.8 MMOL/L — SIGNIFICANT CHANGE UP (ref 3.5–5.3)
RBC # BLD: 3.76 M/UL — LOW (ref 4.2–5.8)
RBC # FLD: 12.7 % — SIGNIFICANT CHANGE UP (ref 10.3–14.5)
SARS-COV-2 IGG+IGM SERPL QL IA: >250 U/ML — HIGH
SARS-COV-2 IGG+IGM SERPL QL IA: POSITIVE
SODIUM SERPL-SCNC: 139 MMOL/L — SIGNIFICANT CHANGE UP (ref 135–145)
WBC # BLD: 14.22 K/UL — HIGH (ref 3.8–10.5)
WBC # FLD AUTO: 14.22 K/UL — HIGH (ref 3.8–10.5)

## 2021-05-21 PROCEDURE — 99231 SBSQ HOSP IP/OBS SF/LOW 25: CPT

## 2021-05-21 PROCEDURE — 99233 SBSQ HOSP IP/OBS HIGH 50: CPT

## 2021-05-21 RX ORDER — AMIODARONE HYDROCHLORIDE 400 MG/1
400 TABLET ORAL THREE TIMES A DAY
Refills: 0 | Status: COMPLETED | OUTPATIENT
Start: 2021-05-21 | End: 2021-05-22

## 2021-05-21 RX ORDER — LEVETIRACETAM 250 MG/1
500 TABLET, FILM COATED ORAL
Refills: 0 | Status: DISCONTINUED | OUTPATIENT
Start: 2021-05-21 | End: 2021-05-21

## 2021-05-21 RX ORDER — METOPROLOL TARTRATE 50 MG
5 TABLET ORAL ONCE
Refills: 0 | Status: COMPLETED | OUTPATIENT
Start: 2021-05-21 | End: 2021-05-21

## 2021-05-21 RX ORDER — METOPROLOL TARTRATE 50 MG
50 TABLET ORAL
Refills: 0 | Status: DISCONTINUED | OUTPATIENT
Start: 2021-05-21 | End: 2021-05-23

## 2021-05-21 RX ORDER — LEVETIRACETAM 250 MG/1
500 TABLET, FILM COATED ORAL
Refills: 0 | Status: DISCONTINUED | OUTPATIENT
Start: 2021-05-21 | End: 2021-05-24

## 2021-05-21 RX ADMIN — OXYCODONE HYDROCHLORIDE 10 MILLIGRAM(S): 5 TABLET ORAL at 22:00

## 2021-05-21 RX ADMIN — Medication 50 MILLIGRAM(S): at 06:52

## 2021-05-21 RX ADMIN — Medication 1 TABLET(S): at 21:13

## 2021-05-21 RX ADMIN — CELECOXIB 200 MILLIGRAM(S): 200 CAPSULE ORAL at 22:30

## 2021-05-21 RX ADMIN — CELECOXIB 200 MILLIGRAM(S): 200 CAPSULE ORAL at 11:08

## 2021-05-21 RX ADMIN — LEVETIRACETAM 500 MILLIGRAM(S): 250 TABLET, FILM COATED ORAL at 21:16

## 2021-05-21 RX ADMIN — Medication 325 MILLIGRAM(S): at 10:18

## 2021-05-21 RX ADMIN — POLYETHYLENE GLYCOL 3350 17 GRAM(S): 17 POWDER, FOR SOLUTION ORAL at 21:14

## 2021-05-21 RX ADMIN — Medication 975 MILLIGRAM(S): at 14:23

## 2021-05-21 RX ADMIN — OXYCODONE HYDROCHLORIDE 5 MILLIGRAM(S): 5 TABLET ORAL at 11:09

## 2021-05-21 RX ADMIN — Medication 1 MILLIGRAM(S): at 10:28

## 2021-05-21 RX ADMIN — OXYCODONE HYDROCHLORIDE 5 MILLIGRAM(S): 5 TABLET ORAL at 21:15

## 2021-05-21 RX ADMIN — Medication 100 MILLIGRAM(S): at 05:41

## 2021-05-21 RX ADMIN — LEVETIRACETAM 500 MILLIGRAM(S): 250 TABLET, FILM COATED ORAL at 10:42

## 2021-05-21 RX ADMIN — Medication 975 MILLIGRAM(S): at 21:13

## 2021-05-21 RX ADMIN — LEVETIRACETAM 500 MILLIGRAM(S): 250 TABLET, FILM COATED ORAL at 10:30

## 2021-05-21 RX ADMIN — OXYCODONE HYDROCHLORIDE 5 MILLIGRAM(S): 5 TABLET ORAL at 10:18

## 2021-05-21 RX ADMIN — OXYCODONE HYDROCHLORIDE 5 MILLIGRAM(S): 5 TABLET ORAL at 20:21

## 2021-05-21 RX ADMIN — Medication 500 MILLIGRAM(S): at 21:13

## 2021-05-21 RX ADMIN — AMIODARONE HYDROCHLORIDE 400 MILLIGRAM(S): 400 TABLET ORAL at 23:12

## 2021-05-21 RX ADMIN — APIXABAN 5 MILLIGRAM(S): 2.5 TABLET, FILM COATED ORAL at 21:13

## 2021-05-21 RX ADMIN — Medication 1 TABLET(S): at 13:37

## 2021-05-21 RX ADMIN — Medication 5 MILLIGRAM(S): at 10:41

## 2021-05-21 RX ADMIN — Medication 500 MILLIGRAM(S): at 10:18

## 2021-05-21 RX ADMIN — PANTOPRAZOLE SODIUM 40 MILLIGRAM(S): 20 TABLET, DELAYED RELEASE ORAL at 05:42

## 2021-05-21 RX ADMIN — CELECOXIB 200 MILLIGRAM(S): 200 CAPSULE ORAL at 10:19

## 2021-05-21 RX ADMIN — Medication 975 MILLIGRAM(S): at 05:41

## 2021-05-21 RX ADMIN — Medication 1 TABLET(S): at 05:42

## 2021-05-21 RX ADMIN — OXYCODONE HYDROCHLORIDE 5 MILLIGRAM(S): 5 TABLET ORAL at 21:20

## 2021-05-21 RX ADMIN — APIXABAN 5 MILLIGRAM(S): 2.5 TABLET, FILM COATED ORAL at 10:19

## 2021-05-21 RX ADMIN — Medication 1 TABLET(S): at 10:18

## 2021-05-21 RX ADMIN — CELECOXIB 200 MILLIGRAM(S): 200 CAPSULE ORAL at 21:13

## 2021-05-21 RX ADMIN — Medication 5 MILLIGRAM(S): at 11:12

## 2021-05-21 RX ADMIN — Medication 975 MILLIGRAM(S): at 22:00

## 2021-05-21 RX ADMIN — Medication 975 MILLIGRAM(S): at 00:26

## 2021-05-21 RX ADMIN — SENNA PLUS 2 TABLET(S): 8.6 TABLET ORAL at 21:13

## 2021-05-21 RX ADMIN — Medication 975 MILLIGRAM(S): at 13:37

## 2021-05-21 RX ADMIN — Medication 50 MILLIGRAM(S): at 21:13

## 2021-05-21 NOTE — PROGRESS NOTE ADULT - SUBJECTIVE AND OBJECTIVE BOX
HPI:  Patient is a 76y old  Male who presents with a chief complaint of left knee pain s/p left total knee replacement 21.      Consulted by Dr. Etienne   for VTE prophylaxis, risk stratification, and anticoagulation management.    PAST MEDICAL & SURGICAL HISTORY:  Atrial fibrillation  paroxysmal    Cardiac arrhythmia  PAT    Cerebrovascular accident   deficit: balance dizziness and weakness both hands    Seizure disorder  last seizure     Psoriatic arthritis    Rib fracture  left side  Last week of 2021    History of lumbar spinal fusion    H/O total knee replacement, right  &#x27;s    History of tonsillectomy    History of cataract surgery          Interval History:  : Patient seen at bedside in PACU. urretnly in rapid AF versus SVT awaiting adenosine IV- management per Dr. Petit. In no apparent distress. Explained will resume Eliquis tonight. Patient denies any questions at this time.  21: Patient seen at bedside with PT. He was OOB to chair with rate 140's steady AF. Asymptomatic. About to get up again with PT and did ambulate in hallway with walker. Advised of resumption of Eliquis last night. Patient verbalized understanding.    BMI: 28.2    CrCl: pending labs    Incision Time: 1306  Procedure End Time:1443  EBL: 150    Caprini VTE Risk Score:  CAPRINI SCORE  AGE RELATED RISK FACTORS                                                       MOBILITY RELATED FACTORS  [ ] Age 41-60 years                                            (1 Point)                  [ ] Bed rest /restricted mobility                      (1 Point)  [ ] Age: 61-74 years                                           (2 Points)                [ ] Plaster cast                                                   (2 Points)  [ ]x Age= 75 years                                              (3 Points)                 [ ] Bed bound for more than 72 hours                   (2 Points)    DISEASE RELATED RISK FACTORS                                               GENDER SPECIFIC FACTORS  [ ] Edema in the lower extremities                       (1 Point)           [ ] Pregnancy                                                            (1 Point)  [ ] Varicose veins                                               (1 Point)                  [ ] Post-partum < 6 weeks                                      (1 Point)             [x ] BMI > 25 Kg/m2                                            (1 Point)                  [ ] Hormonal therapy or oral contraception       (1 Point)                 [ ] Sepsis (in the previous month)                        (1 Point)             [ ] History of pregnancy complications                (1Point)  [ ] Pneumonia or serious lung disease                                             [ ] Unexplained or recurrent  (=/>3), premature                                 (In the previous month)                               (1 Point)                birth with toxemia or growth-restricted infant (1 Point)  [ ] Abnormal pulmonary function test            (1 Point)                                   SURGERY RELATED RISK FACTORS  [ ] Acute myocardial infarction                       (1 Point)                  [ ]  Section                                         (1 Point)  [ ] Congestive heart failure (in the previous month) (1 Point)   [ ] Minor surgery   lasting <45 minutes       (1 Point)   [ ] Inflammatory bowel disease                             (1 Point)          [ ] Arthroscopic surgery                                  (2 Points)  [ ] Central venous access                                    (2 Points)            [ ] General surgery lasting >45 minutes      (2 Points)       [ ] Stroke (in the previous month)                  (5 Points)            [x ] Elective major lower extremity arthroplasty (5 Points)                                   [  ] Malignancy (present or past include skin melanoma                                          but exclude  basal skin cell)    (2 points)                                      TRAUMA RELATED RISK FACTORS                HEMATOLOGY RELATED FACTORS                                  [ ] Fracture of the hip, pelvis, or leg                       (5 Points)  [ ] Prior episodes of VTE                                     (3 Points)          [ ] Acute spinal cord injury (in the previous month)  (5 Points)  [ ] Positive family history for VTE                         (3 Points)       [ ] Paralysis (less than 1 month)                          (5 Points)  [ ] Prothrombin 91803 A                                      (3 Points)         [ ] Multiple Trauma (within 1month)                 (5Points)                                                                                                                                                                [ ] Factor V Leiden                                          (3 Points)                                OTHER RISK FACTORS                          [ ] Lupus anticoagulants                                     (3 Points)                     [ ] BMI > 40                          (1 Point)                                                         [ ] Anticardiolipin antibodies                                (3 Points)                 [ ] Smoking                              (1Point)                                                [ ] High homocysteine in the blood                      (3 Points)                [  ] Diabetes requiring insulin (1point)                         [ ] Other congenital or acquired thrombophilia       (3 Points)          [  ] Chemotherapy                   (1 Point)  [ ] Heparin induced thrombocytopenia                  (3 Points)             [  ] Blood Transfusion                (1 point)                                                                                                             Total Score [    9      ]                                                                                                                                                                                                                                                                                                                                                                                                                                               [id number] Score: 4    IMPROVE Bleeding Risk Score:3.5      Falls Risk:   High ( x )  Mod (  )  Low (  )      FAMILY HISTORY:  FH: stroke (Father)    FH: arthritis (Father)      Denies any personal or familial history of clotting or bleeding disorders.    Allergies    No Known Allergies    Intolerances        REVIEW OF SYSTEMS    (  )Fever	        (  )Constipation	(  )SOB				  (  )Headache   (  )Dysuria  (  )Chills	        (  )Melena	        (  )Dyspnea on exertion (  )Dizziness    (  )Polyuria  (  )Nausea      (  )Hematochezia	(  )Cough                          (  )Syncope      (  )Hematuria  (  )Vomiting   (  )Chest Pain	        (  )Wheezing			  (  )Weakness  (  )Diarrhea    (  )Palpitations	(  )Anorexia			  ( x )Myalgia       (   ) Arthralgia    Pertinent positives in HPI and daily subjective. All other systems negative.    Vital Signs Last 24 Hrs  T(C): 36.7 (21 @ 09:11), Max: 36.7 (21 @ 21:19)  T(F): 98 (21 @ 09:11), Max: 98 (21 @ 21:19)  HR: 99 (21 @ 12:00) (91 - 137)  BP: 120/53 (21 @ 12:00) (102/77 - 143/91)  BP(mean): 70 (21 @ 12:00) (65 - 102)  RR: 16 (21 @ 12:00) (11 - 20)  SpO2: 98% (21 @ 12:00) (94% - 100%)      PHYSICAL EXAM:    Constitutional: Appears Well    Neurological: A& O x 3    Skin: Warm    Respiratory and Thorax: normal effort; Breath sounds: normal; No rales/wheezing/rhonchi  	  Cardiovascular: S1, S2, irregular, NMBR	MP: rate  137bpm AF    Gastrointestinal: BS + x 4Q, nontender	    Genitourinary:  Bladder nondistended, nontender    Musculoskeletal:   General Right:   no muscle/joint tenderness,   normal tone, no joint swelling,   ROM: full	    General Left:   no muscle/joint tenderness,   normal tone, no joint swelling,   ROM: limited    Knee:  Left: Incision: ; Dressing CDI    Lower extrems:   Right: no calf tenderness              negative misael's sign               + pedal pulses    Left:   no calf tenderness              negative misael's sign               + pedal pulses    		    MEDICATIONS  (STANDING):  aMIOdarone IVPB 150 milliGRAM(s) IV Intermittent once  apixaban 5 milliGRAM(s) Oral every 12 hours  lactated ringers. 1000 milliLiter(s) IV Continuous <Continuous>            **Current DVT Prophylaxis:    LMWH                   (  )  Heparin SQ           (  )  Coumadin             (  )  Xarelto                  (  )  Eliquis                   ( x )  Venodynes           (x  )  Ambulation          ( x )  UFH                       (  )  ECASA                   (  )  Contraindicated  (  )

## 2021-05-21 NOTE — PROGRESS NOTE ADULT - ASSESSMENT
A/P:   76yMale s/p L Total Knee Arthroplasty POD 1    -Advance diet as tolerated/IVFs while NPO  -Pain control prn  -DVT/PE ppx  - WBAT/PT/OOB as tolerated   -Med Mgmt, continue home meds.  -Cardiology recs appreciated  - FU am Labs  - FU PT eval

## 2021-05-21 NOTE — PROGRESS NOTE ADULT - ASSESSMENT
This is a 76 year old male s/p left total knee replacment with atrial fibrillation, [id number] #4 on Eliquis at home with high risk for VTE due to history CVA, surgery, impaired mobility, and BMI. He is low risk for bleeding.    Plan:  ::Continue Eliquis 5mg PO twice daily  ::Daily CBC/BMP  ::Venodynes b/l  ::Enc ambulation per PT eval    Will continue to follow.  Dispo: home

## 2021-05-21 NOTE — PROGRESS NOTE ADULT - SUBJECTIVE AND OBJECTIVE BOX
77yo/M with PMH PAF, psoriatic arthritis, seizure disorder, OA s/p prior RT TKR presented for elective LT TKR. PAtient has had pain in his LT knee affecting his ambulation, he failed outpatient treatment and scheduled for elective surgery. Pt went into AF and after that PSVT in PACU, s/p Amio bolus.  This am he has no c/o, AF rate better ctr with only v brief periods of elevated HR, but overall remains in the teens with HR    Vital Signs Last 24 Hrs  T(C): 36.7 (21 May 2021 09:11), Max: 36.7 (20 May 2021 11:27)  T(F): 98 (21 May 2021 09:11), Max: 98.1 (20 May 2021 11:27)  HR: 93 (21 May 2021 10:00) (50 - 137)  BP: 119/73 (21 May 2021 09:00) (102/77 - 143/91)  BP(mean): 85 (21 May 2021 09:00) (65 - 102)  RR: 16 (21 May 2021 10:00) (11 - 20)  SpO2: 97% (21 May 2021 10:00) (94% - 100%)      PHYSICAL EXAM:  GENERAL: NAD, well-groomed, well-developed  HEAD:  Atraumatic, Normocephalic  EYES: EOMI, PERRLA, conjunctiva and sclera clear  HEENT: Moist mucous membranes  NECK: Supple, No JVD  NERVOUS SYSTEM:  Alert & Oriented X3, Motor Strength 5/5 B/L upper and lower extremities; DTRs 2+ intact and symmetric  CHEST/LUNG: Clear to auscultation bilaterally; No rales, rhonchi, wheezing, or rubs  HEART: irregular; No murmurs, rubs, or gallops  ABDOMEN: Soft, Nontender, Nondistended; Bowel sounds present  GENITOURINARY- Voiding, no palpable bladder  EXTREMITIES:  2+ Peripheral Pulses, No clubbing, cyanosis, or edema  MUSCULOSKELTAL- s/p TKR-R  SKIN-no rash, no lesion  CNS- alert, oriented X3, non focal                           12.2   14.22 )-----------( 124      ( 21 May 2021 05:36 )             36.7   05-21    139  |  109<H>  |  23  ----------------------------<  157<H>  4.8   |  27  |  0.92    Ca    8.3<L>      21 May 2021 05:36      Assessment/Plan  #S/p LT TKR  POD#1  PT as tolerated  AC by Eliquis  Bowel regimen  leukocytosis repeat in am      #PAF  S/p Amiodarone bolus  Rate is better controlled  Will monitor on tele postop  AC by Eliquis  Cont BB    #Seizure disorder  Cont Keppra 500mg BID    #DVT prop- Eliquis    FAsting 157. a1c 5.8; to f/up as outpatient

## 2021-05-21 NOTE — PROGRESS NOTE ADULT - ASSESSMENT
Increase beta blocker dose.   If still tachycardic will add Amiodarone for better rate control   Continue anticoagulation PAT  Chronic Atrial fibrillation  Moderate AS    Suggest:    Increase beta blocker dose.   If still tachycardic will add Amiodarone for better rate control   Continue anticoagulation

## 2021-05-21 NOTE — PROGRESS NOTE ADULT - SUBJECTIVE AND OBJECTIVE BOX
CURRENT CARDIAC WORKUP:       Echo:  Stress Test:  Cardiac Cath:    Allergies:   No Known Allergies      MEDICATIONS  (STANDING):  acetaminophen   Tablet .. 975 milliGRAM(s) Oral every 8 hours  apixaban 5 milliGRAM(s) Oral every 12 hours  ascorbic acid 500 milliGRAM(s) Oral two times a day  calcium carbonate 1250 mG  + Vitamin D (OsCal 500 + D) 1 Tablet(s) Oral three times a day  celecoxib 200 milliGRAM(s) Oral every 12 hours  ferrous    sulfate 325 milliGRAM(s) Oral daily  folic acid 1 milliGRAM(s) Oral daily  levETIRAcetam 500 milliGRAM(s) Oral two times a day  metoprolol tartrate 50 milliGRAM(s) Oral two times a day  metoprolol tartrate Injectable 5 milliGRAM(s) IV Push once  multivitamin 1 Tablet(s) Oral daily  pantoprazole    Tablet 40 milliGRAM(s) Oral before breakfast  polyethylene glycol 3350 17 Gram(s) Oral at bedtime  senna 2 Tablet(s) Oral at bedtime    MEDICATIONS  (PRN):  HYDROmorphone  Injectable 0.5 milliGRAM(s) IV Push every 10 minutes PRN Moderate Pain (4 - 6)  HYDROmorphone  Injectable 0.5 milliGRAM(s) SubCutaneous every 4 hours PRN Severe Pain (7 - 10)  ondansetron Injectable 8 milliGRAM(s) IV Push every 8 hours PRN Nausea and/or Vomiting  oxyCODONE    IR 5 milliGRAM(s) Oral every 4 hours PRN Mild Pain (1 - 3)  oxyCODONE    IR 10 milliGRAM(s) Oral every 4 hours PRN Moderate Pain (4 - 6)      ROS:     .ros      Vital Signs Last 24 Hrs  T(C): 36.7 (21 May 2021 09:11), Max: 36.7 (20 May 2021 11:27)  T(F): 98 (21 May 2021 09:11), Max: 98.1 (20 May 2021 11:27)  HR: 93 (21 May 2021 10:00) (50 - 137)  BP: 119/73 (21 May 2021 09:00) (102/77 - 143/91)  BP(mean): 85 (21 May 2021 09:00) (65 - 102)  RR: 16 (21 May 2021 10:00) (11 - 20)  SpO2: 97% (21 May 2021 10:00) (94% - 100%)    I&O's Summary    20 May 2021 07:01  -  21 May 2021 07:00  --------------------------------------------------------  IN: 2675 mL / OUT: 360 mL / NET: 2315 mL    21 May 2021 07:01  -  21 May 2021 10:54  --------------------------------------------------------  IN: 315 mL / OUT: 0 mL / NET: 315 mL        PHYSICAL EXAM:    .phy      TELEMETRY:    ECG:    LABS:                        12.2   14.22 )-----------( 124      ( 21 May 2021 05:36 )             36.7     05-21    139  |  109<H>  |  23  ----------------------------<  157<H>  4.8   |  27  |  0.92    Ca    8.3<L>      21 May 2021 05:36                        RADIOLOGY & ADDITIONAL STUDIES:     77 yo male is s/p left TKR. Noted to have tachycardia when he was brought in to the OR. Given IV Lopressor 5 mg X 3 doses (total 15 mg) and slowed down to proceed with surgery under spinal anesthesia. Post op noted to have narrow complex tachycardia which seemed to be regular. Patient has h/o PAF and PAT. IV Adenosine given, rhythm slowed down transiently, no P waves noted. Tachycardia resumed. Patient is hemodynamically stable and asymptomatic.     Feels OK today. Chronic AF.  Still occasional rapid ventricular rate.     PAST MEDICAL & SURGICAL HISTORY:  Atrial fibrillation  PAT  Moderate AS  Cerebrovascular accident 2011 deficit: balance dizziness and weakness both hands  Seizure disorder last seizure 2019  Psoriatic arthritis  Rib fracture left side  Last week of April 2021  History of lumbar spinal fusion  H/O total knee replacement, right  History of tonsillectomy  History of cataract surgery 2017      PREVIOUS CARDIAC WORKUP:      Echo:  5/11/21  --There is moderate left atrial dilatation (LA volume index 44 ml/m²).  --There is mild left ventricular hypertrophy.  --LV global wall motion is normal.  --LV ejection fraction (62 %) is normal.  --There is mild right atrial dilatation.  --The aortic valve is mildly calcified. There is moderate aortic stenosis. The aortic valve area, by the continuity equation, is 1.21 cm².  --There is mitral annular calcification. There is mild mitral regurgitation.  --There is mild tricuspid regurgitation.  --There is mild pulmonic regurgitation.  --The right atrial pressure is normal (0 - 5 mm Hg). There is no pulmonary hypertension.  --There is no pericardial effusion.    Nuclear stress test:  5/14/21  ·	There is a small size defect, of moderate severity, in the apical wall(s), that is predominantly fixed, consistent with soft tissue attenuation.  ·	Rest gated analysis showed normal left ventricular function with normal LV size.  ·	Post-stress analysis showed normal LV function.  ·	Gated wall motion analysis shows normal wall motion.  ·	Overall impression: Probably normal.  ·	Summary: Left ventricular perfusion is probably normal.  ·	No ECG evidence of ischemia after administration of IV regadenoson.  ·	SPECT results are limited by artifact and show no definite ischemia or infarct.      ALLERGIES:    No Known Allergies      MEDICATIONS  (STANDING):  acetaminophen   Tablet .. 975 milliGRAM(s) Oral every 8 hours  apixaban 5 milliGRAM(s) Oral every 12 hours  ascorbic acid 500 milliGRAM(s) Oral two times a day  calcium carbonate 1250 mG  + Vitamin D (OsCal 500 + D) 1 Tablet(s) Oral three times a day  celecoxib 200 milliGRAM(s) Oral every 12 hours  ferrous    sulfate 325 milliGRAM(s) Oral daily  folic acid 1 milliGRAM(s) Oral daily  levETIRAcetam 500 milliGRAM(s) Oral two times a day  metoprolol tartrate 50 milliGRAM(s) Oral two times a day  metoprolol tartrate Injectable 5 milliGRAM(s) IV Push once  multivitamin 1 Tablet(s) Oral daily  pantoprazole    Tablet 40 milliGRAM(s) Oral before breakfast  polyethylene glycol 3350 17 Gram(s) Oral at bedtime  senna 2 Tablet(s) Oral at bedtime    MEDICATIONS  (PRN):  HYDROmorphone  Injectable 0.5 milliGRAM(s) IV Push every 10 minutes PRN Moderate Pain (4 - 6)  HYDROmorphone  Injectable 0.5 milliGRAM(s) SubCutaneous every 4 hours PRN Severe Pain (7 - 10)  ondansetron Injectable 8 milliGRAM(s) IV Push every 8 hours PRN Nausea and/or Vomiting  oxyCODONE    IR 5 milliGRAM(s) Oral every 4 hours PRN Mild Pain (1 - 3)  oxyCODONE    IR 10 milliGRAM(s) Oral every 4 hours PRN Moderate Pain (4 - 6)      ROS:     Detailed ten system ROS was performed and was negative except for history as eluded to above.    no fever  no chills  no nausea  no vomiting  no diarrhea  no constipation  no melena  no hematochezia  no chest pain  no palpitations  no sob at rest  no dyspnea on exertion  no cough  no wheezing  no anorexia  no headache  no dizziness  no syncope  no weakness  no myalgia  no dysuria  no polyuria  no hematuria       Vital Signs Last 24 Hrs  T(C): 36.7 (21 May 2021 09:11), Max: 36.7 (20 May 2021 11:27)  T(F): 98 (21 May 2021 09:11), Max: 98.1 (20 May 2021 11:27)  HR: 93 (21 May 2021 10:00) (50 - 137)  BP: 119/73 (21 May 2021 09:00) (102/77 - 143/91)  BP(mean): 85 (21 May 2021 09:00) (65 - 102)  RR: 16 (21 May 2021 10:00) (11 - 20)  SpO2: 97% (21 May 2021 10:00) (94% - 100%)    I&O's Summary    20 May 2021 07:01  -  21 May 2021 07:00  --------------------------------------------------------  IN: 2675 mL / OUT: 360 mL / NET: 2315 mL    21 May 2021 07:01  -  21 May 2021 10:54  --------------------------------------------------------  IN: 315 mL / OUT: 0 mL / NET: 315 mL        PHYSICAL EXAM:    General:                Comfortable, AAO X 3, in no distress.   HEENT:                  Atraumatic, PERRLA, EOMI, conjunctiva clear.   Neck:                     Supple, no adenopathy, no thyromegaly, no JVD, no bruit.  Chest:                    Clear, B/L symmetric air entry, no tachypnea  Heart:                     S1, S2 tachycardic, + murmur.  Abdomen:              Soft, non-tender, bowel sounds active. No palpable masses.  Extremities:           post op left knee.  Skin:                      Skin color, texture normal. No rashes.  Neurologic:            Grossly nonfocal.       TELEMETRY:  Rapid AF    ECG:  AF    LABS:                        12.2   14.22 )-----------( 124      ( 21 May 2021 05:36 )             36.7     05-21    139  |  109<H>  |  23  ----------------------------<  157<H>  4.8   |  27  |  0.92    Ca    8.3<L>      21 May 2021 05:36

## 2021-05-21 NOTE — PROGRESS NOTE ADULT - SUBJECTIVE AND OBJECTIVE BOX
Orthopedics     POD 0 Total Knee Arthroplasty  Pt seen and examined at the bedside. Pain is well controlled at this time. Pt reports feeling well. He went in AFIb w/ RVR post op which required rate/rhythm control and was admitted to SICU for post op monitoring. Pt is aware of the plan and agrees, no other concerns at this time.     Vital Signs Last 24 Hrs  T(C): 36.5 (05-21-21 @ 05:04), Max: 36.7 (05-20-21 @ 11:27)  T(F): 97.7 (05-21-21 @ 05:04), Max: 98.1 (05-20-21 @ 11:27)  HR: 106 (05-21-21 @ 08:00) (50 - 137)  BP: 118/92 (05-21-21 @ 08:00) (102/77 - 143/91)  BP(mean): 96 (05-21-21 @ 08:00) (65 - 102)  RR: 16 (05-21-21 @ 08:00) (11 - 20)  SpO2: 98% (05-21-21 @ 08:00) (94% - 100%)                        12.2   14.22 )-----------( 124      ( 21 May 2021 05:36 )             36.7     21 May 2021 05:36    139    |  109    |  23     ----------------------------<  157    4.8     |  27     |  0.92     Ca    8.3        21 May 2021 05:36          Exam:  GEN: NAD, awake and alert, cooperative.   LLE:  Dressing clean and dry  +EHL FHL TA GS  SILT L2-S1  +DP  Calf soft/nontender, compartments soft and compressible.

## 2021-05-22 LAB
HCT VFR BLD CALC: 38.7 % — LOW (ref 39–50)
HGB BLD-MCNC: 12.6 G/DL — LOW (ref 13–17)
MCHC RBC-ENTMCNC: 32 PG — SIGNIFICANT CHANGE UP (ref 27–34)
MCHC RBC-ENTMCNC: 32.6 GM/DL — SIGNIFICANT CHANGE UP (ref 32–36)
MCV RBC AUTO: 98.2 FL — SIGNIFICANT CHANGE UP (ref 80–100)
PLATELET # BLD AUTO: 126 K/UL — LOW (ref 150–400)
RBC # BLD: 3.94 M/UL — LOW (ref 4.2–5.8)
RBC # FLD: 13.2 % — SIGNIFICANT CHANGE UP (ref 10.3–14.5)
WBC # BLD: 14.46 K/UL — HIGH (ref 3.8–10.5)
WBC # FLD AUTO: 14.46 K/UL — HIGH (ref 3.8–10.5)

## 2021-05-22 PROCEDURE — 99233 SBSQ HOSP IP/OBS HIGH 50: CPT

## 2021-05-22 PROCEDURE — 99231 SBSQ HOSP IP/OBS SF/LOW 25: CPT

## 2021-05-22 RX ORDER — AMIODARONE HYDROCHLORIDE 400 MG/1
400 TABLET ORAL THREE TIMES A DAY
Refills: 0 | Status: DISCONTINUED | OUTPATIENT
Start: 2021-05-22 | End: 2021-05-24

## 2021-05-22 RX ADMIN — OXYCODONE HYDROCHLORIDE 10 MILLIGRAM(S): 5 TABLET ORAL at 17:07

## 2021-05-22 RX ADMIN — Medication 975 MILLIGRAM(S): at 07:30

## 2021-05-22 RX ADMIN — Medication 1 TABLET(S): at 10:23

## 2021-05-22 RX ADMIN — Medication 1 TABLET(S): at 05:47

## 2021-05-22 RX ADMIN — OXYCODONE HYDROCHLORIDE 10 MILLIGRAM(S): 5 TABLET ORAL at 09:14

## 2021-05-22 RX ADMIN — AMIODARONE HYDROCHLORIDE 400 MILLIGRAM(S): 400 TABLET ORAL at 22:45

## 2021-05-22 RX ADMIN — CELECOXIB 200 MILLIGRAM(S): 200 CAPSULE ORAL at 22:47

## 2021-05-22 RX ADMIN — APIXABAN 5 MILLIGRAM(S): 2.5 TABLET, FILM COATED ORAL at 22:47

## 2021-05-22 RX ADMIN — Medication 1 MILLIGRAM(S): at 10:23

## 2021-05-22 RX ADMIN — SENNA PLUS 2 TABLET(S): 8.6 TABLET ORAL at 22:47

## 2021-05-22 RX ADMIN — APIXABAN 5 MILLIGRAM(S): 2.5 TABLET, FILM COATED ORAL at 10:23

## 2021-05-22 RX ADMIN — POLYETHYLENE GLYCOL 3350 17 GRAM(S): 17 POWDER, FOR SOLUTION ORAL at 22:47

## 2021-05-22 RX ADMIN — Medication 500 MILLIGRAM(S): at 22:49

## 2021-05-22 RX ADMIN — Medication 975 MILLIGRAM(S): at 05:47

## 2021-05-22 RX ADMIN — OXYCODONE HYDROCHLORIDE 10 MILLIGRAM(S): 5 TABLET ORAL at 04:37

## 2021-05-22 RX ADMIN — PANTOPRAZOLE SODIUM 40 MILLIGRAM(S): 20 TABLET, DELAYED RELEASE ORAL at 06:18

## 2021-05-22 RX ADMIN — Medication 50 MILLIGRAM(S): at 22:48

## 2021-05-22 RX ADMIN — CELECOXIB 200 MILLIGRAM(S): 200 CAPSULE ORAL at 10:23

## 2021-05-22 RX ADMIN — CELECOXIB 200 MILLIGRAM(S): 200 CAPSULE ORAL at 11:23

## 2021-05-22 RX ADMIN — Medication 975 MILLIGRAM(S): at 22:46

## 2021-05-22 RX ADMIN — OXYCODONE HYDROCHLORIDE 10 MILLIGRAM(S): 5 TABLET ORAL at 08:25

## 2021-05-22 RX ADMIN — Medication 325 MILLIGRAM(S): at 10:24

## 2021-05-22 RX ADMIN — AMIODARONE HYDROCHLORIDE 400 MILLIGRAM(S): 400 TABLET ORAL at 13:47

## 2021-05-22 RX ADMIN — Medication 1 TABLET(S): at 13:47

## 2021-05-22 RX ADMIN — AMIODARONE HYDROCHLORIDE 400 MILLIGRAM(S): 400 TABLET ORAL at 05:47

## 2021-05-22 RX ADMIN — Medication 50 MILLIGRAM(S): at 10:23

## 2021-05-22 RX ADMIN — LEVETIRACETAM 500 MILLIGRAM(S): 250 TABLET, FILM COATED ORAL at 22:47

## 2021-05-22 RX ADMIN — Medication 500 MILLIGRAM(S): at 10:24

## 2021-05-22 RX ADMIN — LEVETIRACETAM 500 MILLIGRAM(S): 250 TABLET, FILM COATED ORAL at 10:23

## 2021-05-22 RX ADMIN — Medication 1 TABLET(S): at 22:45

## 2021-05-22 RX ADMIN — Medication 975 MILLIGRAM(S): at 13:47

## 2021-05-22 RX ADMIN — OXYCODONE HYDROCHLORIDE 10 MILLIGRAM(S): 5 TABLET ORAL at 04:07

## 2021-05-22 NOTE — PROGRESS NOTE ADULT - SUBJECTIVE AND OBJECTIVE BOX
HPI:  Patient is a 76y old  Male who presents with a chief complaint of left knee pain s/p left total knee replacement 21.      Consulted by Dr. Etienne   for VTE prophylaxis, risk stratification, and anticoagulation management.    PAST MEDICAL & SURGICAL HISTORY:  Atrial fibrillation  paroxysmal    Cardiac arrhythmia  PAT    Cerebrovascular accident   deficit: balance dizziness and weakness both hands    Seizure disorder  last seizure     Psoriatic arthritis    Rib fracture  left side  Last week of 2021    History of lumbar spinal fusion    H/O total knee replacement, right  &#x27;s    History of tonsillectomy    History of cataract surgery          Interval History:  : Patient seen at bedside in PACU. urretnly in rapid AF versus SVT awaiting adenosine IV- management per Dr. Petit. In no apparent distress. Explained will resume Eliquis tonight. Patient denies any questions at this time.  21: Patient seen at bedside with PT. He was OOB to chair with rate 140's steady AF. Asymptomatic. About to get up again with PT and did ambulate in hallway with walker. Advised of resumption of Eliquis last night. Patient verbalized understanding.  21: Patient seen at bedside OOB to chair with intermittent dozing, but arousable. Unsure as far as rehab versus home as of yet. Still on usual eliquis 5 mg twice daily for AF. HH stable.    BMI: 28.2    CrCl: pending labs    Incision Time: 1306  Procedure End Time:1443  EBL: 150    Caprini VTE Risk Score:  CAPRINI SCORE  AGE RELATED RISK FACTORS                                                       MOBILITY RELATED FACTORS  [ ] Age 41-60 years                                            (1 Point)                  [ ] Bed rest /restricted mobility                      (1 Point)  [ ] Age: 61-74 years                                           (2 Points)                [ ] Plaster cast                                                   (2 Points)  [ ]x Age= 75 years                                              (3 Points)                 [ ] Bed bound for more than 72 hours                   (2 Points)    DISEASE RELATED RISK FACTORS                                               GENDER SPECIFIC FACTORS  [ ] Edema in the lower extremities                       (1 Point)           [ ] Pregnancy                                                            (1 Point)  [ ] Varicose veins                                               (1 Point)                  [ ] Post-partum < 6 weeks                                      (1 Point)             [x ] BMI > 25 Kg/m2                                            (1 Point)                  [ ] Hormonal therapy or oral contraception       (1 Point)                 [ ] Sepsis (in the previous month)                        (1 Point)             [ ] History of pregnancy complications                (1Point)  [ ] Pneumonia or serious lung disease                                             [ ] Unexplained or recurrent  (=/>3), premature                                 (In the previous month)                               (1 Point)                birth with toxemia or growth-restricted infant (1 Point)  [ ] Abnormal pulmonary function test            (1 Point)                                   SURGERY RELATED RISK FACTORS  [ ] Acute myocardial infarction                       (1 Point)                  [ ]  Section                                         (1 Point)  [ ] Congestive heart failure (in the previous month) (1 Point)   [ ] Minor surgery   lasting <45 minutes       (1 Point)   [ ] Inflammatory bowel disease                             (1 Point)          [ ] Arthroscopic surgery                                  (2 Points)  [ ] Central venous access                                    (2 Points)            [ ] General surgery lasting >45 minutes      (2 Points)       [ ] Stroke (in the previous month)                  (5 Points)            [x ] Elective major lower extremity arthroplasty (5 Points)                                   [  ] Malignancy (present or past include skin melanoma                                          but exclude  basal skin cell)    (2 points)                                      TRAUMA RELATED RISK FACTORS                HEMATOLOGY RELATED FACTORS                                  [ ] Fracture of the hip, pelvis, or leg                       (5 Points)  [ ] Prior episodes of VTE                                     (3 Points)          [ ] Acute spinal cord injury (in the previous month)  (5 Points)  [ ] Positive family history for VTE                         (3 Points)       [ ] Paralysis (less than 1 month)                          (5 Points)  [ ] Prothrombin 39828 A                                      (3 Points)         [ ] Multiple Trauma (within 1month)                 (5Points)                                                                                                                                                                [ ] Factor V Leiden                                          (3 Points)                                OTHER RISK FACTORS                          [ ] Lupus anticoagulants                                     (3 Points)                     [ ] BMI > 40                          (1 Point)                                                         [ ] Anticardiolipin antibodies                                (3 Points)                 [ ] Smoking                              (1Point)                                                [ ] High homocysteine in the blood                      (3 Points)                [  ] Diabetes requiring insulin (1point)                         [ ] Other congenital or acquired thrombophilia       (3 Points)          [  ] Chemotherapy                   (1 Point)  [ ] Heparin induced thrombocytopenia                  (3 Points)             [  ] Blood Transfusion                (1 point)                                                                                                             Total Score [    9      ]                                                                                                                                                                                                                                                                                                                                                                                                                                               [id number] Score: 4    IMPROVE Bleeding Risk Score:3.5      Falls Risk:   High ( x )  Mod (  )  Low (  )      FAMILY HISTORY:  FH: stroke (Father)    FH: arthritis (Father)      Denies any personal or familial history of clotting or bleeding disorders.    Allergies    No Known Allergies    Intolerances        REVIEW OF SYSTEMS    (  )Fever	        (  )Constipation	(  )SOB				  (  )Headache   (  )Dysuria  (  )Chills	        (  )Melena	        (  )Dyspnea on exertion (  )Dizziness    (  )Polyuria  (  )Nausea      (  )Hematochezia	(  )Cough                          (  )Syncope      (  )Hematuria  (  )Vomiting   (  )Chest Pain	        (  )Wheezing			  (  )Weakness  (  )Diarrhea    (  )Palpitations	(  )Anorexia			  ( x )Myalgia       (   ) Arthralgia    Pertinent positives in HPI and daily subjective. All other systems negative.    Vital Signs Last 24 Hrs  T(C): 36.1 (21 @ 09:13), Max: 36.7 (21 @ 22:00)  T(F): 97 (21 @ 09:13), Max: 98 (21 @ 22:00)  HR: 80 (21 @ 13:00) (79 - 131)  BP: 111/71 (21 @ 13:00) (94/68 - 138/58)  BP(mean): 81 (21 @ 13:00) (67 - 101)  RR: 20 (21 @ 13:00) (13 - 24)  SpO2: 98% (21 @ :00) (95% - 100%)      PHYSICAL EXAM:    Constitutional: Appears Well    Neurological: A& O x 3    Skin: Warm    Respiratory and Thorax: normal effort; Breath sounds: normal; No rales/wheezing/rhonchi  	  Cardiovascular: S1, S2, irregular, NMBR	MP: rate  137bpm AF    Gastrointestinal: BS + x 4Q, nontender	    Genitourinary:  Bladder nondistended, nontender    Musculoskeletal:   General Right:   no muscle/joint tenderness,   normal tone, no joint swelling,   ROM: full	    General Left:   no muscle/joint tenderness,   normal tone, no joint swelling,   ROM: limited    Knee:  Left: Incision: ; Dressing CDI    Lower extrems:   Right: no calf tenderness              negative misael's sign               + pedal pulses    Left:   no calf tenderness              negative misael's sign               + pedal pulses    		    MEDICATIONS  (STANDING):  acetaminophen   Tablet .. 975 milliGRAM(s) Oral every 8 hours  aMIOdarone    Tablet 400 milliGRAM(s) Oral three times a day  apixaban 5 milliGRAM(s) Oral every 12 hours  ascorbic acid 500 milliGRAM(s) Oral two times a day  calcium carbonate 1250 mG  + Vitamin D (OsCal 500 + D) 1 Tablet(s) Oral three times a day  celecoxib 200 milliGRAM(s) Oral every 12 hours  ferrous    sulfate 325 milliGRAM(s) Oral daily  folic acid 1 milliGRAM(s) Oral daily  levETIRAcetam 500 milliGRAM(s) Oral two times a day  metoprolol tartrate 50 milliGRAM(s) Oral two times a day  multivitamin 1 Tablet(s) Oral daily  pantoprazole    Tablet 40 milliGRAM(s) Oral before breakfast  polyethylene glycol 3350 17 Gram(s) Oral at bedtime  senna 2 Tablet(s) Oral at bedtime    **Current DVT Prophylaxis:    LMWH                   (  )  Heparin SQ           (  )  Coumadin             (  )  Xarelto                  (  )  Eliquis                   ( x )  Venodynes           (x  )  Ambulation          ( x )  UFH                       (  )  ECASA                   (  )  Contraindicated  (  )

## 2021-05-22 NOTE — PROGRESS NOTE ADULT - ASSESSMENT
1. Elective left TKA - pain control, PT, bowel regimen    2. PAF with RVR in postop period - po amiodarone po BBL, pain control   - AC with eliquis    3. Seizure disorder - Keppra 500mg BID    4. DMII - HISS

## 2021-05-22 NOTE — PROGRESS NOTE ADULT - ASSESSMENT
A/P:   76yMale s/p L Total Knee Arthroplasty POD 2    -SICU care appreciated  -Pain control prn  -DVT/PE ppx  - WBAT/PT/OOB as tolerated   -Med Mgmt, continue home meds.  -Cardiology recs appreciated  - FU am Labs  - FU PT eval: TBD  -Will discuss care with Dr Etienne

## 2021-05-22 NOTE — PROGRESS NOTE ADULT - ASSESSMENT
This is a 76 year old male s/p left total knee replacment with atrial fibrillation, [id number] #4 on Eliquis at home with high risk for VTE due to history CVA, surgery, impaired mobility, and BMI. He is low risk for bleeding.    Plan:  ::Continue Eliquis 5mg PO twice daily  ::Daily CBC/BMP  ::Venodynes b/l  ::Enc ambulation per PT eval    Will continue to follow.  Dispo: home versus rehab

## 2021-05-22 NOTE — PROGRESS NOTE ADULT - SUBJECTIVE AND OBJECTIVE BOX
76 y.o. male with PMHx of PAF, psoriatic arthritis, seizure disorder, OA s/p prior RT TKR presented for elective LT TKR. Postoperative course complicated by Afib with RVR s/p IV amiodarone boluses and now po + increased dose of BBL.  Pt is comfortable in the chair, mildly elevated HR, pain 8/10 in left knee, tolerated PT  Other ROS reviewed and neg     Vital Signs Last 24 Hrs  T(C): 36.5 (22 May 2021 06:11), Max: 36.8 (21 May 2021 13:39)  T(F): 97.7 (22 May 2021 06:11), Max: 98.3 (21 May 2021 13:39)  HR: 105 (22 May 2021 08:00) (83 - 131)  BP: 138/58 (22 May 2021 08:00) (105/59 - 138/58)  BP(mean): 75 (22 May 2021 08:00) (67 - 96)  RR: 21 (22 May 2021 08:00) (13 - 24)  SpO2: 95% (22 May 2021 08:00) (95% - 100%)  I&O's Detail    21 May 2021 07:01  -  22 May 2021 07:00  --------------------------------------------------------  IN:    Lactated Ringers: 75 mL    Oral Fluid: 720 mL  Total IN: 795 mL    OUT:    Voided (mL): 1450 mL  Total OUT: 1450 mL    Total NET: -655 mL                        12.6   14.46 )-----------( 126      ( 22 May 2021 06:17 )             38.7     21 May 2021 05:36    139    |  109    |  23     ----------------------------<  157    4.8     |  27     |  0.92     Ca    8.3        21 May 2021 05:36    CAPILLARY BLOOD GLUCOSE    POCT Blood Glucose.: 92 mg/dL (22 May 2021 06:04)  POCT Blood Glucose.: 111 mg/dL (21 May 2021 18:39)    MEDICATIONS  (STANDING):  acetaminophen   Tablet .. 975 milliGRAM(s) Oral every 8 hours  aMIOdarone    Tablet 400 milliGRAM(s) Oral three times a day  apixaban 5 milliGRAM(s) Oral every 12 hours  ascorbic acid 500 milliGRAM(s) Oral two times a day  calcium carbonate 1250 mG  + Vitamin D (OsCal 500 + D) 1 Tablet(s) Oral three times a day  celecoxib 200 milliGRAM(s) Oral every 12 hours  ferrous    sulfate 325 milliGRAM(s) Oral daily  folic acid 1 milliGRAM(s) Oral daily  levETIRAcetam 500 milliGRAM(s) Oral two times a day  metoprolol tartrate 50 milliGRAM(s) Oral two times a day  multivitamin 1 Tablet(s) Oral daily  pantoprazole    Tablet 40 milliGRAM(s) Oral before breakfast  polyethylene glycol 3350 17 Gram(s) Oral at bedtime  senna 2 Tablet(s) Oral at bedtime    MEDICATIONS  (PRN):  bisacodyl Suppository 10 milliGRAM(s) Rectal once PRN Constipation  HYDROmorphone  Injectable 0.5 milliGRAM(s) IV Push every 10 minutes PRN Moderate Pain (4 - 6)  HYDROmorphone  Injectable 0.5 milliGRAM(s) SubCutaneous every 4 hours PRN Severe Pain (7 - 10)  ondansetron Injectable 8 milliGRAM(s) IV Push every 8 hours PRN Nausea and/or Vomiting  oxyCODONE    IR 5 milliGRAM(s) Oral every 4 hours PRN Mild Pain (1 - 3)  oxyCODONE    IR 10 milliGRAM(s) Oral every 4 hours PRN Moderate Pain (4 - 6)    RADIOLOGY: personally visualized    PHYSICAL EXAM:    General: male in no acute distress  Eyes: PERRLA, EOMI; conjunctiva and sclera clear  Head: Normocephalic; atraumatic  ENMT: No nasal discharge; airway clear  Neck: Supple; non tender; no masses  Respiratory: No wheezes, rales or rhonchi  Cardiovascular: S1, S2 irreg, mild RVR  Gastrointestinal: Soft non-tender non-distended; Normal bowel sounds  Genitourinary: No costovertebral angle tenderness  Extremities: decreased range of motion in BL knees L>R, No clubbing, cyanosis or edema  Vascular: Peripheral pulses palpable 2+ bilaterally  Neurological: Alert and oriented x4  Skin: Warm and dry.   Musculoskeletal: Normal tone, without deformities  Psychiatric: Cooperative and appropriate

## 2021-05-22 NOTE — PROGRESS NOTE ADULT - SUBJECTIVE AND OBJECTIVE BOX
Orthopedics     POD 2 Total Knee Arthroplasty  Pt seen and examined at the bedside. Pain is well controlled at this time. Pt reports feeling well. Pt currently in SICU for cardiac monitoring. Pt is aware of the plan and agrees, no other concerns at this time.     Vital Signs Last 24 Hrs  T(C): 36.5 (22 May 2021 06:11), Max: 36.8 (21 May 2021 13:39)  T(F): 97.7 (22 May 2021 06:11), Max: 98.3 (21 May 2021 13:39)  HR: 105 (22 May 2021 08:00) (83 - 131)  BP: 138/58 (22 May 2021 08:00) (105/59 - 138/58)  BP(mean): 75 (22 May 2021 08:00) (67 - 96)  RR: 21 (22 May 2021 08:00) (13 - 24)  SpO2: 95% (22 May 2021 08:00) (95% - 100%)          Exam:  GEN: NAD, awake and alert, cooperative.   LLE:  Dressing clean and dry  +EHL FHL TA GS  SILT L2-S1  +DP  Calf soft/nontender, compartments soft and compressible.

## 2021-05-23 ENCOUNTER — TRANSCRIPTION ENCOUNTER (OUTPATIENT)
Age: 76
End: 2021-05-23

## 2021-05-23 LAB
ANION GAP SERPL CALC-SCNC: 4 MMOL/L — LOW (ref 5–17)
BUN SERPL-MCNC: 22 MG/DL — SIGNIFICANT CHANGE UP (ref 7–23)
CALCIUM SERPL-MCNC: 9 MG/DL — SIGNIFICANT CHANGE UP (ref 8.5–10.1)
CHLORIDE SERPL-SCNC: 109 MMOL/L — HIGH (ref 96–108)
CO2 SERPL-SCNC: 26 MMOL/L — SIGNIFICANT CHANGE UP (ref 22–31)
CREAT SERPL-MCNC: 0.78 MG/DL — SIGNIFICANT CHANGE UP (ref 0.5–1.3)
GLUCOSE SERPL-MCNC: 91 MG/DL — SIGNIFICANT CHANGE UP (ref 70–99)
HCT VFR BLD CALC: 31.9 % — LOW (ref 39–50)
HGB BLD-MCNC: 10.9 G/DL — LOW (ref 13–17)
MCHC RBC-ENTMCNC: 33.2 PG — SIGNIFICANT CHANGE UP (ref 27–34)
MCHC RBC-ENTMCNC: 34.2 GM/DL — SIGNIFICANT CHANGE UP (ref 32–36)
MCV RBC AUTO: 97.3 FL — SIGNIFICANT CHANGE UP (ref 80–100)
PLATELET # BLD AUTO: 109 K/UL — LOW (ref 150–400)
POTASSIUM SERPL-MCNC: 4.3 MMOL/L — SIGNIFICANT CHANGE UP (ref 3.5–5.3)
POTASSIUM SERPL-SCNC: 4.3 MMOL/L — SIGNIFICANT CHANGE UP (ref 3.5–5.3)
RBC # BLD: 3.28 M/UL — LOW (ref 4.2–5.8)
RBC # FLD: 13.2 % — SIGNIFICANT CHANGE UP (ref 10.3–14.5)
SARS-COV-2 RNA SPEC QL NAA+PROBE: SIGNIFICANT CHANGE UP
SODIUM SERPL-SCNC: 139 MMOL/L — SIGNIFICANT CHANGE UP (ref 135–145)
WBC # BLD: 10.22 K/UL — SIGNIFICANT CHANGE UP (ref 3.8–10.5)
WBC # FLD AUTO: 10.22 K/UL — SIGNIFICANT CHANGE UP (ref 3.8–10.5)

## 2021-05-23 PROCEDURE — 99233 SBSQ HOSP IP/OBS HIGH 50: CPT

## 2021-05-23 PROCEDURE — 99231 SBSQ HOSP IP/OBS SF/LOW 25: CPT

## 2021-05-23 RX ORDER — METOPROLOL TARTRATE 50 MG
1 TABLET ORAL
Qty: 0 | Refills: 0 | DISCHARGE

## 2021-05-23 RX ORDER — METOPROLOL TARTRATE 50 MG
1 TABLET ORAL
Qty: 60 | Refills: 0
Start: 2021-05-23 | End: 2021-06-21

## 2021-05-23 RX ORDER — OXYCODONE HYDROCHLORIDE 5 MG/1
1 TABLET ORAL
Qty: 0 | Refills: 0 | DISCHARGE

## 2021-05-23 RX ORDER — METOPROLOL TARTRATE 50 MG
75 TABLET ORAL
Refills: 0 | Status: DISCONTINUED | OUTPATIENT
Start: 2021-05-23 | End: 2021-05-24

## 2021-05-23 RX ORDER — HYDROCORTISONE 20 MG
0 TABLET ORAL
Qty: 0 | Refills: 0 | DISCHARGE

## 2021-05-23 RX ORDER — AMIODARONE HYDROCHLORIDE 400 MG/1
2 TABLET ORAL
Qty: 60 | Refills: 0
Start: 2021-05-23 | End: 2021-06-01

## 2021-05-23 RX ORDER — DICLOFENAC SODIUM 30 MG/G
0 GEL TOPICAL
Qty: 0 | Refills: 0 | DISCHARGE

## 2021-05-23 RX ORDER — OXYCODONE HYDROCHLORIDE 5 MG/1
1 TABLET ORAL
Qty: 20 | Refills: 0
Start: 2021-05-23

## 2021-05-23 RX ADMIN — CELECOXIB 200 MILLIGRAM(S): 200 CAPSULE ORAL at 09:36

## 2021-05-23 RX ADMIN — Medication 75 MILLIGRAM(S): at 17:26

## 2021-05-23 RX ADMIN — LEVETIRACETAM 500 MILLIGRAM(S): 250 TABLET, FILM COATED ORAL at 22:28

## 2021-05-23 RX ADMIN — AMIODARONE HYDROCHLORIDE 400 MILLIGRAM(S): 400 TABLET ORAL at 22:28

## 2021-05-23 RX ADMIN — Medication 975 MILLIGRAM(S): at 05:15

## 2021-05-23 RX ADMIN — APIXABAN 5 MILLIGRAM(S): 2.5 TABLET, FILM COATED ORAL at 09:37

## 2021-05-23 RX ADMIN — SENNA PLUS 2 TABLET(S): 8.6 TABLET ORAL at 22:28

## 2021-05-23 RX ADMIN — AMIODARONE HYDROCHLORIDE 400 MILLIGRAM(S): 400 TABLET ORAL at 13:51

## 2021-05-23 RX ADMIN — Medication 500 MILLIGRAM(S): at 09:37

## 2021-05-23 RX ADMIN — CELECOXIB 200 MILLIGRAM(S): 200 CAPSULE ORAL at 09:37

## 2021-05-23 RX ADMIN — Medication 975 MILLIGRAM(S): at 14:50

## 2021-05-23 RX ADMIN — OXYCODONE HYDROCHLORIDE 10 MILLIGRAM(S): 5 TABLET ORAL at 15:52

## 2021-05-23 RX ADMIN — Medication 1 TABLET(S): at 09:37

## 2021-05-23 RX ADMIN — Medication 500 MILLIGRAM(S): at 22:28

## 2021-05-23 RX ADMIN — POLYETHYLENE GLYCOL 3350 17 GRAM(S): 17 POWDER, FOR SOLUTION ORAL at 22:28

## 2021-05-23 RX ADMIN — APIXABAN 5 MILLIGRAM(S): 2.5 TABLET, FILM COATED ORAL at 22:28

## 2021-05-23 RX ADMIN — LEVETIRACETAM 500 MILLIGRAM(S): 250 TABLET, FILM COATED ORAL at 09:37

## 2021-05-23 RX ADMIN — Medication 1 MILLIGRAM(S): at 09:37

## 2021-05-23 RX ADMIN — Medication 1 TABLET(S): at 13:51

## 2021-05-23 RX ADMIN — Medication 975 MILLIGRAM(S): at 22:28

## 2021-05-23 RX ADMIN — Medication 1 TABLET(S): at 05:16

## 2021-05-23 RX ADMIN — AMIODARONE HYDROCHLORIDE 400 MILLIGRAM(S): 400 TABLET ORAL at 05:16

## 2021-05-23 RX ADMIN — PANTOPRAZOLE SODIUM 40 MILLIGRAM(S): 20 TABLET, DELAYED RELEASE ORAL at 05:16

## 2021-05-23 RX ADMIN — OXYCODONE HYDROCHLORIDE 10 MILLIGRAM(S): 5 TABLET ORAL at 16:35

## 2021-05-23 RX ADMIN — Medication 325 MILLIGRAM(S): at 09:39

## 2021-05-23 RX ADMIN — OXYCODONE HYDROCHLORIDE 10 MILLIGRAM(S): 5 TABLET ORAL at 10:40

## 2021-05-23 RX ADMIN — Medication 975 MILLIGRAM(S): at 13:49

## 2021-05-23 RX ADMIN — Medication 50 MILLIGRAM(S): at 09:37

## 2021-05-23 RX ADMIN — Medication 1 TABLET(S): at 22:28

## 2021-05-23 RX ADMIN — OXYCODONE HYDROCHLORIDE 5 MILLIGRAM(S): 5 TABLET ORAL at 03:58

## 2021-05-23 RX ADMIN — CELECOXIB 200 MILLIGRAM(S): 200 CAPSULE ORAL at 22:28

## 2021-05-23 RX ADMIN — OXYCODONE HYDROCHLORIDE 10 MILLIGRAM(S): 5 TABLET ORAL at 11:36

## 2021-05-23 NOTE — PROGRESS NOTE ADULT - ASSESSMENT
1. Elective left TKA - pain control, PT, bowel regimen    2. PAF with RVR in postop period - po amiodarone + po BBL, pain control   - AC with eliquis    3. Seizure disorder - Keppra 500mg BID    4. DMII - HISS    Medically stable for DC

## 2021-05-23 NOTE — DISCHARGE NOTE NURSING/CASE MANAGEMENT/SOCIAL WORK - PATIENT PORTAL LINK FT
You can access the FollowMyHealth Patient Portal offered by [facility] by registering at the following website: [email or web address]. By joining [employer]’s FollowMyHealth portal, you will also be able to view your health information using other applications (apps) compatible with our system.

## 2021-05-23 NOTE — PROGRESS NOTE ADULT - ASSESSMENT
This is a 76 year old male s/p left total knee replacment with atrial fibrillation, [id number] #4 on Eliquis at home with high risk for VTE due to history CVA, surgery, impaired mobility, and BMI. He is low risk for bleeding.    Plan:  ::Continue Eliquis 5mg PO twice daily  ::Daily CBC/BMP  ::Venodynes b/l  ::Enc ambulation per PT eval    Will continue to follow.  Dispo: home today 5/23

## 2021-05-23 NOTE — PROGRESS NOTE ADULT - ASSESSMENT
Assessment:  76y Male s/p LEFT Total Knee Arthroplasty POD #3    -Pain control  -VTE ppx: Eliquis  -WBAT/OOB with assistance as needed  -PT  -Ice and elevate  -Encourage incentive spirometry  -Medical management per primary team  -DC planning: pending PT eval today  -Will discuss with attending and will advise if plan changes.

## 2021-05-23 NOTE — PROGRESS NOTE ADULT - SUBJECTIVE AND OBJECTIVE BOX
Patient seen and examined at bedside, resting comfortably. Pain well controlled. Denies headache, lightheadedness, dizziness, chest pain, dyspnea, n/v, numbness/tingling. No complaints at this time.     LABS:                        12.6   14.46 )-----------( 126      ( 22 May 2021 06:17 )             38.7                   VITAL SIGNS  T(C): 36.4 (05-23-21 @ 05:15), Max: 36.6 (05-22-21 @ 15:21)  HR: 72 (05-23-21 @ 05:15) (68 - 109)  BP: 120/67 (05-23-21 @ 05:15) (94/68 - 138/58)  RR: 18 (05-23-21 @ 05:15) (14 - 21)  SpO2: 97% (05-23-21 @ 05:15) (95% - 100%)    PHYSICAL EXAM  General: NAD, Awake and Alert    LEFT Lower Extremity:  Dressing c/d/i  SCDs in place  L2-S1 SILT  +TA/EHL/FHL/GSC  + DP/PT pulses  Compartments soft and compressible  Calves nontender

## 2021-05-23 NOTE — PROGRESS NOTE ADULT - SUBJECTIVE AND OBJECTIVE BOX
76 y.o. male with PMHx of PAF, psoriatic arthritis, seizure disorder, OA s/p prior RT TKR presented for elective LT TKR. Postoperative course complicated by Afib with RVR s/p IV amiodarone boluses and now po + increased dose of BBL.  Pt is comfortable in the chair, mildly elevated HR - better controlled, pain control in left knee improved, tolerated PT  Other ROS reviewed and neg     Vital Signs Last 24 Hrs  T(C): 36.3 (23 May 2021 08:01), Max: 36.6 (22 May 2021 15:21)  T(F): 97.3 (23 May 2021 08:01), Max: 97.8 (22 May 2021 15:21)  HR: 70 (23 May 2021 08:01) (68 - 109)  BP: 121/59 (23 May 2021 08:01) (111/71 - 121/59)  BP(mean): 81 (22 May 2021 13:00) (81 - 81)  RR: 18 (23 May 2021 08:01) (14 - 20)  SpO2: 97% (23 May 2021 08:01) (97% - 100%)  I&O's Detail    22 May 2021 07:01  -  23 May 2021 07:00  --------------------------------------------------------  IN:  Total IN: 0 mL    OUT:    Voided (mL): 300 mL  Total OUT: 300 mL    Total NET: -300 mL      23 May 2021 07:01  -  23 May 2021 12:09  --------------------------------------------------------  IN:  Total IN: 0 mL    OUT:    Voided (mL): 150 mL  Total OUT: 150 mL    Total NET: -150 mL                       10.9   10.22 )-----------( 109      ( 23 May 2021 07:03 )             31.9     23 May 2021 07:03    139    |  109    |  22     ----------------------------<  91     4.3     |  26     |  0.78     Ca    9.0        23 May 2021 07:03    CAPILLARY BLOOD GLUCOSE  POCT Blood Glucose.: 109 mg/dL (22 May 2021 23:50)  POCT Blood Glucose.: 107 mg/dL (22 May 2021 17:04)    MEDICATIONS  (STANDING):  acetaminophen   Tablet .. 975 milliGRAM(s) Oral every 8 hours  aMIOdarone    Tablet 400 milliGRAM(s) Oral three times a day  apixaban 5 milliGRAM(s) Oral every 12 hours  ascorbic acid 500 milliGRAM(s) Oral two times a day  calcium carbonate 1250 mG  + Vitamin D (OsCal 500 + D) 1 Tablet(s) Oral three times a day  celecoxib 200 milliGRAM(s) Oral every 12 hours  ferrous    sulfate 325 milliGRAM(s) Oral daily  folic acid 1 milliGRAM(s) Oral daily  levETIRAcetam 500 milliGRAM(s) Oral two times a day  metoprolol tartrate 75 milliGRAM(s) Oral two times a day  multivitamin 1 Tablet(s) Oral daily  pantoprazole    Tablet 40 milliGRAM(s) Oral before breakfast  polyethylene glycol 3350 17 Gram(s) Oral at bedtime  senna 2 Tablet(s) Oral at bedtime    MEDICATIONS  (PRN):  bisacodyl Suppository 10 milliGRAM(s) Rectal once PRN Constipation  HYDROmorphone  Injectable 0.5 milliGRAM(s) IV Push every 10 minutes PRN Moderate Pain (4 - 6)  HYDROmorphone  Injectable 0.5 milliGRAM(s) SubCutaneous every 4 hours PRN Severe Pain (7 - 10)  ondansetron Injectable 8 milliGRAM(s) IV Push every 8 hours PRN Nausea and/or Vomiting  oxyCODONE    IR 5 milliGRAM(s) Oral every 4 hours PRN Mild Pain (1 - 3)  oxyCODONE    IR 10 milliGRAM(s) Oral every 4 hours PRN Moderate Pain (4 - 6)    RADIOLOGY: personally visualized    PHYSICAL EXAM:    General: male in no acute distress  Eyes: PERRLA, EOMI; conjunctiva and sclera clear  Head: Normocephalic; atraumatic  ENMT: No nasal discharge; airway clear  Neck: Supple; non tender; no masses  Respiratory: No wheezes, rales or rhonchi  Cardiovascular: S1, S2 irreg, mild RVR on and off  Gastrointestinal: Soft non-tender non-distended; Normal bowel sounds  Genitourinary: No costovertebral angle tenderness  Extremities: decreased range of motion in BL knees L>R, No clubbing, cyanosis or edema  Vascular: Peripheral pulses palpable 2+ bilaterally  Neurological: Alert and oriented x4  Skin: Warm and dry.   Musculoskeletal: Normal tone, without deformities  Psychiatric: Cooperative and appropriate

## 2021-05-23 NOTE — PROGRESS NOTE ADULT - SUBJECTIVE AND OBJECTIVE BOX
HPI:  Patient is a 76y old  Male who presents with a chief complaint of left knee pain s/p left total knee replacement 21.    Consulted by Dr. Etienne   for VTE prophylaxis, risk stratification, and anticoagulation management.    PAST MEDICAL & SURGICAL HISTORY:  Atrial fibrillation  paroxysmal    Cardiac arrhythmia  PAT    Cerebrovascular accident   deficit: balance dizziness and weakness both hands    Seizure disorder  last seizure     Psoriatic arthritis    Rib fracture  left side  Last week of 2021    History of lumbar spinal fusion    H/O total knee replacement, right  &#x27;s    History of tonsillectomy    History of cataract surgery          Interval History:  : Patient seen at bedside in PACU. urretnly in rapid AF versus SVT awaiting adenosine IV- management per Dr. Petit. In no apparent distress. Explained will resume Eliquis tonight. Patient denies any questions at this time.  21: Patient seen at bedside with PT. He was OOB to chair with rate 140's steady AF. Asymptomatic. About to get up again with PT and did ambulate in hallway with walker. Advised of resumption of Eliquis last night. Patient verbalized understanding.  21: Patient seen at bedside OOB to chair with intermittent dozing, but arousable. Unsure as far as rehab versus home as of yet. Still on usual eliquis 5 mg twice daily for AF. HH stable.  21: Patient seen at bedside OOB to chair on 3 [address]. He is preparing to go home today. Advised of lab coming to home to draw blood work next week. Patient verbalized understanding. Will follow up out-patient     BMI: 28.2    CrCl: pending labs    Incision Time: 1306  Procedure End Time:1443  EBL: 150    Caprini VTE Risk Score:  CAPRINI SCORE  AGE RELATED RISK FACTORS                                                       MOBILITY RELATED FACTORS  [ ] Age 41-60 years                                            (1 Point)                  [ ] Bed rest /restricted mobility                      (1 Point)  [ ] Age: 61-74 years                                           (2 Points)                [ ] Plaster cast                                                   (2 Points)  [ ]x Age= 75 years                                              (3 Points)                 [ ] Bed bound for more than 72 hours                   (2 Points)    DISEASE RELATED RISK FACTORS                                               GENDER SPECIFIC FACTORS  [ ] Edema in the lower extremities                       (1 Point)           [ ] Pregnancy                                                            (1 Point)  [ ] Varicose veins                                               (1 Point)                  [ ] Post-partum < 6 weeks                                      (1 Point)             [x ] BMI > 25 Kg/m2                                            (1 Point)                  [ ] Hormonal therapy or oral contraception       (1 Point)                 [ ] Sepsis (in the previous month)                        (1 Point)             [ ] History of pregnancy complications                (1Point)  [ ] Pneumonia or serious lung disease                                             [ ] Unexplained or recurrent  (=/>3), premature                                 (In the previous month)                               (1 Point)                birth with toxemia or growth-restricted infant (1 Point)  [ ] Abnormal pulmonary function test            (1 Point)                                   SURGERY RELATED RISK FACTORS  [ ] Acute myocardial infarction                       (1 Point)                  [ ]  Section                                         (1 Point)  [ ] Congestive heart failure (in the previous month) (1 Point)   [ ] Minor surgery   lasting <45 minutes       (1 Point)   [ ] Inflammatory bowel disease                             (1 Point)          [ ] Arthroscopic surgery                                  (2 Points)  [ ] Central venous access                                    (2 Points)            [ ] General surgery lasting >45 minutes      (2 Points)       [ ] Stroke (in the previous month)                  (5 Points)            [x ] Elective major lower extremity arthroplasty (5 Points)                                   [  ] Malignancy (present or past include skin melanoma                                          but exclude  basal skin cell)    (2 points)                                      TRAUMA RELATED RISK FACTORS                HEMATOLOGY RELATED FACTORS                                  [ ] Fracture of the hip, pelvis, or leg                       (5 Points)  [ ] Prior episodes of VTE                                     (3 Points)          [ ] Acute spinal cord injury (in the previous month)  (5 Points)  [ ] Positive family history for VTE                         (3 Points)       [ ] Paralysis (less than 1 month)                          (5 Points)  [ ] Prothrombin 49897 A                                      (3 Points)         [ ] Multiple Trauma (within 1month)                 (5Points)                                                                                                                                                                [ ] Factor V Leiden                                          (3 Points)                                OTHER RISK FACTORS                          [ ] Lupus anticoagulants                                     (3 Points)                     [ ] BMI > 40                          (1 Point)                                                         [ ] Anticardiolipin antibodies                                (3 Points)                 [ ] Smoking                              (1Point)                                                [ ] High homocysteine in the blood                      (3 Points)                [  ] Diabetes requiring insulin (1point)                         [ ] Other congenital or acquired thrombophilia       (3 Points)          [  ] Chemotherapy                   (1 Point)  [ ] Heparin induced thrombocytopenia                  (3 Points)             [  ] Blood Transfusion                (1 point)                                                                                                             Total Score [    9      ]                                                                                                                                                                                                                                                                                                                                                                                                                                               [id number] Score: 4    IMPROVE Bleeding Risk Score:3.5      Falls Risk:   High ( x )  Mod (  )  Low (  )      FAMILY HISTORY:  FH: stroke (Father)    FH: arthritis (Father)      Denies any personal or familial history of clotting or bleeding disorders.    Allergies    No Known Allergies    Intolerances        REVIEW OF SYSTEMS    (  )Fever	        (  )Constipation	(  )SOB				  (  )Headache   (  )Dysuria  (  )Chills	        (  )Melena	        (  )Dyspnea on exertion (  )Dizziness    (  )Polyuria  (  )Nausea      (  )Hematochezia	(  )Cough                          (  )Syncope      (  )Hematuria  (  )Vomiting   (  )Chest Pain	        (  )Wheezing			  (  )Weakness  (  )Diarrhea    (  )Palpitations	(  )Anorexia			  ( x )Myalgia       (   ) Arthralgia    Pertinent positives in HPI and daily subjective. All other systems negative.    Vital Signs Last 24 Hrs  T(C): 36.3 (21 @ 08:01), Max: 36.6 (21 @ 15:21)  T(F): 97.3 (21 @ 08:01), Max: 97.8 (21 @ 15:21)  HR: 70 (21 @ 08:01) (68 - 109)  BP: 121/59 (21 @ 08:01) (111/71 - 121/59)  BP(mean): 81 (21 @ 13:00) (81 - 101)  RR: 18 (21 @ 08:01) (14 - 20)  SpO2: 97% (21 @ 08:01) (96% - 100%)      PHYSICAL EXAM:    Constitutional: Appears Well    Neurological: A& O x 3    Skin: Warm    Respiratory and Thorax: normal effort; Breath sounds: normal; No rales/wheezing/rhonchi  	  Cardiovascular: S1, S2, irregular, NMBR	MP: rate  137bpm AF    Gastrointestinal: BS + x 4Q, nontender	    Genitourinary:  Bladder nondistended, nontender    Musculoskeletal:   General Right:   no muscle/joint tenderness,   normal tone, no joint swelling,   ROM: full	    General Left:   no muscle/joint tenderness,   normal tone, no joint swelling,   ROM: limited    Knee:  Left: Incision: ; Dressing CDI    Lower extrems:   Right: no calf tenderness              negative misael's sign               + pedal pulses    Left:   no calf tenderness              negative misael's sign               + pedal pulses    		    MEDICATIONS  (STANDING):  acetaminophen   Tablet .. 975 milliGRAM(s) Oral every 8 hours  aMIOdarone    Tablet 400 milliGRAM(s) Oral three times a day  apixaban 5 milliGRAM(s) Oral every 12 hours  ascorbic acid 500 milliGRAM(s) Oral two times a day  calcium carbonate 1250 mG  + Vitamin D (OsCal 500 + D) 1 Tablet(s) Oral three times a day  celecoxib 200 milliGRAM(s) Oral every 12 hours  ferrous    sulfate 325 milliGRAM(s) Oral daily  folic acid 1 milliGRAM(s) Oral daily  levETIRAcetam 500 milliGRAM(s) Oral two times a day  metoprolol tartrate 50 milliGRAM(s) Oral two times a day  multivitamin 1 Tablet(s) Oral daily  pantoprazole    Tablet 40 milliGRAM(s) Oral before breakfast  polyethylene glycol 3350 17 Gram(s) Oral at bedtime  senna 2 Tablet(s) Oral at bedtime    **Current DVT Prophylaxis:    LMWH                   (  )  Heparin SQ           (  )  Coumadin             (  )  Xarelto                  (  )  Eliquis                   ( x )  Venodynes           (x  )  Ambulation          ( x )  UFH                       (  )  ECASA                   (  )  Contraindicated  (  )

## 2021-05-24 VITALS
RESPIRATION RATE: 18 BRPM | HEART RATE: 76 BPM | DIASTOLIC BLOOD PRESSURE: 72 MMHG | SYSTOLIC BLOOD PRESSURE: 124 MMHG | OXYGEN SATURATION: 99 % | TEMPERATURE: 98 F

## 2021-05-24 LAB
ANION GAP SERPL CALC-SCNC: 3 MMOL/L — LOW (ref 5–17)
BUN SERPL-MCNC: 28 MG/DL — HIGH (ref 7–23)
CALCIUM SERPL-MCNC: 8.6 MG/DL — SIGNIFICANT CHANGE UP (ref 8.5–10.1)
CHLORIDE SERPL-SCNC: 107 MMOL/L — SIGNIFICANT CHANGE UP (ref 96–108)
CO2 SERPL-SCNC: 29 MMOL/L — SIGNIFICANT CHANGE UP (ref 22–31)
CREAT SERPL-MCNC: 0.93 MG/DL — SIGNIFICANT CHANGE UP (ref 0.5–1.3)
GLUCOSE SERPL-MCNC: 93 MG/DL — SIGNIFICANT CHANGE UP (ref 70–99)
HCT VFR BLD CALC: 31.4 % — LOW (ref 39–50)
HGB BLD-MCNC: 10.4 G/DL — LOW (ref 13–17)
MCHC RBC-ENTMCNC: 32.5 PG — SIGNIFICANT CHANGE UP (ref 27–34)
MCHC RBC-ENTMCNC: 33.1 GM/DL — SIGNIFICANT CHANGE UP (ref 32–36)
MCV RBC AUTO: 98.1 FL — SIGNIFICANT CHANGE UP (ref 80–100)
PLATELET # BLD AUTO: 138 K/UL — LOW (ref 150–400)
POTASSIUM SERPL-MCNC: 4.6 MMOL/L — SIGNIFICANT CHANGE UP (ref 3.5–5.3)
POTASSIUM SERPL-SCNC: 4.6 MMOL/L — SIGNIFICANT CHANGE UP (ref 3.5–5.3)
RBC # BLD: 3.2 M/UL — LOW (ref 4.2–5.8)
RBC # FLD: 13.1 % — SIGNIFICANT CHANGE UP (ref 10.3–14.5)
SODIUM SERPL-SCNC: 139 MMOL/L — SIGNIFICANT CHANGE UP (ref 135–145)
WBC # BLD: 9.03 K/UL — SIGNIFICANT CHANGE UP (ref 3.8–10.5)
WBC # FLD AUTO: 9.03 K/UL — SIGNIFICANT CHANGE UP (ref 3.8–10.5)

## 2021-05-24 PROCEDURE — 99232 SBSQ HOSP IP/OBS MODERATE 35: CPT

## 2021-05-24 PROCEDURE — 99231 SBSQ HOSP IP/OBS SF/LOW 25: CPT

## 2021-05-24 RX ORDER — POLYETHYLENE GLYCOL 3350 17 G/17G
17 POWDER, FOR SOLUTION ORAL
Qty: 1 | Refills: 0
Start: 2021-05-24 | End: 2021-06-06

## 2021-05-24 RX ORDER — AMIODARONE HYDROCHLORIDE 400 MG/1
1 TABLET ORAL
Qty: 30 | Refills: 0
Start: 2021-05-24 | End: 2021-06-22

## 2021-05-24 RX ORDER — PANTOPRAZOLE SODIUM 20 MG/1
1 TABLET, DELAYED RELEASE ORAL
Qty: 30 | Refills: 0
Start: 2021-05-24 | End: 2021-06-22

## 2021-05-24 RX ORDER — SENNA PLUS 8.6 MG/1
2 TABLET ORAL
Qty: 28 | Refills: 0
Start: 2021-05-24 | End: 2021-06-06

## 2021-05-24 RX ORDER — ACETAMINOPHEN 500 MG
2 TABLET ORAL
Qty: 84 | Refills: 0
Start: 2021-05-24 | End: 2021-06-06

## 2021-05-24 RX ADMIN — OXYCODONE HYDROCHLORIDE 10 MILLIGRAM(S): 5 TABLET ORAL at 12:28

## 2021-05-24 RX ADMIN — AMIODARONE HYDROCHLORIDE 400 MILLIGRAM(S): 400 TABLET ORAL at 15:27

## 2021-05-24 RX ADMIN — CELECOXIB 200 MILLIGRAM(S): 200 CAPSULE ORAL at 10:43

## 2021-05-24 RX ADMIN — Medication 500 MILLIGRAM(S): at 10:43

## 2021-05-24 RX ADMIN — Medication 75 MILLIGRAM(S): at 06:14

## 2021-05-24 RX ADMIN — Medication 975 MILLIGRAM(S): at 15:28

## 2021-05-24 RX ADMIN — Medication 325 MILLIGRAM(S): at 10:44

## 2021-05-24 RX ADMIN — PANTOPRAZOLE SODIUM 40 MILLIGRAM(S): 20 TABLET, DELAYED RELEASE ORAL at 06:15

## 2021-05-24 RX ADMIN — Medication 1 TABLET(S): at 15:27

## 2021-05-24 RX ADMIN — Medication 1 TABLET(S): at 10:44

## 2021-05-24 RX ADMIN — OXYCODONE HYDROCHLORIDE 10 MILLIGRAM(S): 5 TABLET ORAL at 13:30

## 2021-05-24 RX ADMIN — Medication 1 MILLIGRAM(S): at 10:43

## 2021-05-24 RX ADMIN — Medication 1 TABLET(S): at 06:15

## 2021-05-24 RX ADMIN — Medication 975 MILLIGRAM(S): at 06:14

## 2021-05-24 RX ADMIN — APIXABAN 5 MILLIGRAM(S): 2.5 TABLET, FILM COATED ORAL at 10:44

## 2021-05-24 RX ADMIN — LEVETIRACETAM 500 MILLIGRAM(S): 250 TABLET, FILM COATED ORAL at 10:44

## 2021-05-24 RX ADMIN — AMIODARONE HYDROCHLORIDE 400 MILLIGRAM(S): 400 TABLET ORAL at 06:15

## 2021-05-24 NOTE — PROGRESS NOTE ADULT - SUBJECTIVE AND OBJECTIVE BOX
Orthopedics    Patient seen and examined at bedside, resting comfortably. Pain well controlled. Denies headache, lightheadedness, dizziness, chest pain, dyspnea, n/v, numbness/tingling. No complaints at this time.           VITAL SIGNS  Vital Signs Last 24 Hrs  T(C): 36.6 (23 May 2021 20:38), Max: 36.6 (23 May 2021 20:38)  T(F): 97.9 (23 May 2021 20:38), Max: 97.9 (23 May 2021 20:38)  HR: 88 (23 May 2021 20:38) (88 - 120)  BP: 109/65 (23 May 2021 20:38) (109/65 - 118/78)  BP(mean): --  RR: 18 (23 May 2021 20:38) (18 - 18)  SpO2: 98% (23 May 2021 20:38) (98% - 98%)    PHYSICAL EXAM  General: NAD, Awake and Alert    LEFT Lower Extremity:  Dressing c/d/i  SCDs in place  L2-S1 SILT  +TA/EHL/FHL/GSC  + DP/PT pulses  Compartments soft and compressible  Calves nontender

## 2021-05-24 NOTE — PROGRESS NOTE ADULT - SUBJECTIVE AND OBJECTIVE BOX
CURRENT CARDIAC WORKUP:       Echo:  Stress Test:  Cardiac Cath:    Allergies:   No Known Allergies      MEDICATIONS  (STANDING):  acetaminophen   Tablet .. 975 milliGRAM(s) Oral every 8 hours  aMIOdarone    Tablet 400 milliGRAM(s) Oral three times a day  apixaban 5 milliGRAM(s) Oral every 12 hours  ascorbic acid 500 milliGRAM(s) Oral two times a day  calcium carbonate 1250 mG  + Vitamin D (OsCal 500 + D) 1 Tablet(s) Oral three times a day  celecoxib 200 milliGRAM(s) Oral every 12 hours  ferrous    sulfate 325 milliGRAM(s) Oral daily  folic acid 1 milliGRAM(s) Oral daily  levETIRAcetam 500 milliGRAM(s) Oral two times a day  metoprolol tartrate 75 milliGRAM(s) Oral two times a day  multivitamin 1 Tablet(s) Oral daily  pantoprazole    Tablet 40 milliGRAM(s) Oral before breakfast  polyethylene glycol 3350 17 Gram(s) Oral at bedtime  senna 2 Tablet(s) Oral at bedtime    MEDICATIONS  (PRN):  bisacodyl Suppository 10 milliGRAM(s) Rectal once PRN Constipation  HYDROmorphone  Injectable 0.5 milliGRAM(s) IV Push every 10 minutes PRN Moderate Pain (4 - 6)  HYDROmorphone  Injectable 0.5 milliGRAM(s) SubCutaneous every 4 hours PRN Severe Pain (7 - 10)  ondansetron Injectable 8 milliGRAM(s) IV Push every 8 hours PRN Nausea and/or Vomiting  oxyCODONE    IR 5 milliGRAM(s) Oral every 4 hours PRN Mild Pain (1 - 3)  oxyCODONE    IR 10 milliGRAM(s) Oral every 4 hours PRN Moderate Pain (4 - 6)      ROS:     .ros      Vital Signs Last 24 Hrs  T(C): 36.8 (24 May 2021 08:23), Max: 36.8 (24 May 2021 08:23)  T(F): 98.2 (24 May 2021 08:23), Max: 98.2 (24 May 2021 08:23)  HR: 76 (24 May 2021 08:23) (76 - 120)  BP: 124/72 (24 May 2021 08:23) (109/65 - 124/72)  BP(mean): --  RR: 18 (24 May 2021 08:23) (18 - 18)  SpO2: 99% (24 May 2021 08:23) (98% - 99%)    I&O's Summary    23 May 2021 07:01  -  24 May 2021 07:00  --------------------------------------------------------  IN: 0 mL / OUT: 150 mL / NET: -150 mL        PHYSICAL EXAM:    .phy      TELEMETRY:    ECG:    LABS:                        10.4   9.03  )-----------( 138      ( 24 May 2021 06:37 )             31.4     05-24    139  |  107  |  28<H>  ----------------------------<  93  4.6   |  29  |  0.93    Ca    8.6      24 May 2021 06:37                        RADIOLOGY & ADDITIONAL STUDIES:     77 yo male is s/p left TKR. Noted to have tachycardia when he was brought in to the OR. Given IV Lopressor 5 mg X 3 doses (total 15 mg) and slowed down to proceed with surgery under spinal anesthesia. Post op noted to have narrow complex tachycardia which seemed to be regular. Patient has h/o PAF and PAT. IV Adenosine given, rhythm slowed down transiently, no P waves noted. Tachycardia resumed. Patient is hemodynamically stable and asymptomatic. AF rate is better controlled with addition of amiodarone.     Feels OK today. Chronic AF. Awaiting transfer to sub acute rehab.     PAST MEDICAL & SURGICAL HISTORY:  Atrial fibrillation  PAT  Moderate AS  Cerebrovascular accident 2011 deficit: balance dizziness and weakness both hands  Seizure disorder last seizure 2019  Psoriatic arthritis  Rib fracture left side  Last week of April 2021  History of lumbar spinal fusion  H/O total knee replacement, right  History of tonsillectomy  History of cataract surgery 2017      PREVIOUS CARDIAC WORKUP:      Echo:  5/11/21  --There is moderate left atrial dilatation (LA volume index 44 ml/m²).  --There is mild left ventricular hypertrophy.  --LV global wall motion is normal.  --LV ejection fraction (62 %) is normal.  --There is mild right atrial dilatation.  --The aortic valve is mildly calcified. There is moderate aortic stenosis. The aortic valve area, by the continuity equation, is 1.21 cm².  --There is mitral annular calcification. There is mild mitral regurgitation.  --There is mild tricuspid regurgitation.  --There is mild pulmonic regurgitation.  --The right atrial pressure is normal (0 - 5 mm Hg). There is no pulmonary hypertension.  --There is no pericardial effusion.    Nuclear stress test:  5/14/21  ·	There is a small size defect, of moderate severity, in the apical wall(s), that is predominantly fixed, consistent with soft tissue attenuation.  ·	Rest gated analysis showed normal left ventricular function with normal LV size.  ·	Post-stress analysis showed normal LV function.  ·	Gated wall motion analysis shows normal wall motion.  ·	Overall impression: Probably normal.  ·	Summary: Left ventricular perfusion is probably normal.  ·	No ECG evidence of ischemia after administration of IV regadenoson.  ·	SPECT results are limited by artifact and show no definite ischemia or infarct.      ALLERGIES:    No Known Allergies      MEDICATIONS  (STANDING):  acetaminophen   Tablet .. 975 milliGRAM(s) Oral every 8 hours  aMIOdarone    Tablet 400 milliGRAM(s) Oral three times a day  apixaban 5 milliGRAM(s) Oral every 12 hours  ascorbic acid 500 milliGRAM(s) Oral two times a day  calcium carbonate 1250 mG  + Vitamin D (OsCal 500 + D) 1 Tablet(s) Oral three times a day  celecoxib 200 milliGRAM(s) Oral every 12 hours  ferrous    sulfate 325 milliGRAM(s) Oral daily  folic acid 1 milliGRAM(s) Oral daily  levETIRAcetam 500 milliGRAM(s) Oral two times a day  metoprolol tartrate 75 milliGRAM(s) Oral two times a day  multivitamin 1 Tablet(s) Oral daily  pantoprazole    Tablet 40 milliGRAM(s) Oral before breakfast  polyethylene glycol 3350 17 Gram(s) Oral at bedtime  senna 2 Tablet(s) Oral at bedtime    MEDICATIONS  (PRN):  bisacodyl Suppository 10 milliGRAM(s) Rectal once PRN Constipation  HYDROmorphone  Injectable 0.5 milliGRAM(s) IV Push every 10 minutes PRN Moderate Pain (4 - 6)  HYDROmorphone  Injectable 0.5 milliGRAM(s) SubCutaneous every 4 hours PRN Severe Pain (7 - 10)  ondansetron Injectable 8 milliGRAM(s) IV Push every 8 hours PRN Nausea and/or Vomiting  oxyCODONE    IR 5 milliGRAM(s) Oral every 4 hours PRN Mild Pain (1 - 3)  oxyCODONE    IR 10 milliGRAM(s) Oral every 4 hours PRN Moderate Pain (4 - 6)      ROS:     Detailed ten system ROS was performed and was negative except for history as eluded to above.    no fever  no chills  no nausea  no vomiting  no diarrhea  no constipation  no melena  no hematochezia  no chest pain  no palpitations  no sob at rest  no dyspnea on exertion  no cough  no wheezing  no anorexia  no headache  no dizziness  no syncope  no weakness  no myalgia  no dysuria  no polyuria  no hematuria       Vital Signs Last 24 Hrs  T(C): 36.8 (24 May 2021 08:23), Max: 36.8 (24 May 2021 08:23)  T(F): 98.2 (24 May 2021 08:23), Max: 98.2 (24 May 2021 08:23)  HR: 76 (24 May 2021 08:23) (76 - 120)  BP: 124/72 (24 May 2021 08:23) (109/65 - 124/72)  BP(mean): --  RR: 18 (24 May 2021 08:23) (18 - 18)  SpO2: 99% (24 May 2021 08:23) (98% - 99%)    I&O's Summary    23 May 2021 07:01  -  24 May 2021 07:00  --------------------------------------------------------  IN: 0 mL / OUT: 150 mL / NET: -150 mL        PHYSICAL EXAM:    General:                Comfortable, AAO X 3, in no distress.   HEENT:                  Atraumatic, PERRLA, EOMI, conjunctiva clear.   Neck:                     Supple, no adenopathy, no thyromegaly, no JVD, no bruit.  Chest:                    Clear, B/L symmetric air entry, no tachypnea  Heart:                     S1, S2 tachycardic, + murmur.  Abdomen:              Soft, non-tender, bowel sounds active. No palpable masses.  Extremities:           post op left knee.  Skin:                      Skin color, texture normal. No rashes.  Neurologic:            Grossly nonfocal.       TELEMETRY:  Rate controlled  AF    ECG:  AF    LABS:                        10.4   9.03  )-----------( 138      ( 24 May 2021 06:37 )             31.4     05-24    139  |  107  |  28<H>  ----------------------------<  93  4.6   |  29  |  0.93    Ca    8.6      24 May 2021 06:37      RADIOLOGY & ADDITIONAL STUDIES:

## 2021-05-24 NOTE — PROGRESS NOTE ADULT - PROVIDER SPECIALTY LIST ADULT
Anticoag Management
Cardiology
Orthopedics
Anticoag Management
Anticoag Management
Cardiology
Hospitalist
Orthopedics
Anticoag Management
Hospitalist
Orthopedics

## 2021-05-24 NOTE — PROGRESS NOTE ADULT - NSICDXPILOT_GEN_ALL_CORE
[place]

## 2021-05-24 NOTE — PROGRESS NOTE ADULT - ASSESSMENT
This is a 76 year old male s/p left total knee replacment with atrial fibrillation, [id number] #4 on Eliquis at home with high risk for VTE due to history CVA, surgery, impaired mobility, and BMI. He is low risk for bleeding.    Plan:  ::Continue Eliquis 5mg PO twice daily  ::Daily CBC/BMP  ::Venodynes b/l  ::Enc ambulation per PT eval    Will continue to follow.  Dispo: Rehab today 5/24: [place]

## 2021-05-24 NOTE — PROGRESS NOTE ADULT - ASSESSMENT
1. Elective left TKA - pain control, PT, bowel regimen    2. PAF with RVR in postop period - po amiodarone dose to be decreased on DC + po BBL, pain control   - AC with eliquis    3. Seizure disorder - Keppra 500mg BID    4. DMII - HISS    Medically stable for DC

## 2021-05-24 NOTE — PROGRESS NOTE ADULT - ASSESSMENT
Assessment:  76y Male s/p LEFT Total Knee Arthroplasty POD #4    -Pain control  -VTE ppx: Eliquis  -WBAT/OOB with assistance as needed  -PT  -Ice and elevate  -Encourage incentive spirometry  -Medical management per primary team  -DC planning: home today  -Will discuss with attending and will advise if plan changes.

## 2021-05-24 NOTE — PROGRESS NOTE ADULT - ASSESSMENT
OK for discharge planning from cardiac perspective. Reduce Amio dose to 200 mg daily at discharge (after loading dose completed)  Continue anticoagulation.  PAT  Chronic Atrial fibrillation, rate is better controlled  Moderate AS    Suggest:    OK for discharge planning from cardiac perspective. Reduce Amio dose to 200 mg daily at discharge (after loading dose completed)  Continue anticoagulation.   D/W team

## 2021-05-28 DIAGNOSIS — E11.9 TYPE 2 DIABETES MELLITUS WITHOUT COMPLICATIONS: ICD-10-CM

## 2021-05-28 DIAGNOSIS — I35.0 NONRHEUMATIC AORTIC (VALVE) STENOSIS: ICD-10-CM

## 2021-05-28 DIAGNOSIS — I48.0 PAROXYSMAL ATRIAL FIBRILLATION: ICD-10-CM

## 2021-05-28 DIAGNOSIS — M17.12 UNILATERAL PRIMARY OSTEOARTHRITIS, LEFT KNEE: ICD-10-CM

## 2021-05-28 DIAGNOSIS — Z86.73 PERSONAL HISTORY OF TRANSIENT ISCHEMIC ATTACK (TIA), AND CEREBRAL INFARCTION WITHOUT RESIDUAL DEFICITS: ICD-10-CM

## 2021-05-28 DIAGNOSIS — Z79.01 LONG TERM (CURRENT) USE OF ANTICOAGULANTS: ICD-10-CM

## 2021-05-28 DIAGNOSIS — Z96.651 PRESENCE OF RIGHT ARTIFICIAL KNEE JOINT: ICD-10-CM

## 2021-05-28 DIAGNOSIS — Z87.891 PERSONAL HISTORY OF NICOTINE DEPENDENCE: ICD-10-CM

## 2021-05-28 DIAGNOSIS — I47.1 SUPRAVENTRICULAR TACHYCARDIA: ICD-10-CM

## 2021-05-28 DIAGNOSIS — L40.50 ARTHROPATHIC PSORIASIS, UNSPECIFIED: ICD-10-CM

## 2021-05-28 DIAGNOSIS — G40.909 EPILEPSY, UNSPECIFIED, NOT INTRACTABLE, WITHOUT STATUS EPILEPTICUS: ICD-10-CM

## 2022-05-04 NOTE — ED PROVIDER NOTE - PROGRESS NOTE DETAILS
27.2 spoke with neurology Dr. Drew agrees with plan and will see patient in hospital . Jeremi Martinez M.D., Attending Physician

## 2022-05-05 ENCOUNTER — INPATIENT (INPATIENT)
Facility: HOSPITAL | Age: 77
LOS: 5 days | Discharge: HOME HEALTH | DRG: 177 | End: 2022-05-11
Attending: INTERNAL MEDICINE | Admitting: INTERNAL MEDICINE
Payer: MEDICARE

## 2022-05-05 VITALS
SYSTOLIC BLOOD PRESSURE: 152 MMHG | RESPIRATION RATE: 20 BRPM | HEIGHT: 69 IN | WEIGHT: 190.04 LBS | OXYGEN SATURATION: 100 % | DIASTOLIC BLOOD PRESSURE: 77 MMHG | HEART RATE: 114 BPM | TEMPERATURE: 103 F

## 2022-05-05 DIAGNOSIS — Z98.1 ARTHRODESIS STATUS: Chronic | ICD-10-CM

## 2022-05-05 DIAGNOSIS — Z90.89 ACQUIRED ABSENCE OF OTHER ORGANS: Chronic | ICD-10-CM

## 2022-05-05 DIAGNOSIS — Z98.49 CATARACT EXTRACTION STATUS, UNSPECIFIED EYE: Chronic | ICD-10-CM

## 2022-05-05 DIAGNOSIS — Z96.651 PRESENCE OF RIGHT ARTIFICIAL KNEE JOINT: Chronic | ICD-10-CM

## 2022-05-05 LAB
ALBUMIN SERPL ELPH-MCNC: 3.5 G/DL — SIGNIFICANT CHANGE UP (ref 3.3–5)
ALP SERPL-CCNC: 88 U/L — SIGNIFICANT CHANGE UP (ref 40–120)
ALT FLD-CCNC: 33 U/L — SIGNIFICANT CHANGE UP (ref 12–78)
ANION GAP SERPL CALC-SCNC: 3 MMOL/L — LOW (ref 5–17)
APPEARANCE UR: CLEAR — SIGNIFICANT CHANGE UP
APTT BLD: 31.7 SEC — SIGNIFICANT CHANGE UP (ref 27.5–35.5)
AST SERPL-CCNC: 25 U/L — SIGNIFICANT CHANGE UP (ref 15–37)
BASOPHILS # BLD AUTO: 0.03 K/UL — SIGNIFICANT CHANGE UP (ref 0–0.2)
BASOPHILS NFR BLD AUTO: 0.3 % — SIGNIFICANT CHANGE UP (ref 0–2)
BILIRUB SERPL-MCNC: 0.8 MG/DL — SIGNIFICANT CHANGE UP (ref 0.2–1.2)
BILIRUB UR-MCNC: NEGATIVE — SIGNIFICANT CHANGE UP
BUN SERPL-MCNC: 28 MG/DL — HIGH (ref 7–23)
CALCIUM SERPL-MCNC: 8.7 MG/DL — SIGNIFICANT CHANGE UP (ref 8.5–10.1)
CHLORIDE SERPL-SCNC: 105 MMOL/L — SIGNIFICANT CHANGE UP (ref 96–108)
CO2 SERPL-SCNC: 27 MMOL/L — SIGNIFICANT CHANGE UP (ref 22–31)
COLOR SPEC: YELLOW — SIGNIFICANT CHANGE UP
CREAT SERPL-MCNC: 1.01 MG/DL — SIGNIFICANT CHANGE UP (ref 0.5–1.3)
DIFF PNL FLD: ABNORMAL
EGFR: 76 ML/MIN/1.73M2 — SIGNIFICANT CHANGE UP
EOSINOPHIL # BLD AUTO: 0.06 K/UL — SIGNIFICANT CHANGE UP (ref 0–0.5)
EOSINOPHIL NFR BLD AUTO: 0.5 % — SIGNIFICANT CHANGE UP (ref 0–6)
GLUCOSE SERPL-MCNC: 109 MG/DL — HIGH (ref 70–99)
GLUCOSE UR QL: NEGATIVE — SIGNIFICANT CHANGE UP
HCT VFR BLD CALC: 40.2 % — SIGNIFICANT CHANGE UP (ref 39–50)
HGB BLD-MCNC: 13.2 G/DL — SIGNIFICANT CHANGE UP (ref 13–17)
IMM GRANULOCYTES NFR BLD AUTO: 0.5 % — SIGNIFICANT CHANGE UP (ref 0–1.5)
INR BLD: 1.9 RATIO — HIGH (ref 0.88–1.16)
KETONES UR-MCNC: NEGATIVE — SIGNIFICANT CHANGE UP
LACTATE SERPL-SCNC: 1.1 MMOL/L — SIGNIFICANT CHANGE UP (ref 0.7–2)
LEUKOCYTE ESTERASE UR-ACNC: NEGATIVE — SIGNIFICANT CHANGE UP
LYMPHOCYTES # BLD AUTO: 0.64 K/UL — LOW (ref 1–3.3)
LYMPHOCYTES # BLD AUTO: 5.8 % — LOW (ref 13–44)
MCHC RBC-ENTMCNC: 32.8 GM/DL — SIGNIFICANT CHANGE UP (ref 32–36)
MCHC RBC-ENTMCNC: 33.3 PG — SIGNIFICANT CHANGE UP (ref 27–34)
MCV RBC AUTO: 101.5 FL — HIGH (ref 80–100)
MONOCYTES # BLD AUTO: 0.85 K/UL — SIGNIFICANT CHANGE UP (ref 0–0.9)
MONOCYTES NFR BLD AUTO: 7.7 % — SIGNIFICANT CHANGE UP (ref 2–14)
NEUTROPHILS # BLD AUTO: 9.4 K/UL — HIGH (ref 1.8–7.4)
NEUTROPHILS NFR BLD AUTO: 85.2 % — HIGH (ref 43–77)
NITRITE UR-MCNC: NEGATIVE — SIGNIFICANT CHANGE UP
PH UR: 5 — SIGNIFICANT CHANGE UP (ref 5–8)
PLATELET # BLD AUTO: 144 K/UL — LOW (ref 150–400)
POTASSIUM SERPL-MCNC: 4.5 MMOL/L — SIGNIFICANT CHANGE UP (ref 3.5–5.3)
POTASSIUM SERPL-SCNC: 4.5 MMOL/L — SIGNIFICANT CHANGE UP (ref 3.5–5.3)
PROT SERPL-MCNC: 6.9 GM/DL — SIGNIFICANT CHANGE UP (ref 6–8.3)
PROT UR-MCNC: 15
PROTHROM AB SERPL-ACNC: 22.2 SEC — HIGH (ref 10.5–13.4)
RBC # BLD: 3.96 M/UL — LOW (ref 4.2–5.8)
RBC # FLD: 13.2 % — SIGNIFICANT CHANGE UP (ref 10.3–14.5)
SODIUM SERPL-SCNC: 135 MMOL/L — SIGNIFICANT CHANGE UP (ref 135–145)
SP GR SPEC: 1.01 — SIGNIFICANT CHANGE UP (ref 1.01–1.02)
UROBILINOGEN FLD QL: NEGATIVE — SIGNIFICANT CHANGE UP
WBC # BLD: 11.03 K/UL — HIGH (ref 3.8–10.5)
WBC # FLD AUTO: 11.03 K/UL — HIGH (ref 3.8–10.5)

## 2022-05-05 PROCEDURE — G1004: CPT

## 2022-05-05 PROCEDURE — 99285 EMERGENCY DEPT VISIT HI MDM: CPT | Mod: CS

## 2022-05-05 PROCEDURE — 93010 ELECTROCARDIOGRAM REPORT: CPT

## 2022-05-05 PROCEDURE — 71045 X-RAY EXAM CHEST 1 VIEW: CPT | Mod: 26

## 2022-05-05 PROCEDURE — 70450 CT HEAD/BRAIN W/O DYE: CPT | Mod: 26,ME

## 2022-05-05 RX ORDER — ACETAMINOPHEN 500 MG
650 TABLET ORAL ONCE
Refills: 0 | Status: DISCONTINUED | OUTPATIENT
Start: 2022-05-05 | End: 2022-05-05

## 2022-05-05 RX ORDER — PIPERACILLIN AND TAZOBACTAM 4; .5 G/20ML; G/20ML
3.38 INJECTION, POWDER, LYOPHILIZED, FOR SOLUTION INTRAVENOUS ONCE
Refills: 0 | Status: COMPLETED | OUTPATIENT
Start: 2022-05-05 | End: 2022-05-05

## 2022-05-05 RX ORDER — VANCOMYCIN HCL 1 G
1250 VIAL (EA) INTRAVENOUS ONCE
Refills: 0 | Status: COMPLETED | OUTPATIENT
Start: 2022-05-05 | End: 2022-05-05

## 2022-05-05 RX ORDER — ACETAMINOPHEN 500 MG
1000 TABLET ORAL ONCE
Refills: 0 | Status: COMPLETED | OUTPATIENT
Start: 2022-05-05 | End: 2022-05-05

## 2022-05-05 RX ADMIN — PIPERACILLIN AND TAZOBACTAM 200 GRAM(S): 4; .5 INJECTION, POWDER, LYOPHILIZED, FOR SOLUTION INTRAVENOUS at 22:48

## 2022-05-05 RX ADMIN — Medication 250 MILLIGRAM(S): at 23:02

## 2022-05-05 RX ADMIN — Medication 400 MILLIGRAM(S): at 22:50

## 2022-05-05 NOTE — ED PROVIDER NOTE - PROGRESS NOTE DETAILS
August Pham MD : per wife no seizure however pt fell asleep on couch and was difficult to awaken and then couldn't walk , became worried that he was post ictal. no witnessed seizure activity. + URI symptoms x 2  days.

## 2022-05-05 NOTE — ED PROVIDER NOTE - PHYSICAL EXAMINATION
GEN -ill appearing aox 1    HEAD - NC/AT     EYES - EOMI, no conjunctival pallor, no scleral icterus  ENT -   mucous membranes dry      NECK - Neck supple  PULM - CTA b/l,  symmetric breath sounds  COR -  RRR, S1 S2, no murmurs  ABD - , ND, NT, soft, no guarding, no rebound, no masses    EXTREMS - no edema, no deformity, warm and well perfused    SKIN - no rash or bruising      NEUROLOGIC - left UE tremor, pt clenching however able to overcome. Name band;

## 2022-05-05 NOTE — ED ADULT TRIAGE NOTE - CHIEF COMPLAINT QUOTE
Pt. BIBEMS c/o seizure. Patient is A&OX1 to name and date of birth, poor historian. As per EMS wife denies patient having a seizure. Patient found to have urinated on oneself. Temperature in route to hospital was 102.

## 2022-05-05 NOTE — ED PROVIDER NOTE - NSICDXPASTMEDICALHX_GEN_ALL_CORE_FT
PAST MEDICAL HISTORY:  Atrial fibrillation paroxysmal    Cardiac arrhythmia PAT    Cerebrovascular accident 2011 deficit: balance dizziness and weakness both hands    Psoriatic arthritis     Rib fracture left side  Last week of April 2021    Seizure disorder last seizure 2019

## 2022-05-06 DIAGNOSIS — A41.9 SEPSIS, UNSPECIFIED ORGANISM: ICD-10-CM

## 2022-05-06 PROBLEM — I63.9 CEREBRAL INFARCTION, UNSPECIFIED: Chronic | Status: ACTIVE | Noted: 2017-03-22

## 2022-05-06 PROBLEM — L40.50 ARTHROPATHIC PSORIASIS, UNSPECIFIED: Chronic | Status: ACTIVE | Noted: 2021-05-12

## 2022-05-06 PROBLEM — S22.39XA FRACTURE OF ONE RIB, UNSPECIFIED SIDE, INITIAL ENCOUNTER FOR CLOSED FRACTURE: Chronic | Status: ACTIVE | Noted: 2021-05-12

## 2022-05-06 PROBLEM — G40.909 EPILEPSY, UNSPECIFIED, NOT INTRACTABLE, WITHOUT STATUS EPILEPTICUS: Chronic | Status: ACTIVE | Noted: 2021-05-12

## 2022-05-06 LAB
A1C WITH ESTIMATED AVERAGE GLUCOSE RESULT: 6.1 % — HIGH (ref 4–5.6)
ALBUMIN SERPL ELPH-MCNC: 3.2 G/DL — LOW (ref 3.3–5)
ALBUMIN SERPL ELPH-MCNC: 3.3 G/DL — SIGNIFICANT CHANGE UP (ref 3.3–5)
ALP SERPL-CCNC: 77 U/L — SIGNIFICANT CHANGE UP (ref 40–120)
ALP SERPL-CCNC: 81 U/L — SIGNIFICANT CHANGE UP (ref 40–120)
ALT FLD-CCNC: 32 U/L — SIGNIFICANT CHANGE UP (ref 12–78)
ALT FLD-CCNC: 32 U/L — SIGNIFICANT CHANGE UP (ref 12–78)
AMMONIA BLD-MCNC: 13 UMOL/L — SIGNIFICANT CHANGE UP (ref 11–32)
ANION GAP SERPL CALC-SCNC: 4 MMOL/L — LOW (ref 5–17)
ANION GAP SERPL CALC-SCNC: 8 MMOL/L — SIGNIFICANT CHANGE UP (ref 5–17)
AST SERPL-CCNC: 26 U/L — SIGNIFICANT CHANGE UP (ref 15–37)
AST SERPL-CCNC: 26 U/L — SIGNIFICANT CHANGE UP (ref 15–37)
BASOPHILS # BLD AUTO: 0.02 K/UL — SIGNIFICANT CHANGE UP (ref 0–0.2)
BASOPHILS NFR BLD AUTO: 0.2 % — SIGNIFICANT CHANGE UP (ref 0–2)
BILIRUB SERPL-MCNC: 0.8 MG/DL — SIGNIFICANT CHANGE UP (ref 0.2–1.2)
BILIRUB SERPL-MCNC: 0.9 MG/DL — SIGNIFICANT CHANGE UP (ref 0.2–1.2)
BUN SERPL-MCNC: 15 MG/DL — SIGNIFICANT CHANGE UP (ref 7–23)
BUN SERPL-MCNC: 18 MG/DL — SIGNIFICANT CHANGE UP (ref 7–23)
CALCIUM SERPL-MCNC: 8.7 MG/DL — SIGNIFICANT CHANGE UP (ref 8.5–10.1)
CALCIUM SERPL-MCNC: 8.7 MG/DL — SIGNIFICANT CHANGE UP (ref 8.5–10.1)
CHLORIDE SERPL-SCNC: 105 MMOL/L — SIGNIFICANT CHANGE UP (ref 96–108)
CHLORIDE SERPL-SCNC: 105 MMOL/L — SIGNIFICANT CHANGE UP (ref 96–108)
CK SERPL-CCNC: 130 U/L — SIGNIFICANT CHANGE UP (ref 26–308)
CO2 SERPL-SCNC: 24 MMOL/L — SIGNIFICANT CHANGE UP (ref 22–31)
CO2 SERPL-SCNC: 27 MMOL/L — SIGNIFICANT CHANGE UP (ref 22–31)
CREAT SERPL-MCNC: 0.78 MG/DL — SIGNIFICANT CHANGE UP (ref 0.5–1.3)
CREAT SERPL-MCNC: 0.78 MG/DL — SIGNIFICANT CHANGE UP (ref 0.5–1.3)
CREAT SERPL-MCNC: 0.79 MG/DL — SIGNIFICANT CHANGE UP (ref 0.5–1.3)
D DIMER BLD IA.RAPID-MCNC: <150 NG/ML DDU — SIGNIFICANT CHANGE UP
EGFR: 90 ML/MIN/1.73M2 — SIGNIFICANT CHANGE UP
EGFR: 91 ML/MIN/1.73M2 — SIGNIFICANT CHANGE UP
EGFR: 91 ML/MIN/1.73M2 — SIGNIFICANT CHANGE UP
EOSINOPHIL # BLD AUTO: 0.02 K/UL — SIGNIFICANT CHANGE UP (ref 0–0.5)
EOSINOPHIL NFR BLD AUTO: 0.2 % — SIGNIFICANT CHANGE UP (ref 0–6)
ESTIMATED AVERAGE GLUCOSE: 128 MG/DL — HIGH (ref 68–114)
FERRITIN SERPL-MCNC: 915 NG/ML — HIGH (ref 30–400)
GLUCOSE SERPL-MCNC: 100 MG/DL — HIGH (ref 70–99)
GLUCOSE SERPL-MCNC: 90 MG/DL — SIGNIFICANT CHANGE UP (ref 70–99)
HCT VFR BLD CALC: 38.4 % — LOW (ref 39–50)
HCT VFR BLD CALC: 41.3 % — SIGNIFICANT CHANGE UP (ref 39–50)
HGB BLD-MCNC: 12.9 G/DL — LOW (ref 13–17)
HGB BLD-MCNC: 13.5 G/DL — SIGNIFICANT CHANGE UP (ref 13–17)
IMM GRANULOCYTES NFR BLD AUTO: 0.3 % — SIGNIFICANT CHANGE UP (ref 0–1.5)
INR BLD: 1.67 RATIO — HIGH (ref 0.88–1.16)
LDH SERPL L TO P-CCNC: 378 U/L — HIGH (ref 84–241)
LYMPHOCYTES # BLD AUTO: 0.59 K/UL — LOW (ref 1–3.3)
LYMPHOCYTES # BLD AUTO: 6.8 % — LOW (ref 13–44)
MAGNESIUM SERPL-MCNC: 1.8 MG/DL — SIGNIFICANT CHANGE UP (ref 1.6–2.6)
MCHC RBC-ENTMCNC: 32.7 GM/DL — SIGNIFICANT CHANGE UP (ref 32–36)
MCHC RBC-ENTMCNC: 33.1 PG — SIGNIFICANT CHANGE UP (ref 27–34)
MCHC RBC-ENTMCNC: 33.5 PG — SIGNIFICANT CHANGE UP (ref 27–34)
MCHC RBC-ENTMCNC: 33.6 GM/DL — SIGNIFICANT CHANGE UP (ref 32–36)
MCV RBC AUTO: 101.2 FL — HIGH (ref 80–100)
MCV RBC AUTO: 99.7 FL — SIGNIFICANT CHANGE UP (ref 80–100)
MONOCYTES # BLD AUTO: 0.91 K/UL — HIGH (ref 0–0.9)
MONOCYTES NFR BLD AUTO: 10.5 % — SIGNIFICANT CHANGE UP (ref 2–14)
NEUTROPHILS # BLD AUTO: 7.07 K/UL — SIGNIFICANT CHANGE UP (ref 1.8–7.4)
NEUTROPHILS NFR BLD AUTO: 82 % — HIGH (ref 43–77)
PHOSPHATE SERPL-MCNC: 2.6 MG/DL — SIGNIFICANT CHANGE UP (ref 2.5–4.5)
PLATELET # BLD AUTO: 129 K/UL — LOW (ref 150–400)
PLATELET # BLD AUTO: 142 K/UL — LOW (ref 150–400)
POTASSIUM SERPL-MCNC: 4.3 MMOL/L — SIGNIFICANT CHANGE UP (ref 3.5–5.3)
POTASSIUM SERPL-MCNC: 4.4 MMOL/L — SIGNIFICANT CHANGE UP (ref 3.5–5.3)
POTASSIUM SERPL-SCNC: 4.3 MMOL/L — SIGNIFICANT CHANGE UP (ref 3.5–5.3)
POTASSIUM SERPL-SCNC: 4.4 MMOL/L — SIGNIFICANT CHANGE UP (ref 3.5–5.3)
PROT SERPL-MCNC: 6.6 GM/DL — SIGNIFICANT CHANGE UP (ref 6–8.3)
PROT SERPL-MCNC: 6.7 GM/DL — SIGNIFICANT CHANGE UP (ref 6–8.3)
PROTHROM AB SERPL-ACNC: 19.5 SEC — HIGH (ref 10.5–13.4)
RAPID RVP RESULT: DETECTED
RBC # BLD: 3.85 M/UL — LOW (ref 4.2–5.8)
RBC # BLD: 4.08 M/UL — LOW (ref 4.2–5.8)
RBC # FLD: 13.2 % — SIGNIFICANT CHANGE UP (ref 10.3–14.5)
RBC # FLD: 13.3 % — SIGNIFICANT CHANGE UP (ref 10.3–14.5)
SARS-COV-2 RNA SPEC QL NAA+PROBE: DETECTED
SODIUM SERPL-SCNC: 136 MMOL/L — SIGNIFICANT CHANGE UP (ref 135–145)
SODIUM SERPL-SCNC: 137 MMOL/L — SIGNIFICANT CHANGE UP (ref 135–145)
TROPONIN I, HIGH SENSITIVITY RESULT: 14.84 NG/L — SIGNIFICANT CHANGE UP
TSH SERPL-MCNC: 0.31 UU/ML — LOW (ref 0.34–4.82)
WBC # BLD: 8.64 K/UL — SIGNIFICANT CHANGE UP (ref 3.8–10.5)
WBC # BLD: 9.46 K/UL — SIGNIFICANT CHANGE UP (ref 3.8–10.5)
WBC # FLD AUTO: 8.64 K/UL — SIGNIFICANT CHANGE UP (ref 3.8–10.5)
WBC # FLD AUTO: 9.46 K/UL — SIGNIFICANT CHANGE UP (ref 3.8–10.5)

## 2022-05-06 PROCEDURE — 84145 PROCALCITONIN (PCT): CPT

## 2022-05-06 PROCEDURE — 84484 ASSAY OF TROPONIN QUANT: CPT

## 2022-05-06 PROCEDURE — 85027 COMPLETE CBC AUTOMATED: CPT

## 2022-05-06 PROCEDURE — 82565 ASSAY OF CREATININE: CPT

## 2022-05-06 PROCEDURE — 97116 GAIT TRAINING THERAPY: CPT | Mod: GP

## 2022-05-06 PROCEDURE — 99223 1ST HOSP IP/OBS HIGH 75: CPT

## 2022-05-06 PROCEDURE — 85379 FIBRIN DEGRADATION QUANT: CPT

## 2022-05-06 PROCEDURE — 83036 HEMOGLOBIN GLYCOSYLATED A1C: CPT

## 2022-05-06 PROCEDURE — 36415 COLL VENOUS BLD VENIPUNCTURE: CPT

## 2022-05-06 PROCEDURE — 80076 HEPATIC FUNCTION PANEL: CPT

## 2022-05-06 PROCEDURE — 84480 ASSAY TRIIODOTHYRONINE (T3): CPT

## 2022-05-06 PROCEDURE — 82728 ASSAY OF FERRITIN: CPT

## 2022-05-06 PROCEDURE — 97163 PT EVAL HIGH COMPLEX 45 MIN: CPT | Mod: GP

## 2022-05-06 PROCEDURE — 84436 ASSAY OF TOTAL THYROXINE: CPT

## 2022-05-06 PROCEDURE — 82248 BILIRUBIN DIRECT: CPT

## 2022-05-06 PROCEDURE — 83735 ASSAY OF MAGNESIUM: CPT

## 2022-05-06 PROCEDURE — 85610 PROTHROMBIN TIME: CPT

## 2022-05-06 PROCEDURE — 80053 COMPREHEN METABOLIC PANEL: CPT

## 2022-05-06 PROCEDURE — 84100 ASSAY OF PHOSPHORUS: CPT

## 2022-05-06 PROCEDURE — 82550 ASSAY OF CK (CPK): CPT

## 2022-05-06 PROCEDURE — 82140 ASSAY OF AMMONIA: CPT

## 2022-05-06 PROCEDURE — 84443 ASSAY THYROID STIM HORMONE: CPT

## 2022-05-06 PROCEDURE — 83615 LACTATE (LD) (LDH) ENZYME: CPT

## 2022-05-06 PROCEDURE — 85025 COMPLETE CBC W/AUTO DIFF WBC: CPT

## 2022-05-06 PROCEDURE — 86140 C-REACTIVE PROTEIN: CPT

## 2022-05-06 RX ORDER — REMDESIVIR 5 MG/ML
100 INJECTION INTRAVENOUS EVERY 24 HOURS
Refills: 0 | Status: COMPLETED | OUTPATIENT
Start: 2022-05-07 | End: 2022-05-10

## 2022-05-06 RX ORDER — OXYCODONE HYDROCHLORIDE 5 MG/1
10 TABLET ORAL EVERY 8 HOURS
Refills: 0 | Status: DISCONTINUED | OUTPATIENT
Start: 2022-05-06 | End: 2022-05-11

## 2022-05-06 RX ORDER — GUAIFENESIN/DEXTROMETHORPHAN 600MG-30MG
10 TABLET, EXTENDED RELEASE 12 HR ORAL EVERY 4 HOURS
Refills: 0 | Status: DISCONTINUED | OUTPATIENT
Start: 2022-05-06 | End: 2022-05-11

## 2022-05-06 RX ORDER — APIXABAN 2.5 MG/1
5 TABLET, FILM COATED ORAL EVERY 12 HOURS
Refills: 0 | Status: DISCONTINUED | OUTPATIENT
Start: 2022-05-06 | End: 2022-05-11

## 2022-05-06 RX ORDER — SODIUM CHLORIDE 9 MG/ML
1000 INJECTION INTRAMUSCULAR; INTRAVENOUS; SUBCUTANEOUS ONCE
Refills: 0 | Status: COMPLETED | OUTPATIENT
Start: 2022-05-06 | End: 2022-05-06

## 2022-05-06 RX ORDER — SIMVASTATIN 20 MG/1
1 TABLET, FILM COATED ORAL
Qty: 0 | Refills: 0 | DISCHARGE

## 2022-05-06 RX ORDER — AMIODARONE HYDROCHLORIDE 400 MG/1
100 TABLET ORAL DAILY
Refills: 0 | Status: DISCONTINUED | OUTPATIENT
Start: 2022-05-06 | End: 2022-05-11

## 2022-05-06 RX ORDER — CHLORHEXIDINE GLUCONATE 213 G/1000ML
1 SOLUTION TOPICAL
Refills: 0 | Status: DISCONTINUED | OUTPATIENT
Start: 2022-05-06 | End: 2022-05-11

## 2022-05-06 RX ORDER — ONDANSETRON 8 MG/1
4 TABLET, FILM COATED ORAL EVERY 6 HOURS
Refills: 0 | Status: DISCONTINUED | OUTPATIENT
Start: 2022-05-06 | End: 2022-05-11

## 2022-05-06 RX ORDER — METHOTREXATE 2.5 MG/1
0 TABLET ORAL
Qty: 0 | Refills: 0 | DISCHARGE

## 2022-05-06 RX ORDER — FOLIC ACID 0.8 MG
1 TABLET ORAL DAILY
Refills: 0 | Status: DISCONTINUED | OUTPATIENT
Start: 2022-05-06 | End: 2022-05-11

## 2022-05-06 RX ORDER — METOPROLOL TARTRATE 50 MG
50 TABLET ORAL
Refills: 0 | Status: DISCONTINUED | OUTPATIENT
Start: 2022-05-06 | End: 2022-05-11

## 2022-05-06 RX ORDER — REMDESIVIR 5 MG/ML
INJECTION INTRAVENOUS
Refills: 0 | Status: COMPLETED | OUTPATIENT
Start: 2022-05-06 | End: 2022-05-10

## 2022-05-06 RX ORDER — ATORVASTATIN CALCIUM 80 MG/1
80 TABLET, FILM COATED ORAL AT BEDTIME
Refills: 0 | Status: DISCONTINUED | OUTPATIENT
Start: 2022-05-06 | End: 2022-05-11

## 2022-05-06 RX ORDER — REMDESIVIR 5 MG/ML
200 INJECTION INTRAVENOUS EVERY 24 HOURS
Refills: 0 | Status: COMPLETED | OUTPATIENT
Start: 2022-05-06 | End: 2022-05-06

## 2022-05-06 RX ORDER — ACETAMINOPHEN 500 MG
650 TABLET ORAL EVERY 4 HOURS
Refills: 0 | Status: DISCONTINUED | OUTPATIENT
Start: 2022-05-06 | End: 2022-05-09

## 2022-05-06 RX ORDER — DEXAMETHASONE 0.5 MG/5ML
6 ELIXIR ORAL DAILY
Refills: 0 | Status: DISCONTINUED | OUTPATIENT
Start: 2022-05-06 | End: 2022-05-11

## 2022-05-06 RX ORDER — LEVETIRACETAM 250 MG/1
500 TABLET, FILM COATED ORAL
Refills: 0 | Status: DISCONTINUED | OUTPATIENT
Start: 2022-05-06 | End: 2022-05-11

## 2022-05-06 RX ORDER — ALBUTEROL 90 UG/1
2 AEROSOL, METERED ORAL EVERY 4 HOURS
Refills: 0 | Status: DISCONTINUED | OUTPATIENT
Start: 2022-05-06 | End: 2022-05-11

## 2022-05-06 RX ADMIN — Medication 50 MILLIGRAM(S): at 13:57

## 2022-05-06 RX ADMIN — Medication 50 MILLIGRAM(S): at 05:01

## 2022-05-06 RX ADMIN — ONDANSETRON 4 MILLIGRAM(S): 8 TABLET, FILM COATED ORAL at 12:00

## 2022-05-06 RX ADMIN — CHLORHEXIDINE GLUCONATE 1 APPLICATION(S): 213 SOLUTION TOPICAL at 18:06

## 2022-05-06 RX ADMIN — AMIODARONE HYDROCHLORIDE 100 MILLIGRAM(S): 400 TABLET ORAL at 18:04

## 2022-05-06 RX ADMIN — ATORVASTATIN CALCIUM 80 MILLIGRAM(S): 80 TABLET, FILM COATED ORAL at 23:12

## 2022-05-06 RX ADMIN — REMDESIVIR 580 MILLIGRAM(S): 5 INJECTION INTRAVENOUS at 13:57

## 2022-05-06 RX ADMIN — Medication 50 MILLIGRAM(S): at 23:11

## 2022-05-06 RX ADMIN — Medication 1000 MILLIGRAM(S): at 00:12

## 2022-05-06 RX ADMIN — LEVETIRACETAM 500 MILLIGRAM(S): 250 TABLET, FILM COATED ORAL at 13:57

## 2022-05-06 RX ADMIN — APIXABAN 5 MILLIGRAM(S): 2.5 TABLET, FILM COATED ORAL at 13:58

## 2022-05-06 RX ADMIN — Medication 650 MILLIGRAM(S): at 05:01

## 2022-05-06 RX ADMIN — LEVETIRACETAM 500 MILLIGRAM(S): 250 TABLET, FILM COATED ORAL at 23:12

## 2022-05-06 RX ADMIN — APIXABAN 5 MILLIGRAM(S): 2.5 TABLET, FILM COATED ORAL at 23:12

## 2022-05-06 RX ADMIN — SODIUM CHLORIDE 1000 MILLILITER(S): 9 INJECTION INTRAMUSCULAR; INTRAVENOUS; SUBCUTANEOUS at 00:52

## 2022-05-06 RX ADMIN — Medication 650 MILLIGRAM(S): at 08:59

## 2022-05-06 RX ADMIN — Medication 6 MILLIGRAM(S): at 18:06

## 2022-05-06 RX ADMIN — Medication 1 MILLIGRAM(S): at 13:58

## 2022-05-06 NOTE — ED ADULT NURSE REASSESSMENT NOTE - NS ED NURSE REASSESS COMMENT FT1
patient educated on the risks and benefits of not wearing oxygen, heart monitor and pulse oximeter. 1:1 initiated for safety. patient redirected multiple times, unable to follow directions. MD notified. Safety maintained.

## 2022-05-06 NOTE — CONSULT NOTE ADULT - ASSESSMENT
78 y/o M PMHx significant for Atrial fibrillation on Eliquis, Psoriatic arthritis on Methotrexate, s/p back and knee surgery on chronic pain medication regimen, and seizure disorder who presents vis EMS for further evaluation and management of increased confusion/altered mental status over the past 2 days. HPI obtained from the patient's wife due to his overall AMS. The patient's wife notes that last night "was his worst night." The patient was reportedly confused, had signs of rigors, and was increasingly fatigued. The patient's wife states that "he was unable to get up off the couch" which prompted her to notify EMS. Of note the patient had his booster last week (Moderna). The patient's wife did admit to having had URI-like symptoms since last week although states she visited her family member at a local Rehab center where she tested negative for COVID-19 prior to entry. In the ED the patient was notably altered, initially AAO x 1 (now x2). In the ED the patient met SIRS criteria as he was noted to be febrile => TMax 103.1, Tachycardic 114/min, and Hypoxic 90% on room air COVID-19 (+). CT Head => No intracranial hemorrhage or mass effect. Stable encephalomalacia/gliosis in the right parietotemporal lobe. In the ED the patient was given Acetaminophen 1000mg IVPB x 1, Piperacillin/tazobactam 3.375g IVPB x 1, Vancomycin 1.25g IVPB x 1, and NS x 1L.    1. acute respiratory failure. covid-19 viral syndrome/pneumonia. psoriatic arthritis on immunosuppressive therapy  - imaging reviewed  - agree with decadron 6mg daily  - add remdesivir monitor renal, hepatic function closely on therapy  - observe off antibiotics   - isolation precautions  - fu cbc  - monitor temps  - supportive care    2. other issues - care per medicine

## 2022-05-06 NOTE — PATIENT PROFILE ADULT - FALL HARM RISK - HARM RISK INTERVENTIONS
Assistance OOB with selected safe patient handling equipment/Communicate Risk of Fall with Harm to all staff/Discuss with provider need for PT consult/Monitor for mental status changes/Monitor gait and stability/Provide patient with walking aids - walker, cane, crutches/Reinforce activity limits and safety measures with patient and family/Tailored Fall Risk Interventions/Visual Cue: Yellow wristband and red socks/Bed in lowest position, wheels locked, appropriate side rails in place/Call bell, personal items and telephone in reach/Instruct patient to call for assistance before getting out of bed or chair/Non-slip footwear when patient is out of bed/[place] to call system/Physically safe environment - no spills, clutter or unnecessary equipment/Purposeful Proactive Rounding/Room/bathroom lighting operational, light cord in reach

## 2022-05-06 NOTE — H&P ADULT - NSHPPHYSICALEXAM_GEN_ALL_CORE
Vital Signs Last 24 Hrs  T(C): 36.9 (06 May 2022 01:30), Max: 39.5 (05 May 2022 22:15)  T(F): 98.5 (06 May 2022 01:30), Max: 103.1 (05 May 2022 22:15)  HR: 83 (06 May 2022 01:30) (83 - 114)  BP: 104/73 (06 May 2022 01:30) (104/73 - 165/83)  BP(mean): 101 (06 May 2022 01:00) (82 - 109)  RR: 18 (06 May 2022 01:30) (16 - 20)  SpO2: 95% (06 May 2022 01:30) (90% - 100%)

## 2022-05-06 NOTE — H&P ADULT - MS EXT PE MLT D E PC
Mom called requesting the results of the pt's lab work 
Spoke with mom and made her aware of the pt's gastric emptying results  I also went over the providers instructions  Mom verbalized understanding and had no further questions 
no clubbing/no cyanosis

## 2022-05-06 NOTE — H&P ADULT - HISTORY OF PRESENT ILLNESS
Vital signs in triage => /77, /min, RR 20/min, TMax 0.3'F, SpO2 100% room air. Labs => WBC 11.03, INR 1.90, BUN/Cr 28/1.01, UA (-), COVID-19 (+). CT Head => No intracranial hemorrhage or mass effect. Stable encephalomalacia/gliosis in the right parietotemporal lobe. In the ED the patient was given Acetaminophen 1000mg IVPB x 1, Piperacillin/tazobactam 3.375g IVPB x 1, Vancomycin 1.25g IVPB x 1, and NS x 1L. 78 y/o M PMHx significant for Atrial fibrillation on Eliquis, Psoriatic arthritis on Methotrexate, s/p back and knee surgery on chronic pain medication regimen, and seizure disorder who presents vis EMS for further evaluation and management of increased confusion/altered mental status over the past 2 days. HPI obtained from the patient's wife due to his overall AMS. The patient's wife notes that last night "was his worst night." The patient was reportedly confused, had signs of rigors, and was increasingly fatigued. The patient's wife states that "he was unable to get up off the couch" which prompted her to notify EMS. Of note the patient had his booster last week (Moderna). The patient's wife did admit to having had URI-like symptoms since last week although states she visited her family member at a local Rehab center where she tested negative for COVID-19 prior to entry. In the ED the patient was notably altered, initially AAO x 1 (now x2). In the ED the patient met SIRS criteria as he was noted to be febrile => TMax 103.1, Tachycardic 114/min, and Hypoxic 90% on room air.  Vital signs in triage => /77, /min, RR 20/min, TMax 0.3'F, SpO2 100% room air. Labs => WBC 11.03, INR 1.90, BUN/Cr 28/1.01, UA (-), COVID-19 (+). CT Head => No intracranial hemorrhage or mass effect. Stable encephalomalacia/gliosis in the right parietotemporal lobe. In the ED the patient was given Acetaminophen 1000mg IVPB x 1, Piperacillin/tazobactam 3.375g IVPB x 1, Vancomycin 1.25g IVPB x 1, and NS x 1L.

## 2022-05-06 NOTE — ED ADULT NURSE NOTE - OBJECTIVE STATEMENT
Pt A&Ox2, presents to the ED c/o fatigue. Pt poor historian at this time due to dementia. Per wife, pt was unable to get up off the couch tonight due to weakness. Pt noted to be febrile, 103.1 rectal temp. Pt tachycardic. SpO2 90& on room air, placed on 2L nasal cannula. Pt denies pain. Wife at the bedside.

## 2022-05-06 NOTE — ED ADULT NURSE REASSESSMENT NOTE - NS ED NURSE REASSESS COMMENT FT1
Report received from JANESSA Loving. Patient sleeping in stretcher, on cardiac monitor and oxygen via nasal cannula. No visible signs of distress noted. Safety maintained.

## 2022-05-06 NOTE — ED ADULT NURSE REASSESSMENT NOTE - NS ED NURSE REASSESS COMMENT FT1
received report from olimpia moctezuma, pt vitals stable, awaiting bed on unit, linens changed, 1-1 for safety, breakfast ordered

## 2022-05-06 NOTE — PROGRESS NOTE ADULT - ASSESSMENT
78 y/o M PMHx significant for Atrial fibrillation on Eliquis, Psoriatic arthritis on Methotrexate, s/p back and knee surgery on chronic pain medication regimen, and seizure disorder who presents vis EMS for further evaluation and management of increased confusion/altered mental status over the past 2 days. HPI obtained from the patient's wife due to his overall AMS. The patient's wife notes that last night "was his worst night." The patient was reportedly confused, had signs of rigors, and was increasingly fatigued. The patient's wife states that "he was unable to get up off the couch" which prompted her to notify EMS. Of note the patient had his booster last week (Moderna). The patient's wife did admit to having had URI-like symptoms since last week although states she visited her family member at a local Rehab center where she tested negative for COVID-19 prior to entry. In the ED the patient was notably altered, initially AAO x 1 (now x2). In the ED the patient met SIRS criteria as he was noted to be febrile => TMax 103.1, Tachycardic 114/min, and Hypoxic 90% on room air.  Vital signs in triage => /77, /min, RR 20/min, TMax 0.3'F, SpO2 100% room air. Labs => WBC 11.03, INR 1.90, BUN/Cr 28/1.01, UA (-), COVID-19 (+). CT Head => No intracranial hemorrhage or mass effect. Stable encephalomalacia/gliosis in the right parietotemporal lobe. In the ED the patient was given Acetaminophen 1000mg IVPB x 1, Piperacillin/tazobactam 3.375g IVPB x 1, Vancomycin 1.25g IVPB x 1, and NS x 1L.    #Acute Infectious Encephalopathy due to Acute Hypoxic Respiratory Failure as a consequence of Viral Pneumonia secondary to Novel Coronavirus Infection  ~admit to Medicine  ~maintain on airborne isolation.  ~continue with O2 via nasal cannula and up-titrate as needed. If on non-rebreather mask, start continuous oximetry monitoring.  ~cont. Dexamethasone 6mg po daily   ~cont. Remdesevir  ~f/u w/ ID consultation in the am  ~cont. anti-pyretics prn with Acetaminophen 650 mg PO q4h PRN fever. Limit use of NSAIDs.  ~cont. HFA albuterol Q6 hour PRN via MDI. Would avoid nebulized preparations to limit risk of aerosol formation.  ~f/u Labs as ordered     #Atrial Fibrillation  ~cont. Amiodarone 100mg po daily  ~cont. Metoprolol tartrate 50mg po bid  ~cont. Eliquis 5mg po bid    #Psoriatic Arthritis  ~takes Methotrexate 2.5mg (8 tablets) po q week (Tuesdays)  ~cont. Folic acid 1mg po daily    #Seizure Disorder  ~cont. Levetiracetam 500mg po q12h  ~f/u Levetiracetam levels in the am    #Chronic pain  ~takes Oxycodone 10mg po tid prn    #Goals of Care discussion  ~per patient's wife Tyra Lindsay [facility] [phone] the patient remains FULL CODE.    #Vte ppx  ~cont. Eliquis 5mg po bid

## 2022-05-06 NOTE — CONSULT NOTE ADULT - SUBJECTIVE AND OBJECTIVE BOX
Patient is a 77y old  Male who presents with a chief complaint of "concern for possible shaking movements" (06 May 2022 03:04)    HPI:  76 y/o M PMHx significant for Atrial fibrillation on Eliquis, Psoriatic arthritis on Methotrexate, s/p back and knee surgery on chronic pain medication regimen, and seizure disorder who presents vis EMS for further evaluation and management of increased confusion/altered mental status over the past 2 days. HPI obtained from the patient's wife due to his overall AMS. The patient's wife notes that last night "was his worst night." The patient was reportedly confused, had signs of rigors, and was increasingly fatigued. The patient's wife states that "he was unable to get up off the couch" which prompted her to notify EMS. Of note the patient had his booster last week (Moderna). The patient's wife did admit to having had URI-like symptoms since last week although states she visited her family member at a local Rehab center where she tested negative for COVID-19 prior to entry. In the ED the patient was notably altered, initially AAO x 1 (now x2). In the ED the patient met SIRS criteria as he was noted to be febrile => TMax 103.1, Tachycardic 114/min, and Hypoxic 90% on room air COVID-19 (+). CT Head => No intracranial hemorrhage or mass effect. Stable encephalomalacia/gliosis in the right parietotemporal lobe. In the ED the patient was given Acetaminophen 1000mg IVPB x 1, Piperacillin/tazobactam 3.375g IVPB x 1, Vancomycin 1.25g IVPB x 1, and NS x 1L.        PMH: as above  PSH: as above  Meds: per reconciliation sheet, noted below  MEDICATIONS  (STANDING):  aMIOdarone    Tablet 100 milliGRAM(s) Oral daily  apixaban 5 milliGRAM(s) Oral every 12 hours  atorvastatin 80 milliGRAM(s) Oral at bedtime  dexAMETHasone     Tablet 6 milliGRAM(s) Oral daily  folic acid 1 milliGRAM(s) Oral daily  levETIRAcetam 500 milliGRAM(s) Oral two times a day  metoprolol tartrate 50 milliGRAM(s) Oral two times a day      Allergies    No Known Allergies    Intolerances      Social: no smoking, no alcohol, no illegal drugs; no recent travel, no exposure to TB  FAMILY HISTORY:  FH: stroke (Father)    FH: arthritis (Father)       no history of premature cardiovascular disease in first degree relatives    ROS:+fever, chills, no HA, no dizziness, no sore throat, no blurry vision, no CP, no palpitations, + SOB, +cough, no abdominal pain, no diarrhea, no N/V, no dysuria, no leg pain, no claudication, no rash, no joint aches, no rectal pain or bleeding, no night sweats    All other systems reviewed and are negative    Vital Signs Last 24 Hrs  T(C): 36.9 (06 May 2022 08:16), Max: 39.5 (05 May 2022 22:15)  T(F): 98.4 (06 May 2022 08:16), Max: 103.1 (05 May 2022 22:15)  HR: 80 (06 May 2022 09:47) (80 - 114)  BP: 147/70 (06 May 2022 09:47) (104/73 - 165/83)  BP(mean): 98 (06 May 2022 08:16) (76 - 948)  RR: 18 (06 May 2022 09:47) (14 - 20)  SpO2: 99% (06 May 2022 09:47) (90% - 100%)  Daily Height in cm: 175.26 (05 May 2022 22:10)    Daily     PE:  Constitutional: frail looking  HEENT: NC/AT, EOMI, PERRLA, conjunctivae clear; ears and nose atraumatic; pharynx benign  Neck: supple; thyroid not palpable  Back: no tenderness  Respiratory:  decreased breath sounds  Cardiovascular: S1S2 regular, no murmurs  Abdomen: soft, not tender, not distended, positive BS; liver and spleen WNL  Genitourinary: no suprapubic tenderness  Lymphatic: no LN palpable  Musculoskeletal: no muscle tenderness, no joint swelling or tenderness  Extremities: no pedal edema  Neurological/ Psychiatric:  moving all extremities  Skin: no rashes; no palpable lesions    Labs: all available labs reviewed                        12.9   9.46  )-----------( 129      ( 06 May 2022 07:24 )             38.4     -    137  |  105  |  18  ----------------------------<  100<H>  4.4   |  24  |  0.79    Ca    8.7      06 May 2022 07:24  Phos  2.6     05-  Mg     1.8     -    TPro  6.6  /  Alb  3.3  /  TBili  0.8  /  DBili  x   /  AST  26  /  ALT  32  /  AlkPhos  77  05-06     LIVER FUNCTIONS - ( 06 May 2022 07:24 )  Alb: 3.3 g/dL / Pro: 6.6 gm/dL / ALK PHOS: 77 U/L / ALT: 32 U/L / AST: 26 U/L / GGT: x           Urinalysis Basic - ( 05 May 2022 23:04 )    Color: Yellow / Appearance: Clear / S.015 / pH: x  Gluc: x / Ketone: Negative  / Bili: Negative / Urobili: Negative   Blood: x / Protein: 15 / Nitrite: Negative   Leuk Esterase: Negative / RBC: 0-2 /HPF / WBC 0-2   Sq Epi: x / Non Sq Epi: Negative / Bacteria: Negative          Radiology: all available radiological tests reviewed  < from: CT Head No Cont (22 @ 23:40) >    ACC: [id number] EXAM:  CT BRAIN                          PROCEDURE DATE:  2022          INTERPRETATION:  CLINICAL INFORMATION: Altered mental status    TECHNIQUE: Noncontrast axial CT images of the brain were acquired from   the base of skull to vertex.    COMPARISON: CT head 2018.    FINDINGS: No intracranial hemorrhage is seen. Encephalomalacia/gliosis in   the right parietotemporal lobe. Moderate periventricular and subcortical   white matter hypoattenuation without mass effect is noted, non-specific,   but likely related to chronic small vessel ischemic changes.    Cerebral volume loss is present with proportional prominence of the sulci   and ventricles. No mass effect or midline shift is seen. Basal cisterns   are not effaced.    Mild ethmoid sinus mucosal thickening. The tympanomastoid cavities are   clear. Prior bilateral ocular lens surgery.    No osseous abnormality seen.    IMPRESSION: No intracranial hemorrhage or mass effect. 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    --- End of Report ---      < end of copied text >    Advanced directives addressed: full resuscitation

## 2022-05-06 NOTE — H&P ADULT - ASSESSMENT
Vital signs in triage => /77, /min, RR 20/min, TMax 0.3'F, SpO2 100% room air. Labs => WBC 11.03, INR 1.90, BUN/Cr 28/1.01, UA (-), COVID-19 (+). CT Head => No intracranial hemorrhage or mass effect. Stable encephalomalacia/gliosis in the right parietotemporal lobe. In the ED the patient was given Acetaminophen 1000mg IVPB x 1, Piperacillin/tazobactam 3.375g IVPB x 1, Vancomycin 1.25g IVPB x 1, and NS x 1L. 76 y/o M PMHx significant for Atrial fibrillation on Eliquis, Psoriatic arthritis on Methotrexate, s/p back and knee surgery on chronic pain medication regimen, and seizure disorder who presents vis EMS for further evaluation and management of increased confusion/altered mental status over the past 2 days. HPI obtained from the patient's wife due to his overall AMS. The patient's wife notes that last night "was his worst night." The patient was reportedly confused, had signs of rigors, and was increasingly fatigued. The patient's wife states that "he was unable to get up off the couch" which prompted her to notify EMS. Of note the patient had his booster last week (Moderna). The patient's wife did admit to having had URI-like symptoms since last week although states she visited her family member at a local Rehab center where she tested negative for COVID-19 prior to entry. In the ED the patient was notably altered, initially AAO x 1 (now x2). In the ED the patient met SIRS criteria as he was noted to be febrile => TMax 103.1, Tachycardic 114/min, and Hypoxic 90% on room air.  Vital signs in triage => /77, /min, RR 20/min, TMax 0.3'F, SpO2 100% room air. Labs => WBC 11.03, INR 1.90, BUN/Cr 28/1.01, UA (-), COVID-19 (+). CT Head => No intracranial hemorrhage or mass effect. Stable encephalomalacia/gliosis in the right parietotemporal lobe. In the ED the patient was given Acetaminophen 1000mg IVPB x 1, Piperacillin/tazobactam 3.375g IVPB x 1, Vancomycin 1.25g IVPB x 1, and NS x 1L. 78 y/o M PMHx significant for Atrial fibrillation on Eliquis, Psoriatic arthritis on Methotrexate, s/p back and knee surgery on chronic pain medication regimen, and seizure disorder who presents vis EMS for further evaluation and management of increased confusion/altered mental status over the past 2 days. HPI obtained from the patient's wife due to his overall AMS. The patient's wife notes that last night "was his worst night." The patient was reportedly confused, had signs of rigors, and was increasingly fatigued. The patient's wife states that "he was unable to get up off the couch" which prompted her to notify EMS. Of note the patient had his booster last week (Moderna). The patient's wife did admit to having had URI-like symptoms since last week although states she visited her family member at a local Rehab center where she tested negative for COVID-19 prior to entry. In the ED the patient was notably altered, initially AAO x 1 (now x2). In the ED the patient met SIRS criteria as he was noted to be febrile => TMax 103.1, Tachycardic 114/min, and Hypoxic 90% on room air.  Vital signs in triage => /77, /min, RR 20/min, TMax 0.3'F, SpO2 100% room air. Labs => WBC 11.03, INR 1.90, BUN/Cr 28/1.01, UA (-), COVID-19 (+). CT Head => No intracranial hemorrhage or mass effect. Stable encephalomalacia/gliosis in the right parietotemporal lobe. In the ED the patient was given Acetaminophen 1000mg IVPB x 1, Piperacillin/tazobactam 3.375g IVPB x 1, Vancomycin 1.25g IVPB x 1, and NS x 1L.    #Viral Pneumonia secondary to Novel Coronavirus Infection  ~admit to Medicine  ~maintain on airborne isolation.  ~continue with O2 as needed via nasal cannula and up-titrate as needed. If on non-rebreather mask, start continuous oximetry monitoring.  ~cont. Dexamethasone 6mg po daily   ~cont. Remdesevir  ~f/u w/ ID consultation in the am  ~cont. anti-pyretics prn with Acetaminophen 650 mg PO q4h PRN fever. Limit use of NSAIDs.  ~cont. HFA albuterol Q6 hour PRN via MDI. Would avoid nebulized preparations to limit risk of aerosol formation.  ~f/u Labs as ordered     #Atrial Fibrillation  ~cont. Amiodarone 100mg po daily  ~cont. Metoprolol tartrate 50mg po bid  ~cont. Eliquis 5mg po bid    #Psoriatic Arthritis  ~takes Methotrexate 2.5mg (8 tablets) po q week (Tuesdays)  ~cont. Folic acid 1mg po daily    #Seizure Disorder  ~cont. Levetiracetam 500mg po q12h  ~f/u Levetiracetam levels in the am    #Chronic pain  ~takes Oxycodone 10mg po tid prn    #Goals of Care discussion  ~per patient's wife Tyra Lindsay [facility] [phone] the patient remains FULL CODE.    #Vte ppx  ~cont. Eliquis 5mg po bid 76 y/o M PMHx significant for Atrial fibrillation on Eliquis, Psoriatic arthritis on Methotrexate, s/p back and knee surgery on chronic pain medication regimen, and seizure disorder who presents vis EMS for further evaluation and management of increased confusion/altered mental status over the past 2 days. HPI obtained from the patient's wife due to his overall AMS. The patient's wife notes that last night "was his worst night." The patient was reportedly confused, had signs of rigors, and was increasingly fatigued. The patient's wife states that "he was unable to get up off the couch" which prompted her to notify EMS. Of note the patient had his booster last week (Moderna). The patient's wife did admit to having had URI-like symptoms since last week although states she visited her family member at a local Rehab center where she tested negative for COVID-19 prior to entry. In the ED the patient was notably altered, initially AAO x 1 (now x2). In the ED the patient met SIRS criteria as he was noted to be febrile => TMax 103.1, Tachycardic 114/min, and Hypoxic 90% on room air.  Vital signs in triage => /77, /min, RR 20/min, TMax 0.3'F, SpO2 100% room air. Labs => WBC 11.03, INR 1.90, BUN/Cr 28/1.01, UA (-), COVID-19 (+). CT Head => No intracranial hemorrhage or mass effect. Stable encephalomalacia/gliosis in the right parietotemporal lobe. In the ED the patient was given Acetaminophen 1000mg IVPB x 1, Piperacillin/tazobactam 3.375g IVPB x 1, Vancomycin 1.25g IVPB x 1, and NS x 1L.    #Acute Infectious Encephalopathy due to Acute Hypoxic Respiratory Failure as a consequence of Viral Pneumonia secondary to Novel Coronavirus Infection  ~admit to Medicine  ~maintain on airborne isolation.  ~continue with O2 via nasal cannula and up-titrate as needed. If on non-rebreather mask, start continuous oximetry monitoring.  ~cont. Dexamethasone 6mg po daily   ~cont. Remdesevir  ~f/u w/ ID consultation in the am  ~cont. anti-pyretics prn with Acetaminophen 650 mg PO q4h PRN fever. Limit use of NSAIDs.  ~cont. HFA albuterol Q6 hour PRN via MDI. Would avoid nebulized preparations to limit risk of aerosol formation.  ~f/u Labs as ordered     #Atrial Fibrillation  ~cont. Amiodarone 100mg po daily  ~cont. Metoprolol tartrate 50mg po bid  ~cont. Eliquis 5mg po bid    #Psoriatic Arthritis  ~takes Methotrexate 2.5mg (8 tablets) po q week (Tuesdays)  ~cont. Folic acid 1mg po daily    #Seizure Disorder  ~cont. Levetiracetam 500mg po q12h  ~f/u Levetiracetam levels in the am    #Chronic pain  ~takes Oxycodone 10mg po tid prn    #Goals of Care discussion  ~per patient's wife Tyra Lindsay [facility] [phone] the patient remains FULL CODE.    #Vte ppx  ~cont. Eliquis 5mg po bid

## 2022-05-07 LAB
ALBUMIN SERPL ELPH-MCNC: 3.1 G/DL — LOW (ref 3.3–5)
ALBUMIN SERPL ELPH-MCNC: 3.2 G/DL — LOW (ref 3.3–5)
ALP SERPL-CCNC: 86 U/L — SIGNIFICANT CHANGE UP (ref 40–120)
ALP SERPL-CCNC: 87 U/L — SIGNIFICANT CHANGE UP (ref 40–120)
ALT FLD-CCNC: 35 U/L — SIGNIFICANT CHANGE UP (ref 12–78)
ALT FLD-CCNC: 36 U/L — SIGNIFICANT CHANGE UP (ref 12–78)
ANION GAP SERPL CALC-SCNC: 5 MMOL/L — SIGNIFICANT CHANGE UP (ref 5–17)
AST SERPL-CCNC: 33 U/L — SIGNIFICANT CHANGE UP (ref 15–37)
AST SERPL-CCNC: 35 U/L — SIGNIFICANT CHANGE UP (ref 15–37)
BASOPHILS # BLD AUTO: 0.01 K/UL — SIGNIFICANT CHANGE UP (ref 0–0.2)
BASOPHILS NFR BLD AUTO: 0.1 % — SIGNIFICANT CHANGE UP (ref 0–2)
BILIRUB DIRECT SERPL-MCNC: 0.3 MG/DL — SIGNIFICANT CHANGE UP (ref 0–0.3)
BILIRUB INDIRECT FLD-MCNC: 0.7 MG/DL — SIGNIFICANT CHANGE UP (ref 0.2–1)
BILIRUB SERPL-MCNC: 1 MG/DL — SIGNIFICANT CHANGE UP (ref 0.2–1.2)
BILIRUB SERPL-MCNC: 1 MG/DL — SIGNIFICANT CHANGE UP (ref 0.2–1.2)
BUN SERPL-MCNC: 24 MG/DL — HIGH (ref 7–23)
CALCIUM SERPL-MCNC: 9.1 MG/DL — SIGNIFICANT CHANGE UP (ref 8.5–10.1)
CHLORIDE SERPL-SCNC: 101 MMOL/L — SIGNIFICANT CHANGE UP (ref 96–108)
CO2 SERPL-SCNC: 27 MMOL/L — SIGNIFICANT CHANGE UP (ref 22–31)
CREAT SERPL-MCNC: 0.92 MG/DL — SIGNIFICANT CHANGE UP (ref 0.5–1.3)
CRP SERPL-MCNC: 36 MG/L — HIGH
CULTURE RESULTS: SIGNIFICANT CHANGE UP
EGFR: 85 ML/MIN/1.73M2 — SIGNIFICANT CHANGE UP
EOSINOPHIL # BLD AUTO: 0 K/UL — SIGNIFICANT CHANGE UP (ref 0–0.5)
EOSINOPHIL NFR BLD AUTO: 0 % — SIGNIFICANT CHANGE UP (ref 0–6)
GLUCOSE SERPL-MCNC: 140 MG/DL — HIGH (ref 70–99)
HCT VFR BLD CALC: 45.3 % — SIGNIFICANT CHANGE UP (ref 39–50)
HGB BLD-MCNC: 15.4 G/DL — SIGNIFICANT CHANGE UP (ref 13–17)
IMM GRANULOCYTES NFR BLD AUTO: 0.3 % — SIGNIFICANT CHANGE UP (ref 0–1.5)
INR BLD: 2.51 RATIO — HIGH (ref 0.88–1.16)
LYMPHOCYTES # BLD AUTO: 0.61 K/UL — LOW (ref 1–3.3)
LYMPHOCYTES # BLD AUTO: 6.2 % — LOW (ref 13–44)
MCHC RBC-ENTMCNC: 33.3 PG — SIGNIFICANT CHANGE UP (ref 27–34)
MCHC RBC-ENTMCNC: 34 GM/DL — SIGNIFICANT CHANGE UP (ref 32–36)
MCV RBC AUTO: 97.8 FL — SIGNIFICANT CHANGE UP (ref 80–100)
MONOCYTES # BLD AUTO: 0.57 K/UL — SIGNIFICANT CHANGE UP (ref 0–0.9)
MONOCYTES NFR BLD AUTO: 5.8 % — SIGNIFICANT CHANGE UP (ref 2–14)
NEUTROPHILS # BLD AUTO: 8.62 K/UL — HIGH (ref 1.8–7.4)
NEUTROPHILS NFR BLD AUTO: 87.6 % — HIGH (ref 43–77)
PLATELET # BLD AUTO: 153 K/UL — SIGNIFICANT CHANGE UP (ref 150–400)
POTASSIUM SERPL-MCNC: 4.3 MMOL/L — SIGNIFICANT CHANGE UP (ref 3.5–5.3)
POTASSIUM SERPL-SCNC: 4.3 MMOL/L — SIGNIFICANT CHANGE UP (ref 3.5–5.3)
PROCALCITONIN SERPL-MCNC: 0.1 NG/ML — SIGNIFICANT CHANGE UP (ref 0.02–0.1)
PROT SERPL-MCNC: 6.9 GM/DL — SIGNIFICANT CHANGE UP (ref 6–8.3)
PROT SERPL-MCNC: 6.9 GM/DL — SIGNIFICANT CHANGE UP (ref 6–8.3)
PROTHROM AB SERPL-ACNC: 29.4 SEC — HIGH (ref 10.5–13.4)
RBC # BLD: 4.63 M/UL — SIGNIFICANT CHANGE UP (ref 4.2–5.8)
RBC # FLD: 13.2 % — SIGNIFICANT CHANGE UP (ref 10.3–14.5)
SODIUM SERPL-SCNC: 133 MMOL/L — LOW (ref 135–145)
SPECIMEN SOURCE: SIGNIFICANT CHANGE UP
T3 SERPL-MCNC: 65 NG/DL — LOW (ref 80–200)
T4 AB SER-ACNC: 9.7 UG/DL — SIGNIFICANT CHANGE UP (ref 4.6–12)
WBC # BLD: 9.84 K/UL — SIGNIFICANT CHANGE UP (ref 3.8–10.5)
WBC # FLD AUTO: 9.84 K/UL — SIGNIFICANT CHANGE UP (ref 3.8–10.5)

## 2022-05-07 PROCEDURE — 99232 SBSQ HOSP IP/OBS MODERATE 35: CPT

## 2022-05-07 RX ADMIN — Medication 6 MILLIGRAM(S): at 10:41

## 2022-05-07 RX ADMIN — APIXABAN 5 MILLIGRAM(S): 2.5 TABLET, FILM COATED ORAL at 20:59

## 2022-05-07 RX ADMIN — REMDESIVIR 540 MILLIGRAM(S): 5 INJECTION INTRAVENOUS at 13:29

## 2022-05-07 RX ADMIN — AMIODARONE HYDROCHLORIDE 100 MILLIGRAM(S): 400 TABLET ORAL at 10:42

## 2022-05-07 RX ADMIN — CHLORHEXIDINE GLUCONATE 1 APPLICATION(S): 213 SOLUTION TOPICAL at 06:28

## 2022-05-07 RX ADMIN — APIXABAN 5 MILLIGRAM(S): 2.5 TABLET, FILM COATED ORAL at 10:43

## 2022-05-07 RX ADMIN — Medication 650 MILLIGRAM(S): at 10:43

## 2022-05-07 RX ADMIN — LEVETIRACETAM 500 MILLIGRAM(S): 250 TABLET, FILM COATED ORAL at 10:43

## 2022-05-07 RX ADMIN — LEVETIRACETAM 500 MILLIGRAM(S): 250 TABLET, FILM COATED ORAL at 21:00

## 2022-05-07 RX ADMIN — Medication 1 MILLIGRAM(S): at 10:43

## 2022-05-07 RX ADMIN — ATORVASTATIN CALCIUM 80 MILLIGRAM(S): 80 TABLET, FILM COATED ORAL at 21:00

## 2022-05-07 RX ADMIN — Medication 50 MILLIGRAM(S): at 21:00

## 2022-05-07 RX ADMIN — Medication 650 MILLIGRAM(S): at 11:13

## 2022-05-07 RX ADMIN — Medication 50 MILLIGRAM(S): at 10:45

## 2022-05-07 NOTE — PROGRESS NOTE ADULT - ASSESSMENT
76 y/o M PMHx significant for Atrial fibrillation on Eliquis, Psoriatic arthritis on Methotrexate, s/p back and knee surgery on chronic pain medication regimen, and seizure disorder who presents vis EMS for further evaluation and management of increased confusion/altered mental status over the past 2 days. HPI obtained from the patient's wife due to his overall AMS. The patient's wife notes that last night "was his worst night." The patient was reportedly confused, had signs of rigors, and was increasingly fatigued. The patient's wife states that "he was unable to get up off the couch" which prompted her to notify EMS. Of note the patient had his booster last week (Moderna). The patient's wife did admit to having had URI-like symptoms since last week although states she visited her family member at a local Rehab center where she tested negative for COVID-19 prior to entry. In the ED the patient was notably altered, initially AAO x 1 (now x2). In the ED the patient met SIRS criteria as he was noted to be febrile => TMax 103.1, Tachycardic 114/min, and Hypoxic 90% on room air.  Vital signs in triage => /77, /min, RR 20/min, TMax 0.3'F, SpO2 100% room air. Labs => WBC 11.03, INR 1.90, BUN/Cr 28/1.01, UA (-), COVID-19 (+). CT Head => No intracranial hemorrhage or mass effect. Stable encephalomalacia/gliosis in the right parietotemporal lobe. In the ED the patient was given Acetaminophen 1000mg IVPB x 1, Piperacillin/tazobactam 3.375g IVPB x 1, Vancomycin 1.25g IVPB x 1, and NS x 1L.    #Acute Infectious Encephalopathy due to Acute Hypoxic Respiratory Failure as a consequence of Viral Pneumonia secondary to Novel Coronavirus Infection  ~admit to Medicine  ~maintain on airborne isolation.  ~continue with O2 via nasal cannula and up-titrate as needed. If on non-rebreather mask, start continuous oximetry monitoring.  ~cont. Dexamethasone 6mg po daily   ~cont. Remdesevir  ~ID recs  ~cont. anti-pyretics prn with Acetaminophen 650 mg PO q4h PRN fever. Limit use of NSAIDs.  ~cont. HFA albuterol Q6 hour PRN via MDI. Would avoid nebulized preparations to limit risk of aerosol formation.  ~f/u Labs as ordered     #Atrial Fibrillation  ~cont. Amiodarone 100mg po daily  ~cont. Metoprolol tartrate 50mg po bid  ~cont. Eliquis 5mg po bid    #Psoriatic Arthritis  ~takes Methotrexate 2.5mg (8 tablets) po q week (Tuesdays)  ~cont. Folic acid 1mg po daily    #Seizure Disorder  ~cont. Levetiracetam 500mg po q12h    #Chronic pain  ~takes Oxycodone 10mg po tid prn    #Goals of Care discussion  ~per patient's wife Tyra Lindsay [facility] [phone] (discussed with at bedside) the patient remains FULL CODE.    #Vte ppx  ~cont. Eliquis 5mg po bid

## 2022-05-08 LAB
ALBUMIN SERPL ELPH-MCNC: 3 G/DL — LOW (ref 3.3–5)
ALP SERPL-CCNC: 77 U/L — SIGNIFICANT CHANGE UP (ref 40–120)
ALT FLD-CCNC: 38 U/L — SIGNIFICANT CHANGE UP (ref 12–78)
ANION GAP SERPL CALC-SCNC: 7 MMOL/L — SIGNIFICANT CHANGE UP (ref 5–17)
AST SERPL-CCNC: 35 U/L — SIGNIFICANT CHANGE UP (ref 15–37)
BILIRUB SERPL-MCNC: 0.9 MG/DL — SIGNIFICANT CHANGE UP (ref 0.2–1.2)
BUN SERPL-MCNC: 31 MG/DL — HIGH (ref 7–23)
CALCIUM SERPL-MCNC: 9 MG/DL — SIGNIFICANT CHANGE UP (ref 8.5–10.1)
CHLORIDE SERPL-SCNC: 104 MMOL/L — SIGNIFICANT CHANGE UP (ref 96–108)
CO2 SERPL-SCNC: 25 MMOL/L — SIGNIFICANT CHANGE UP (ref 22–31)
CREAT SERPL-MCNC: 0.94 MG/DL — SIGNIFICANT CHANGE UP (ref 0.5–1.3)
CRP SERPL-MCNC: 36 MG/L — HIGH
D DIMER BLD IA.RAPID-MCNC: 199 NG/ML DDU — SIGNIFICANT CHANGE UP
EGFR: 82 ML/MIN/1.73M2 — SIGNIFICANT CHANGE UP
FERRITIN SERPL-MCNC: 1858 NG/ML — HIGH (ref 30–400)
GLUCOSE SERPL-MCNC: 135 MG/DL — HIGH (ref 70–99)
INR BLD: 2.84 RATIO — HIGH (ref 0.88–1.16)
POTASSIUM SERPL-MCNC: 4.8 MMOL/L — SIGNIFICANT CHANGE UP (ref 3.5–5.3)
POTASSIUM SERPL-SCNC: 4.8 MMOL/L — SIGNIFICANT CHANGE UP (ref 3.5–5.3)
PROT SERPL-MCNC: 6.7 GM/DL — SIGNIFICANT CHANGE UP (ref 6–8.3)
PROTHROM AB SERPL-ACNC: 33.3 SEC — HIGH (ref 10.5–13.4)
SODIUM SERPL-SCNC: 136 MMOL/L — SIGNIFICANT CHANGE UP (ref 135–145)

## 2022-05-08 PROCEDURE — 99232 SBSQ HOSP IP/OBS MODERATE 35: CPT

## 2022-05-08 RX ADMIN — APIXABAN 5 MILLIGRAM(S): 2.5 TABLET, FILM COATED ORAL at 13:49

## 2022-05-08 RX ADMIN — Medication 650 MILLIGRAM(S): at 04:31

## 2022-05-08 RX ADMIN — Medication 1 MILLIGRAM(S): at 10:04

## 2022-05-08 RX ADMIN — Medication 50 MILLIGRAM(S): at 21:33

## 2022-05-08 RX ADMIN — LEVETIRACETAM 500 MILLIGRAM(S): 250 TABLET, FILM COATED ORAL at 10:04

## 2022-05-08 RX ADMIN — Medication 650 MILLIGRAM(S): at 14:20

## 2022-05-08 RX ADMIN — LEVETIRACETAM 500 MILLIGRAM(S): 250 TABLET, FILM COATED ORAL at 21:33

## 2022-05-08 RX ADMIN — Medication 650 MILLIGRAM(S): at 21:02

## 2022-05-08 RX ADMIN — AMIODARONE HYDROCHLORIDE 100 MILLIGRAM(S): 400 TABLET ORAL at 10:04

## 2022-05-08 RX ADMIN — Medication 50 MILLIGRAM(S): at 10:03

## 2022-05-08 RX ADMIN — CHLORHEXIDINE GLUCONATE 1 APPLICATION(S): 213 SOLUTION TOPICAL at 10:02

## 2022-05-08 RX ADMIN — Medication 650 MILLIGRAM(S): at 05:01

## 2022-05-08 RX ADMIN — Medication 650 MILLIGRAM(S): at 13:50

## 2022-05-08 RX ADMIN — ATORVASTATIN CALCIUM 80 MILLIGRAM(S): 80 TABLET, FILM COATED ORAL at 21:37

## 2022-05-08 RX ADMIN — REMDESIVIR 540 MILLIGRAM(S): 5 INJECTION INTRAVENOUS at 13:49

## 2022-05-08 RX ADMIN — APIXABAN 5 MILLIGRAM(S): 2.5 TABLET, FILM COATED ORAL at 21:33

## 2022-05-08 RX ADMIN — Medication 6 MILLIGRAM(S): at 10:03

## 2022-05-08 NOTE — DIETITIAN INITIAL EVALUATION ADULT - PERTINENT LABORATORY DATA
05-08    136  |  104  |  31<H>  ----------------------------<  135<H>  4.8   |  25  |  0.94    Ca    9.0      08 May 2022 07:00    TPro  6.7  /  Alb  3.0<L>  /  TBili  0.9  /  DBili  0.2  /  AST  35  /  ALT  38  /  AlkPhos  77  05-08  A1C with Estimated Average Glucose Result: 6.1 % (05-06-22 @ 07:24)  A1C with Estimated Average Glucose Result: 5.8 % (05-12-21 @ 08:19)

## 2022-05-08 NOTE — DIETITIAN INITIAL EVALUATION ADULT - ENERGY INTAKE
Adequate (%) Pt with good PO intake in house completing % of meals. Per previous admission May 2021 weight remains relatively stable ~190lbs.

## 2022-05-08 NOTE — PROGRESS NOTE ADULT - ASSESSMENT
76 y/o M PMHx significant for Atrial fibrillation on Eliquis, Psoriatic arthritis on Methotrexate, s/p back and knee surgery on chronic pain medication regimen, and seizure disorder who presents vis EMS for further evaluation and management of increased confusion/altered mental status over the past 2 days. HPI obtained from the patient's wife due to his overall AMS. The patient's wife notes that last night "was his worst night." The patient was reportedly confused, had signs of rigors, and was increasingly fatigued. The patient's wife states that "he was unable to get up off the couch" which prompted her to notify EMS. Of note the patient had his booster last week (Moderna). The patient's wife did admit to having had URI-like symptoms since last week although states she visited her family member at a local Rehab center where she tested negative for COVID-19 prior to entry. In the ED the patient was notably altered, initially AAO x 1 (now x2). In the ED the patient met SIRS criteria as he was noted to be febrile => TMax 103.1, Tachycardic 114/min, and Hypoxic 90% on room air.  Vital signs in triage => /77, /min, RR 20/min, TMax 0.3'F, SpO2 100% room air. Labs => WBC 11.03, INR 1.90, BUN/Cr 28/1.01, UA (-), COVID-19 (+). CT Head => No intracranial hemorrhage or mass effect. Stable encephalomalacia/gliosis in the right parietotemporal lobe. In the ED the patient was given Acetaminophen 1000mg IVPB x 1, Piperacillin/tazobactam 3.375g IVPB x 1, Vancomycin 1.25g IVPB x 1, and NS x 1L.    #Acute Infectious Encephalopathy due to Acute Hypoxic Respiratory Failure as a consequence of Viral Pneumonia secondary to Novel Coronavirus Infection  ~admit to Medicine  ~maintain on airborne isolation.  ~continue with O2 via nasal cannula and up-titrate as needed. If on non-rebreather mask, start continuous oximetry monitoring.  ~cont. Dexamethasone 6mg po daily   ~cont. Remdesevir  -tentative last dose for rem/dec 5/10  ~ID recs  ~cont. anti-pyretics prn with Acetaminophen 650 mg PO q4h PRN fever. Limit use of NSAIDs.  ~cont. HFA albuterol Q6 hour PRN via MDI. Would avoid nebulized preparations to limit risk of aerosol formation.  ~f/u Labs as ordered   -PT evaluation for deconditioning/weakness from above.     #Atrial Fibrillation  ~cont. Amiodarone 100mg po daily  ~cont. Metoprolol tartrate 50mg po bid  ~cont. Eliquis 5mg po bid    #Psoriatic Arthritis  ~takes Methotrexate 2.5mg (8 tablets) po q week (Tuesdays)  ~cont. Folic acid 1mg po daily    #Seizure Disorder  ~cont. Levetiracetam 500mg po q12h    #Chronic pain  ~takes Oxycodone 10mg po tid prn    #Goals of Care discussion  ~per patient's wife Tyra Lindsay [facility] [phone] (discussed with at bedside) the patient remains FULL CODE.    #Vte ppx  ~cont. Eliquis 5mg po bid

## 2022-05-08 NOTE — DIETITIAN INITIAL EVALUATION ADULT - PERTINENT MEDS FT
MEDICATIONS  (STANDING):  aMIOdarone    Tablet 100 milliGRAM(s) Oral daily  apixaban 5 milliGRAM(s) Oral every 12 hours  atorvastatin 80 milliGRAM(s) Oral at bedtime  chlorhexidine 4% Liquid 1 Application(s) Topical <User Schedule>  dexAMETHasone     Tablet 6 milliGRAM(s) Oral daily  folic acid 1 milliGRAM(s) Oral daily  levETIRAcetam 500 milliGRAM(s) Oral two times a day  metoprolol tartrate 50 milliGRAM(s) Oral two times a day  remdesivir  IVPB   IV Intermittent   remdesivir  IVPB 100 milliGRAM(s) IV Intermittent every 24 hours    MEDICATIONS  (PRN):  acetaminophen     Tablet .. 650 milliGRAM(s) Oral every 4 hours PRN Temp greater or equal to 38.5C (101.3F)  ALBUTerol    90 MICROgram(s) HFA Inhaler 2 Puff(s) Inhalation every 4 hours PRN Shortness of Breath and/or Wheezing  guaifenesin/dextromethorphan Oral Liquid 10 milliLiter(s) Oral every 4 hours PRN Cough  ondansetron Injectable 4 milliGRAM(s) IV Push every 6 hours PRN Nausea and/or Vomiting  oxyCODONE    IR 10 milliGRAM(s) Oral every 8 hours PRN Severe Pain (7 - 10)

## 2022-05-08 NOTE — DIETITIAN INITIAL EVALUATION ADULT - OTHER INFO
76 y/o M PMHx significant for Atrial fibrillation on Eliquis, Psoriatic arthritis on Methotrexate, s/p back and knee surgery on chronic pain medication regimen, and seizure disorder who presents vis EMS for further evaluation and management of increased confusion/altered mental status over the past 2 days. HPI obtained from the patient's wife due to his overall AMS. The patient's wife notes that last night "was his worst night." The patient was reportedly confused, had signs of rigors, and was increasingly fatigued. The patient's wife states that "he was unable to get up off the couch" which prompted her to notify EMS. Of note the patient had his booster last week (Moderna). The patient's wife did admit to having had URI-like symptoms since last week although states she visited her family member at a local Rehab center where she tested negative for COVID-19 prior to entry. In the ED the patient was notably altered, AAO x1. In the ED the patient met SIRS criteria as he was noted to be febrile, Tachycardic, and Hypoxic on room air.

## 2022-05-09 LAB
ALBUMIN SERPL ELPH-MCNC: 2.8 G/DL — LOW (ref 3.3–5)
ALP SERPL-CCNC: 69 U/L — SIGNIFICANT CHANGE UP (ref 40–120)
ALT FLD-CCNC: 36 U/L — SIGNIFICANT CHANGE UP (ref 12–78)
AST SERPL-CCNC: 30 U/L — SIGNIFICANT CHANGE UP (ref 15–37)
BILIRUB DIRECT SERPL-MCNC: 0.2 MG/DL — SIGNIFICANT CHANGE UP (ref 0–0.3)
BILIRUB INDIRECT FLD-MCNC: 0.6 MG/DL — SIGNIFICANT CHANGE UP (ref 0.2–1)
BILIRUB SERPL-MCNC: 0.8 MG/DL — SIGNIFICANT CHANGE UP (ref 0.2–1.2)
CREAT SERPL-MCNC: 0.94 MG/DL — SIGNIFICANT CHANGE UP (ref 0.5–1.3)
EGFR: 82 ML/MIN/1.73M2 — SIGNIFICANT CHANGE UP
INR BLD: 2.73 RATIO — HIGH (ref 0.88–1.16)
LEVETIRACETAM SERPL-MCNC: 25.8 UG/ML — SIGNIFICANT CHANGE UP (ref 10–40)
PROT SERPL-MCNC: 6.2 GM/DL — SIGNIFICANT CHANGE UP (ref 6–8.3)
PROTHROM AB SERPL-ACNC: 32 SEC — HIGH (ref 10.5–13.4)

## 2022-05-09 PROCEDURE — 99232 SBSQ HOSP IP/OBS MODERATE 35: CPT

## 2022-05-09 RX ORDER — ACETAMINOPHEN 500 MG
650 TABLET ORAL EVERY 6 HOURS
Refills: 0 | Status: DISCONTINUED | OUTPATIENT
Start: 2022-05-09 | End: 2022-05-11

## 2022-05-09 RX ORDER — CALCIUM CARBONATE 500(1250)
2 TABLET ORAL THREE TIMES A DAY
Refills: 0 | Status: DISCONTINUED | OUTPATIENT
Start: 2022-05-09 | End: 2022-05-09

## 2022-05-09 RX ORDER — CALCIUM CARBONATE 500(1250)
2 TABLET ORAL THREE TIMES A DAY
Refills: 0 | Status: DISCONTINUED | OUTPATIENT
Start: 2022-05-09 | End: 2022-05-11

## 2022-05-09 RX ADMIN — LEVETIRACETAM 500 MILLIGRAM(S): 250 TABLET, FILM COATED ORAL at 21:31

## 2022-05-09 RX ADMIN — Medication 50 MILLIGRAM(S): at 10:35

## 2022-05-09 RX ADMIN — Medication 650 MILLIGRAM(S): at 17:15

## 2022-05-09 RX ADMIN — Medication 1 MILLIGRAM(S): at 10:35

## 2022-05-09 RX ADMIN — Medication 650 MILLIGRAM(S): at 23:52

## 2022-05-09 RX ADMIN — LEVETIRACETAM 500 MILLIGRAM(S): 250 TABLET, FILM COATED ORAL at 10:36

## 2022-05-09 RX ADMIN — ATORVASTATIN CALCIUM 80 MILLIGRAM(S): 80 TABLET, FILM COATED ORAL at 21:31

## 2022-05-09 RX ADMIN — Medication 650 MILLIGRAM(S): at 18:15

## 2022-05-09 RX ADMIN — Medication 2 TABLET(S): at 17:14

## 2022-05-09 RX ADMIN — Medication 50 MILLIGRAM(S): at 21:32

## 2022-05-09 RX ADMIN — AMIODARONE HYDROCHLORIDE 100 MILLIGRAM(S): 400 TABLET ORAL at 10:35

## 2022-05-09 RX ADMIN — Medication 650 MILLIGRAM(S): at 06:04

## 2022-05-09 RX ADMIN — APIXABAN 5 MILLIGRAM(S): 2.5 TABLET, FILM COATED ORAL at 21:31

## 2022-05-09 RX ADMIN — REMDESIVIR 540 MILLIGRAM(S): 5 INJECTION INTRAVENOUS at 12:37

## 2022-05-09 RX ADMIN — CHLORHEXIDINE GLUCONATE 1 APPLICATION(S): 213 SOLUTION TOPICAL at 05:35

## 2022-05-09 RX ADMIN — Medication 6 MILLIGRAM(S): at 10:37

## 2022-05-09 RX ADMIN — APIXABAN 5 MILLIGRAM(S): 2.5 TABLET, FILM COATED ORAL at 10:35

## 2022-05-09 NOTE — PHYSICAL THERAPY INITIAL EVALUATION ADULT - ADDITIONAL COMMENTS
Pt reports he lives in private home with 6-10 steps to enter, then 6-8 steps inside. Pt reports he lives with wife, is indep with all ADLs, and indep amb with SC. Info obtained from eval 5/2021.

## 2022-05-09 NOTE — PHYSICAL THERAPY INITIAL EVALUATION ADULT - DISCHARGE DISPOSITION, PT EVAL
LUCIEN Partially impaired: cannot see medication labels or newsprint, but can see obstacles in path, and the surrounding layout; can count fingers at arm's length

## 2022-05-09 NOTE — PHYSICAL THERAPY INITIAL EVALUATION ADULT - PLANNED THERAPY INTERVENTIONS, PT EVAL
Eval, amb, transfers, bed mobility x 15'. Pt left OOB in chair with all observation section equipment/material intact. Nursing informed of pt OOB in chair no chair alarm. Pt instructed to not get up on his own and to utilize CB if he needs to get OOC. Pt with no c/o pain. Will cont to follow pt and increase as tolerated./bed mobility training/gait training/ROM/strengthening/transfer training

## 2022-05-09 NOTE — PHYSICAL THERAPY INITIAL EVALUATION ADULT - PERTINENT HX OF CURRENT PROBLEM, REHAB EVAL
Pt admitted to  secondary to "shaking", and confusion. Pt with a hx of seizures. CT head: neg. INR- 2.73.

## 2022-05-09 NOTE — PROGRESS NOTE ADULT - ASSESSMENT
78 y/o M PMHx significant for Atrial fibrillation on Eliquis, Psoriatic arthritis on Methotrexate, s/p back and knee surgery on chronic pain medication regimen, and seizure disorder who presents vis EMS for further evaluation and management of increased confusion/altered mental status over the past 2 days. HPI obtained from the patient's wife due to his overall AMS. The patient's wife notes that last night "was his worst night." The patient was reportedly confused, had signs of rigors, and was increasingly fatigued. The patient's wife states that "he was unable to get up off the couch" which prompted her to notify EMS. Of note the patient had his booster last week (Moderna). The patient's wife did admit to having had URI-like symptoms since last week although states she visited her family member at a local Rehab center where she tested negative for COVID-19 prior to entry. In the ED the patient was notably altered, initially AAO x 1 (now x2). In the ED the patient met SIRS criteria as he was noted to be febrile => TMax 103.1, Tachycardic 114/min, and Hypoxic 90% on room air.  Vital signs in triage => /77, /min, RR 20/min, TMax 0.3'F, SpO2 100% room air. Labs => WBC 11.03, INR 1.90, BUN/Cr 28/1.01, UA (-), COVID-19 (+). CT Head => No intracranial hemorrhage or mass effect. Stable encephalomalacia/gliosis in the right parietotemporal lobe. In the ED the patient was given Acetaminophen 1000mg IVPB x 1, Piperacillin/tazobactam 3.375g IVPB x 1, Vancomycin 1.25g IVPB x 1, and NS x 1L.    #Acute Infectious Encephalopathy due to Acute Hypoxic Respiratory Failure as a consequence of Viral Pneumonia secondary to Novel Coronavirus Infection  ~admit to Medicine  ~maintain on airborne isolation.  ~continue with O2 via nasal cannula and up-titrate as needed. If on non-rebreather mask, start continuous oximetry monitoring.  ~cont. Dexamethasone 6mg po daily   ~cont. Remdesevir  -tentative last dose for rem/dec 5/10  ~ID recs  ~cont. anti-pyretics prn with Acetaminophen 650 mg PO q4h PRN fever. Limit use of NSAIDs.  ~cont. HFA albuterol Q6 hour PRN via MDI. Would avoid nebulized preparations to limit risk of aerosol formation.  ~f/u Labs as ordered   -PT evaluation for deconditioning/weakness from above.     #Atrial Fibrillation  ~cont. Amiodarone 100mg po daily  ~cont. Metoprolol tartrate 50mg po bid  ~cont. Eliquis 5mg po bid    #Psoriatic Arthritis  ~takes Methotrexate 2.5mg (8 tablets) po q week (Tuesdays)  ~cont. Folic acid 1mg po daily    #Seizure Disorder  ~cont. Levetiracetam 500mg po q12h    #Chronic pain  ~takes Oxycodone 10mg po tid prn    #Goals of Care discussion  ~per patient's wife Tyra Lindsay [facility] [phone] (discussed with at bedside) the patient remains FULL CODE.    #Vte ppx  ~cont. Eliquis 5mg po bid 76 y/o M PMHx significant for Atrial fibrillation on Eliquis, Psoriatic arthritis on Methotrexate, s/p back and knee surgery on chronic pain medication regimen, and seizure disorder who presents vis EMS for further evaluation and management of increased confusion/altered mental status over the past 2 days. HPI obtained from the patient's wife due to his overall AMS. The patient's wife notes that last night "was his worst night." The patient was reportedly confused, had signs of rigors, and was increasingly fatigued. The patient's wife states that "he was unable to get up off the couch" which prompted her to notify EMS. Of note the patient had his booster last week (Moderna). The patient's wife did admit to having had URI-like symptoms since last week although states she visited her family member at a local Rehab center where she tested negative for COVID-19 prior to entry. In the ED the patient was notably altered, initially AAO x 1 (now x2). In the ED the patient met SIRS criteria as he was noted to be febrile => TMax 103.1, Tachycardic 114/min, and Hypoxic 90% on room air.  Vital signs in triage => /77, /min, RR 20/min, TMax 0.3'F, SpO2 100% room air. Labs => WBC 11.03, INR 1.90, BUN/Cr 28/1.01, UA (-), COVID-19 (+). CT Head => No intracranial hemorrhage or mass effect. Stable encephalomalacia/gliosis in the right parietotemporal lobe. In the ED the patient was given Acetaminophen 1000mg IVPB x 1, Piperacillin/tazobactam 3.375g IVPB x 1, Vancomycin 1.25g IVPB x 1, and NS x 1L.    #Acute Infectious/Metabolic Encephalopathy due to Acute Hypoxic Respiratory Failure as a consequence of Viral Pneumonia secondary to Novel Coronavirus Infection  ~admit to Medicine  ~maintain on airborne isolation.  ~continue with O2 via nasal cannula and up-titrate as needed. If on non-rebreather mask, start continuous oximetry monitoring.  ~cont. Dexamethasone 6mg po daily   ~cont. Remdesevir  -tentative last dose for rem/dec 5/10  ~ID recs  ~cont. anti-pyretics prn with Acetaminophen 650 mg PO q4h PRN fever. Limit use of NSAIDs.  ~cont. HFA albuterol Q6 hour PRN via MDI. Would avoid nebulized preparations to limit risk of aerosol formation.  ~f/u Labs as ordered   -PT evaluation for deconditioning/weakness from above.     #Atrial Fibrillation  ~cont. Amiodarone 100mg po daily  ~cont. Metoprolol tartrate 50mg po bid  ~cont. Eliquis 5mg po bid    #Psoriatic Arthritis  ~takes Methotrexate 2.5mg (8 tablets) po q week (Tuesdays)  ~cont. Folic acid 1mg po daily    #Seizure Disorder  ~cont. Levetiracetam 500mg po q12h    #Chronic pain  ~takes Oxycodone 10mg po tid prn    #Goals of Care discussion  ~per patient's wife Tyar Lindsay [phone] (discussed with at bedside) the patient remains FULL CODE.    #Vte ppx  ~cont. Eliquis 5mg po bid    Dispo: Marked improvement in symptoms. Continue PT. Tentative discharge to [facility] in a couple days.

## 2022-05-09 NOTE — PROGRESS NOTE ADULT - ASSESSMENT
76 y/o M PMHx significant for Atrial fibrillation on Eliquis, Psoriatic arthritis on Methotrexate, s/p back and knee surgery on chronic pain medication regimen, and seizure disorder who presents vis EMS for further evaluation and management of increased confusion/altered mental status over the past 2 days. HPI obtained from the patient's wife due to his overall AMS. The patient's wife notes that last night "was his worst night." The patient was reportedly confused, had signs of rigors, and was increasingly fatigued. The patient's wife states that "he was unable to get up off the couch" which prompted her to notify EMS. Of note the patient had his booster last week (Moderna). The patient's wife did admit to having had URI-like symptoms since last week although states she visited her family member at a local Rehab center where she tested negative for COVID-19 prior to entry. In the ED the patient was notably altered, initially AAO x 1 (now x2). In the ED the patient met SIRS criteria as he was noted to be febrile => TMax 103.1, Tachycardic 114/min, and Hypoxic 90% on room air COVID-19 (+). CT Head => No intracranial hemorrhage or mass effect. Stable encephalomalacia/gliosis in the right parietotemporal lobe. In the ED the patient was given Acetaminophen 1000mg IVPB x 1, Piperacillin/tazobactam 3.375g IVPB x 1, Vancomycin 1.25g IVPB x 1, and NS x 1L.    1. acute respiratory failure. covid-19 viral syndrome/pneumonia. psoriatic arthritis on immunosuppressive therapy  - imaging reviewed  - on decadron 6mg daily #4  - on remdesivir #4 monitor renal, hepatic function closely on therapy  - observe off antibiotics   - if not requiring o2, on RA - can consider dc above therapy  - isolation precautions  - fu cbc  - monitor temps  - supportive care    2. other issues - care per medicine

## 2022-05-10 LAB
ALBUMIN SERPL ELPH-MCNC: 3 G/DL — LOW (ref 3.3–5)
ALP SERPL-CCNC: 68 U/L — SIGNIFICANT CHANGE UP (ref 40–120)
ALT FLD-CCNC: 38 U/L — SIGNIFICANT CHANGE UP (ref 12–78)
AST SERPL-CCNC: 27 U/L — SIGNIFICANT CHANGE UP (ref 15–37)
BILIRUB DIRECT SERPL-MCNC: 0.3 MG/DL — SIGNIFICANT CHANGE UP (ref 0–0.3)
BILIRUB INDIRECT FLD-MCNC: 0.8 MG/DL — SIGNIFICANT CHANGE UP (ref 0.2–1)
BILIRUB SERPL-MCNC: 1.1 MG/DL — SIGNIFICANT CHANGE UP (ref 0.2–1.2)
CREAT SERPL-MCNC: 1.09 MG/DL — SIGNIFICANT CHANGE UP (ref 0.5–1.3)
EGFR: 69 ML/MIN/1.73M2 — SIGNIFICANT CHANGE UP
INR BLD: 2.45 RATIO — HIGH (ref 0.88–1.16)
PROT SERPL-MCNC: 6.2 GM/DL — SIGNIFICANT CHANGE UP (ref 6–8.3)
PROTHROM AB SERPL-ACNC: 28.7 SEC — HIGH (ref 10.5–13.4)

## 2022-05-10 PROCEDURE — 99232 SBSQ HOSP IP/OBS MODERATE 35: CPT

## 2022-05-10 RX ADMIN — Medication 50 MILLIGRAM(S): at 22:25

## 2022-05-10 RX ADMIN — CHLORHEXIDINE GLUCONATE 1 APPLICATION(S): 213 SOLUTION TOPICAL at 09:20

## 2022-05-10 RX ADMIN — Medication 650 MILLIGRAM(S): at 22:25

## 2022-05-10 RX ADMIN — Medication 1 MILLIGRAM(S): at 09:22

## 2022-05-10 RX ADMIN — Medication 50 MILLIGRAM(S): at 09:20

## 2022-05-10 RX ADMIN — LEVETIRACETAM 500 MILLIGRAM(S): 250 TABLET, FILM COATED ORAL at 22:26

## 2022-05-10 RX ADMIN — LEVETIRACETAM 500 MILLIGRAM(S): 250 TABLET, FILM COATED ORAL at 09:22

## 2022-05-10 RX ADMIN — REMDESIVIR 540 MILLIGRAM(S): 5 INJECTION INTRAVENOUS at 13:15

## 2022-05-10 RX ADMIN — APIXABAN 5 MILLIGRAM(S): 2.5 TABLET, FILM COATED ORAL at 22:25

## 2022-05-10 RX ADMIN — APIXABAN 5 MILLIGRAM(S): 2.5 TABLET, FILM COATED ORAL at 09:21

## 2022-05-10 RX ADMIN — Medication 650 MILLIGRAM(S): at 06:08

## 2022-05-10 RX ADMIN — AMIODARONE HYDROCHLORIDE 100 MILLIGRAM(S): 400 TABLET ORAL at 09:22

## 2022-05-10 RX ADMIN — Medication 6 MILLIGRAM(S): at 09:20

## 2022-05-10 RX ADMIN — ATORVASTATIN CALCIUM 80 MILLIGRAM(S): 80 TABLET, FILM COATED ORAL at 22:26

## 2022-05-10 RX ADMIN — Medication 650 MILLIGRAM(S): at 01:30

## 2022-05-10 NOTE — PROGRESS NOTE ADULT - ASSESSMENT
76 y/o M PMHx significant for Atrial fibrillation on Eliquis, Psoriatic arthritis on Methotrexate, s/p back and knee surgery on chronic pain medication regimen, and seizure disorder who presents vis EMS for further evaluation and management of increased confusion/altered mental status over the past 2 days. HPI obtained from the patient's wife due to his overall AMS. The patient's wife notes that last night "was his worst night." The patient was reportedly confused, had signs of rigors, and was increasingly fatigued. The patient's wife states that "he was unable to get up off the couch" which prompted her to notify EMS. Of note the patient had his booster last week (Moderna). The patient's wife did admit to having had URI-like symptoms since last week although states she visited her family member at a local Rehab center where she tested negative for COVID-19 prior to entry. In the ED the patient was notably altered, initially AAO x 1 (now x2). In the ED the patient met SIRS criteria as he was noted to be febrile => TMax 103.1, Tachycardic 114/min, and Hypoxic 90% on room air COVID-19 (+). CT Head => No intracranial hemorrhage or mass effect. Stable encephalomalacia/gliosis in the right parietotemporal lobe. In the ED the patient was given Acetaminophen 1000mg IVPB x 1, Piperacillin/tazobactam 3.375g IVPB x 1, Vancomycin 1.25g IVPB x 1, and NS x 1L.    1. acute respiratory failure. covid-19 viral syndrome/pneumonia. psoriatic arthritis on immunosuppressive therapy  - imaging reviewed, improved  - on decadron 6mg daily #5  - on remdesivir #5 monitor renal, hepatic function closely on therapy  - observe off antibiotics   - dc above therapy, dc home   - isolation precautions  - fu cbc  - monitor temps  - supportive care    2. other issues - care per medicine

## 2022-05-10 NOTE — PROGRESS NOTE ADULT - SUBJECTIVE AND OBJECTIVE BOX
CHIEF COMPLAINT: Confusion; Low normal O2; SOB    SUBJECTIVE: Patient reports no complaints. Said he came to hospital because he may have had a seizure like event. AAOx1; Limited ROS/CC    SIGNIFICANT INTERVAL EVENTS/OVERNIGHT EVENTS: None    Review of Systems: Unable to obtain due to AMS    FROM H&P:  "76 y/o M PMHx significant for Atrial fibrillation on Eliquis, Psoriatic arthritis on Methotrexate, s/p back and knee surgery on chronic pain medication regimen, and seizure disorder who presents vis EMS for further evaluation and management of increased confusion/altered mental status over the past 2 days. HPI obtained from the patient's wife due to his overall AMS. The patient's wife notes that last night "was his worst night." The patient was reportedly confused, had signs of rigors, and was increasingly fatigued. The patient's wife states that "he was unable to get up off the couch" which prompted her to notify EMS. Of note the patient had his booster last week (Moderna). The patient's wife did admit to having had URI-like symptoms since last week although states she visited her family member at a local Rehab center where she tested negative for COVID-19 prior to entry. In the ED the patient was notably altered, initially AAO x 1 (now x2). In the ED the patient met SIRS criteria as he was noted to be febrile => TMax 103.1, Tachycardic 114/min, and Hypoxic 90% on room air.  Vital signs in triage => /77, /min, RR 20/min, TMax 0.3'F, SpO2 100% room air. Labs => WBC 11.03, INR 1.90, BUN/Cr 28/1.01, UA (-), COVID-19 (+). CT Head => No intracranial hemorrhage or mass effect. Stable encephalomalacia/gliosis in the right parietotemporal lobe. In the ED the patient was given Acetaminophen 1000mg IVPB x 1, Piperacillin/tazobactam 3.375g IVPB x 1, Vancomycin 1.25g IVPB x 1, and NS x 1L."    PHYSICAL EXAM:    T(C): 37.1 (05-06-22 @ 10:21), Max: 39.5 (05-05-22 @ 22:15)  HR: 89 (05-06-22 @ 10:21) (80 - 114)  BP: 156/84 (05-06-22 @ 10:21) (104/73 - 165/83)  RR: 20 (05-06-22 @ 10:21) (14 - 20)  SpO2: 100% (05-06-22 @ 10:21) (90% - 100%)    General: AAOx1; NAD  Head: AT/NC  ENT: Moist Mucous Membranes; No Injury  Eyes: EOMI; PERRL  Neck: Non-tender; No JVD  CVS: Irregular; Regular Rate; S1&S2, Murmur +; No edema  Respiratory: Decreased basilar breath sounds; Normal Respiratory Effort; Respiratory support with NC  Abdomen/GI: Soft, non-tender, non-distended, no guarding, no rebound, normal bowel sounds  : No bladder distention, No Joya  Extremities: No cyanosis, No clubbing, No edema  MSK: No CVA tenderness, Normal ROM, No injury  Neuro: AAOx1, CNII-XII grossly intact, non-focal  Psych: Unable to evaluate  Skin: Clean, Dry and Intact      LABS:                          13.5   8.64  )-----------( 142      ( 06 May 2022 11:09 )             41.3     05-06    136  |  105  |  15  ----------------------------<  90  4.3   |  27  |  0.78    Ca    8.7      06 May 2022 11:09  Phos  2.6     05-06  Mg     1.8     05-06    TPro  6.7  /  Alb  3.2<L>  /  TBili  0.9  /  DBili  0.3  /  AST  26  /  ALT  32  /  AlkPhos  81  05-06    SARS-CoV-2: Detected (05 May 2022 22:36)    CAPILLARY BLOOD GLUCOSE      Troponin I, High Sensitivity Result: 14.84: Thyroid Stimulating Hormone, Serum: 0.31 uU/mL (05.06.22 @ 11:09)Ammonia, Serum: 13 umol/L (05.06.22 @ 11:09) D-Dimer Assay, Quantitative: <150: A1C with Estimated Average Glucose (05.06.22 @ 07:24)   A1C with Estimated Average Glucose Result: 6.1: Lactate Dehydrogenase, Serum: 378 U/L (05.06.22 @ 05:01) Ferritin, Serum: 915 ng/mL (05.06.22 @ 05:01) Creatine Kinase, Serum: 130 U/L (05.06.22 @ 05:01) Urinalysis (05.05.22 @ 23:04)   pH Urine: 5.0   Glucose Qualitative, Urine: Negative   Blood, Urine: Small   Color: Yellow   Urine Appearance: Clear   Bilirubin: Negative   Ketone - Urine: Negative   Specific Gravity: 1.015   Protein, Urine: 15   Urobilinogen: Negative   Nitrite: Negative   Leukocyte Esterase Concentration: Negative Lactate, Blood: 1.1 mmol/L (05.05.22 @ 22:36) Respiratory Viral Panel with COVID-19 by MAKAYLA (05.05.22 @ 22:36)   Rapid RVP Result: Detected         RADIOLOGY:  < from: CT Head No Cont (05.05.22 @ 23:40) >  IMPRESSION: No intracranial hemorrhage or mass effect. 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    < end of copied text >      EKG:  < from: 12 Lead ECG (05.05.22 @ 22:10) >    Diagnosis Line Atrial fibrillation  Left axis deviation  Abnormal ECG  When compared with ECG of 20-MAY-2021 14:39,    < end of copied text >            I personally reviewed labs, imaging, ekg, orders and vitals.    Discussed case with:  [X]RN  [X]CM/LULÚ  [X]Patient  []Family  [X]Specialist: Infectious Disease        MEDICATIONS  (STANDING):  aMIOdarone    Tablet 100 milliGRAM(s) Oral daily  apixaban 5 milliGRAM(s) Oral every 12 hours  atorvastatin 80 milliGRAM(s) Oral at bedtime  chlorhexidine 4% Liquid 1 Application(s) Topical <User Schedule>  dexAMETHasone     Tablet 6 milliGRAM(s) Oral daily  folic acid 1 milliGRAM(s) Oral daily  levETIRAcetam 500 milliGRAM(s) Oral two times a day  metoprolol tartrate 50 milliGRAM(s) Oral two times a day  remdesivir  IVPB   IV Intermittent     MEDICATIONS  (PRN):  acetaminophen     Tablet .. 650 milliGRAM(s) Oral every 4 hours PRN Temp greater or equal to 38.5C (101.3F)  ALBUTerol    90 MICROgram(s) HFA Inhaler 2 Puff(s) Inhalation every 4 hours PRN Shortness of Breath and/or Wheezing  guaifenesin/dextromethorphan Oral Liquid 10 milliLiter(s) Oral every 4 hours PRN Cough  ondansetron Injectable 4 milliGRAM(s) IV Push every 6 hours PRN Nausea and/or Vomiting  oxyCODONE    IR 10 milliGRAM(s) Oral every 8 hours PRN Severe Pain (7 - 10)    
CHIEF COMPLAINT: Confusion; Low normal O2; SOB    SUBJECTIVE: Patient reports no complaints. Said he came to hospital because he may have had a seizure like event. AAOx2-3; Wife at bedside->reports resolution of patient's ams. More energy reported. SOB improved. Limited ROS/CC    SIGNIFICANT INTERVAL EVENTS/OVERNIGHT EVENTS: None    Review of Systems: 14 point review of systems reviewed and reported as negative unless otherwise stated above.     FROM H&P:  "78 y/o M PMHx significant for Atrial fibrillation on Eliquis, Psoriatic arthritis on Methotrexate, s/p back and knee surgery on chronic pain medication regimen, and seizure disorder who presents vis EMS for further evaluation and management of increased confusion/altered mental status over the past 2 days. HPI obtained from the patient's wife due to his overall AMS. The patient's wife notes that last night "was his worst night." The patient was reportedly confused, had signs of rigors, and was increasingly fatigued. The patient's wife states that "he was unable to get up off the couch" which prompted her to notify EMS. Of note the patient had his booster last week (Moderna). The patient's wife did admit to having had URI-like symptoms since last week although states she visited her family member at a local Rehab center where she tested negative for COVID-19 prior to entry. In the ED the patient was notably altered, initially AAO x 1 (now x2). In the ED the patient met SIRS criteria as he was noted to be febrile => TMax 103.1, Tachycardic 114/min, and Hypoxic 90% on room air.  Vital signs in triage => /77, /min, RR 20/min, TMax 0.3'F, SpO2 100% room air. Labs => WBC 11.03, INR 1.90, BUN/Cr 28/1.01, UA (-), COVID-19 (+). CT Head => No intracranial hemorrhage or mass effect. Stable encephalomalacia/gliosis in the right parietotemporal lobe. In the ED the patient was given Acetaminophen 1000mg IVPB x 1, Piperacillin/tazobactam 3.375g IVPB x 1, Vancomycin 1.25g IVPB x 1, and NS x 1L."    PHYSICAL EXAM:    T(C): 36.3 (05-08-22 @ 10:00), Max: 36.6 (05-07-22 @ 19:46)  HR: 88 (05-08-22 @ 10:00) (88 - 99)  BP: 109/80 (05-08-22 @ 10:00) (109/80 - 124/95)  RR: 20 (05-08-22 @ 10:00) (20 - 20)  SpO2: 100% (05-08-22 @ 10:00) (95% - 100%)    General: AAOx2-3; NAD  Head: AT/NC  ENT: Moist Mucous Membranes; No Injury  Eyes: EOMI; PERRL  Neck: Non-tender; No JVD  CVS: Irregular; Regular Rate; S1&S2, Murmur +; No edema  Respiratory: Decreased basilar breath sounds; Normal Respiratory Effort; Respiratory support with NC  Abdomen/GI: Soft, non-tender, non-distended, no guarding, no rebound, normal bowel sounds  : No bladder distention, No Joya  Extremities: No cyanosis, No clubbing, No edema  MSK: No CVA tenderness, Normal ROM, No injury  Neuro: AAOx2-3, CNII-XII grossly intact, non-focal  Psych: Appropriate; Cooperative.   Skin: Clean, Dry and Intact      LABS:                          15.4   9.84  )-----------( 153      ( 07 May 2022 07:39 )             45.3     05-08    136  |  104  |  31<H>  ----------------------------<  135<H>  4.8   |  25  |  0.94    Ca    9.0      08 May 2022 07:00    TPro  6.7  /  Alb  3.0<L>  /  TBili  0.9  /  DBili  0.2  /  AST  35  /  ALT  38  /  AlkPhos  77  05-08    SARS-CoV-2: Detected (05 May 2022 22:36)    CAPILLARY BLOOD GLUCOSE          Culture - Urine (collected 05 May 2022 23:04)  Source: Clean Catch None  Final Report (07 May 2022 08:58):    <10,000 CFU/mL Normal Urogenital Jessica    Culture - Blood (collected 05 May 2022 22:36)  Source: .Blood None  Preliminary Report (07 May 2022 04:01):    No growth to date.    Culture - Blood (collected 05 May 2022 22:36)  Source: .Blood None  Preliminary Report (07 May 2022 04:01):    No growth to date.                            15.4   9.84  )-----------( 153      ( 07 May 2022 07:39 )             45.3     05-07    133<L>  |  101  |  24<H>  ----------------------------<  140<H>  4.3   |  27  |  0.92    Ca    9.1      07 May 2022 07:39  Phos  2.6     05-06  Mg     1.8     05-06    TPro  6.9  /  Alb  3.1<L>  /  TBili  1.0  /  DBili  0.3  /  AST  35  /  ALT  36  /  AlkPhos  86  05-07    SARS-CoV-2: Detected (05 May 2022 22:36)    CAPILLARY BLOOD GLUCOSE          Culture - Urine (collected 05 May 2022 23:04)  Source: Clean Catch None  Final Report (07 May 2022 08:58):    <10,000 CFU/mL Normal Urogenital Jessica    Culture - Blood (collected 05 May 2022 22:36)  Source: .Blood None  Preliminary Report (07 May 2022 04:01):    No growth to date.    Culture - Blood (collected 05 May 2022 22:36)  Source: .Blood None  Preliminary Report (07 May 2022 04:01):    No growth to date.                            13.5   8.64  )-----------( 142      ( 06 May 2022 11:09 )             41.3     05-06    136  |  105  |  15  ----------------------------<  90  4.3   |  27  |  0.78    Ca    8.7      06 May 2022 11:09  Phos  2.6     05-06  Mg     1.8     05-06    TPro  6.7  /  Alb  3.2<L>  /  TBili  0.9  /  DBili  0.3  /  AST  26  /  ALT  32  /  AlkPhos  81  05-06    SARS-CoV-2: Detected (05 May 2022 22:36)    CAPILLARY BLOOD GLUCOSE      Troponin I, High Sensitivity Result: 14.84: Thyroid Stimulating Hormone, Serum: 0.31 uU/mL (05.06.22 @ 11:09)Ammonia, Serum: 13 umol/L (05.06.22 @ 11:09) D-Dimer Assay, Quantitative: <150: A1C with Estimated Average Glucose (05.06.22 @ 07:24)   A1C with Estimated Average Glucose Result: 6.1: Lactate Dehydrogenase, Serum: 378 U/L (05.06.22 @ 05:01) Ferritin, Serum: 915 ng/mL (05.06.22 @ 05:01) Creatine Kinase, Serum: 130 U/L (05.06.22 @ 05:01) Urinalysis (05.05.22 @ 23:04)   pH Urine: 5.0   Glucose Qualitative, Urine: Negative   Blood, Urine: Small   Color: Yellow   Urine Appearance: Clear   Bilirubin: Negative   Ketone - Urine: Negative   Specific Gravity: 1.015   Protein, Urine: 15   Urobilinogen: Negative   Nitrite: Negative   Leukocyte Esterase Concentration: Negative Lactate, Blood: 1.1 mmol/L (05.05.22 @ 22:36) Respiratory Viral Panel with COVID-19 by MAKAYLA (05.05.22 @ 22:36)   Rapid RVP Result: Detected         RADIOLOGY:  < from: CT Head No Cont (05.05.22 @ 23:40) >  IMPRESSION: No intracranial hemorrhage or mass effect. 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    < end of copied text >      EKG:  < from: 12 Lead ECG (05.05.22 @ 22:10) >    Diagnosis Line Atrial fibrillation  Left axis deviation  Abnormal ECG  When compared with ECG of 20-MAY-2021 14:39,    < end of copied text >            I personally reviewed labs, imaging, ekg, orders and vitals.    Discussed case with:  [X]RN  [X]CM/SW  [X]Patient  []Family  [X]Specialist: Infectious Disease        MEDICATIONS  (STANDING):  aMIOdarone    Tablet 100 milliGRAM(s) Oral daily  apixaban 5 milliGRAM(s) Oral every 12 hours  atorvastatin 80 milliGRAM(s) Oral at bedtime  chlorhexidine 4% Liquid 1 Application(s) Topical <User Schedule>  dexAMETHasone     Tablet 6 milliGRAM(s) Oral daily  folic acid 1 milliGRAM(s) Oral daily  levETIRAcetam 500 milliGRAM(s) Oral two times a day  metoprolol tartrate 50 milliGRAM(s) Oral two times a day  remdesivir  IVPB   IV Intermittent     MEDICATIONS  (PRN):  acetaminophen     Tablet .. 650 milliGRAM(s) Oral every 4 hours PRN Temp greater or equal to 38.5C (101.3F)  ALBUTerol    90 MICROgram(s) HFA Inhaler 2 Puff(s) Inhalation every 4 hours PRN Shortness of Breath and/or Wheezing  guaifenesin/dextromethorphan Oral Liquid 10 milliLiter(s) Oral every 4 hours PRN Cough  ondansetron Injectable 4 milliGRAM(s) IV Push every 6 hours PRN Nausea and/or Vomiting  oxyCODONE    IR 10 milliGRAM(s) Oral every 8 hours PRN Severe Pain (7 - 10)    
CHIEF COMPLAINT: Confusion; Low normal O2; SOB    SUBJECTIVE: Patient reports no complaints.Now AAOx3 and besides from generalized weakness, he has not other complaints.     SIGNIFICANT INTERVAL EVENTS/OVERNIGHT EVENTS: None    Review of Systems: 14 point review of systems reviewed and reported as negative unless otherwise stated above.     FROM H&P:  "76 y/o M PMHx significant for Atrial fibrillation on Eliquis, Psoriatic arthritis on Methotrexate, s/p back and knee surgery on chronic pain medication regimen, and seizure disorder who presents vis EMS for further evaluation and management of increased confusion/altered mental status over the past 2 days. HPI obtained from the patient's wife due to his overall AMS. The patient's wife notes that last night "was his worst night." The patient was reportedly confused, had signs of rigors, and was increasingly fatigued. The patient's wife states that "he was unable to get up off the couch" which prompted her to notify EMS. Of note the patient had his booster last week (Moderna). The patient's wife did admit to having had URI-like symptoms since last week although states she visited her family member at a local Rehab center where she tested negative for COVID-19 prior to entry. In the ED the patient was notably altered, initially AAO x 1 (now x2). In the ED the patient met SIRS criteria as he was noted to be febrile => TMax 103.1, Tachycardic 114/min, and Hypoxic 90% on room air.  Vital signs in triage => /77, /min, RR 20/min, TMax 0.3'F, SpO2 100% room air. Labs => WBC 11.03, INR 1.90, BUN/Cr 28/1.01, UA (-), COVID-19 (+). CT Head => No intracranial hemorrhage or mass effect. Stable encephalomalacia/gliosis in the right parietotemporal lobe. In the ED the patient was given Acetaminophen 1000mg IVPB x 1, Piperacillin/tazobactam 3.375g IVPB x 1, Vancomycin 1.25g IVPB x 1, and NS x 1L."    PHYSICAL EXAM:    T(C): 36.1 (05-09-22 @ 08:51), Max: 36.6 (05-08-22 @ 20:09)  HR: 67 (05-09-22 @ 08:51) (67 - 79)  BP: 112/80 (05-09-22 @ 08:51) (112/80 - 136/76)  RR: 18 (05-09-22 @ 08:51) (18 - 18)  SpO2: 100% (05-09-22 @ 08:51) (98% - 100%)    General: AAOx2-3; NAD  Head: AT/NC  ENT: Moist Mucous Membranes; No Injury  Eyes: EOMI; PERRL  Neck: Non-tender; No JVD  CVS: Irregular; Regular Rate; S1&S2, Murmur +; No edema  Respiratory: Decreased basilar breath sounds; Normal Respiratory Effort; Respiratory support with NC  Abdomen/GI: Soft, non-tender, non-distended, no guarding, no rebound, normal bowel sounds  : No bladder distention, No Joya  Extremities: No cyanosis, No clubbing, No edema  MSK: No CVA tenderness, Normal ROM, No injury  Neuro: AAOx2-3, CNII-XII grossly intact, non-focal  Psych: Appropriate; Cooperative.   Skin: Clean, Dry and Intact      LABS:        05-09    x   |  x   |  x   ----------------------------<  x   x    |  x   |  0.94    Ca    9.0      08 May 2022 07:00    TPro  6.2  /  Alb  2.8<L>  /  TBili  0.8  /  DBili  0.2  /  AST  30  /  ALT  36  /  AlkPhos  69  05-09    SARS-CoV-2: Detected (05 May 2022 22:36)    CAPILLARY BLOOD GLUCOSE                                15.4   9.84  )-----------( 153      ( 07 May 2022 07:39 )             45.3     05-08    136  |  104  |  31<H>  ----------------------------<  135<H>  4.8   |  25  |  0.94    Ca    9.0      08 May 2022 07:00    TPro  6.7  /  Alb  3.0<L>  /  TBili  0.9  /  DBili  0.2  /  AST  35  /  ALT  38  /  AlkPhos  77  05-08    SARS-CoV-2: Detected (05 May 2022 22:36)    CAPILLARY BLOOD GLUCOSE          Culture - Urine (collected 05 May 2022 23:04)  Source: Clean Catch None  Final Report (07 May 2022 08:58):    <10,000 CFU/mL Normal Urogenital Jessica    Culture - Blood (collected 05 May 2022 22:36)  Source: .Blood None  Preliminary Report (07 May 2022 04:01):    No growth to date.    Culture - Blood (collected 05 May 2022 22:36)  Source: .Blood None  Preliminary Report (07 May 2022 04:01):    No growth to date.                            15.4   9.84  )-----------( 153      ( 07 May 2022 07:39 )             45.3     05-07    133<L>  |  101  |  24<H>  ----------------------------<  140<H>  4.3   |  27  |  0.92    Ca    9.1      07 May 2022 07:39  Phos  2.6     05-06  Mg     1.8     05-06    TPro  6.9  /  Alb  3.1<L>  /  TBili  1.0  /  DBili  0.3  /  AST  35  /  ALT  36  /  AlkPhos  86  05-07    SARS-CoV-2: Detected (05 May 2022 22:36)    CAPILLARY BLOOD GLUCOSE          Culture - Urine (collected 05 May 2022 23:04)  Source: Clean Catch None  Final Report (07 May 2022 08:58):    <10,000 CFU/mL Normal Urogenital Jessica    Culture - Blood (collected 05 May 2022 22:36)  Source: .Blood None  Preliminary Report (07 May 2022 04:01):    No growth to date.    Culture - Blood (collected 05 May 2022 22:36)  Source: .Blood None  Preliminary Report (07 May 2022 04:01):    No growth to date.                            13.5   8.64  )-----------( 142      ( 06 May 2022 11:09 )             41.3     05-06    136  |  105  |  15  ----------------------------<  90  4.3   |  27  |  0.78    Ca    8.7      06 May 2022 11:09  Phos  2.6     05-06  Mg     1.8     05-06    TPro  6.7  /  Alb  3.2<L>  /  TBili  0.9  /  DBili  0.3  /  AST  26  /  ALT  32  /  AlkPhos  81  05-06    SARS-CoV-2: Detected (05 May 2022 22:36)    CAPILLARY BLOOD GLUCOSE      Troponin I, High Sensitivity Result: 14.84: Thyroid Stimulating Hormone, Serum: 0.31 uU/mL (05.06.22 @ 11:09)Ammonia, Serum: 13 umol/L (05.06.22 @ 11:09) D-Dimer Assay, Quantitative: <150: A1C with Estimated Average Glucose (05.06.22 @ 07:24)   A1C with Estimated Average Glucose Result: 6.1: Lactate Dehydrogenase, Serum: 378 U/L (05.06.22 @ 05:01) Ferritin, Serum: 915 ng/mL (05.06.22 @ 05:01) Creatine Kinase, Serum: 130 U/L (05.06.22 @ 05:01) Urinalysis (05.05.22 @ 23:04)   pH Urine: 5.0   Glucose Qualitative, Urine: Negative   Blood, Urine: Small   Color: Yellow   Urine Appearance: Clear   Bilirubin: Negative   Ketone - Urine: Negative   Specific Gravity: 1.015   Protein, Urine: 15   Urobilinogen: Negative   Nitrite: Negative   Leukocyte Esterase Concentration: Negative Lactate, Blood: 1.1 mmol/L (05.05.22 @ 22:36) Respiratory Viral Panel with COVID-19 by MAKAYLA (05.05.22 @ 22:36)   Rapid RVP Result: Detected         RADIOLOGY:  < from: CT Head No Cont (05.05.22 @ 23:40) >  IMPRESSION: No intracranial hemorrhage or mass effect. 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    < end of copied text >      EKG:  < from: 12 Lead ECG (05.05.22 @ 22:10) >    Diagnosis Line Atrial fibrillation  Left axis deviation  Abnormal ECG  When compared with ECG of 20-MAY-2021 14:39,    < end of copied text >            I personally reviewed labs, imaging, ekg, orders and vitals.    Discussed case with:  [X]RN  [X]CM/SW  [X]Patient  []Family  [X]Specialist: Infectious Disease        MEDICATIONS  (STANDING):  aMIOdarone    Tablet 100 milliGRAM(s) Oral daily  apixaban 5 milliGRAM(s) Oral every 12 hours  atorvastatin 80 milliGRAM(s) Oral at bedtime  chlorhexidine 4% Liquid 1 Application(s) Topical <User Schedule>  dexAMETHasone     Tablet 6 milliGRAM(s) Oral daily  folic acid 1 milliGRAM(s) Oral daily  levETIRAcetam 500 milliGRAM(s) Oral two times a day  metoprolol tartrate 50 milliGRAM(s) Oral two times a day  remdesivir  IVPB   IV Intermittent     MEDICATIONS  (PRN):  acetaminophen     Tablet .. 650 milliGRAM(s) Oral every 4 hours PRN Temp greater or equal to 38.5C (101.3F)  ALBUTerol    90 MICROgram(s) HFA Inhaler 2 Puff(s) Inhalation every 4 hours PRN Shortness of Breath and/or Wheezing  guaifenesin/dextromethorphan Oral Liquid 10 milliLiter(s) Oral every 4 hours PRN Cough  ondansetron Injectable 4 milliGRAM(s) IV Push every 6 hours PRN Nausea and/or Vomiting  oxyCODONE    IR 10 milliGRAM(s) Oral every 8 hours PRN Severe Pain (7 - 10)    
Date of service: 22 @ 10:53    pt seen and examined  not requiring o2  feels much better  denies cough  wants to go home      ROS: no fever or chills; denies dizziness, no HA, no abdominal pain, no diarrhea or constipation; no dysuria, no urinary frequency, no legs pain, no rashes    MEDICATIONS  (STANDING):  aMIOdarone    Tablet 100 milliGRAM(s) Oral daily  apixaban 5 milliGRAM(s) Oral every 12 hours  atorvastatin 80 milliGRAM(s) Oral at bedtime  chlorhexidine 4% Liquid 1 Application(s) Topical <User Schedule>  dexAMETHasone     Tablet 6 milliGRAM(s) Oral daily  folic acid 1 milliGRAM(s) Oral daily  levETIRAcetam 500 milliGRAM(s) Oral two times a day  metoprolol tartrate 50 milliGRAM(s) Oral two times a day  remdesivir  IVPB   IV Intermittent   remdesivir  IVPB 100 milliGRAM(s) IV Intermittent every 24 hours    Vital Signs Last 24 Hrs  T(C): 36.1 (09 May 2022 08:51), Max: 36.6 (08 May 2022 20:09)  T(F): 97 (09 May 2022 08:51), Max: 97.9 (08 May 2022 20:09)  HR: 67 (09 May 2022 08:51) (67 - 79)  BP: 112/80 (09 May 2022 08:51) (112/80 - 136/76)  BP(mean): 91 (08 May 2022 20:09) (91 - 91)  RR: 18 (09 May 2022 08:51) (18 - 18)  SpO2: 100% (09 May 2022 08:51) (98% - 100%)    PE:  Constitutional: frail looking  HEENT: NC/AT, EOMI, PERRLA, conjunctivae clear; ears and nose atraumatic; pharynx benign  Neck: supple; thyroid not palpable  Back: no tenderness  Respiratory:  decreased breath sounds  Cardiovascular: S1S2 regular, no murmurs  Abdomen: soft, not tender, not distended, positive BS; liver and spleen WNL  Genitourinary: no suprapubic tenderness  Lymphatic: no LN palpable  Musculoskeletal: no muscle tenderness, no joint swelling or tenderness  Extremities: no pedal edema  Neurological/ Psychiatric:  moving all extremities  Skin: no rashes; no palpable lesions    Labs: all available labs reviewed                   x   |  x   |  x   ----------------------------<  x   x    |  x   |  0.94      TPro  6.2  /  Alb  2.8<L>  /  TBili  0.8  /  DBili  0.2  /  AST  30  /  ALT  36  /  AlkPhos  69            LIVER FUNCTIONS - ( 06 May 2022 07:24 )  Alb: 3.3 g/dL / Pro: 6.6 gm/dL / ALK PHOS: 77 U/L / ALT: 32 U/L / AST: 26 U/L / GGT: x           Urinalysis Basic - ( 05 May 2022 23:04 )    Color: Yellow / Appearance: Clear / S.015 / pH: x  Gluc: x / Ketone: Negative  / Bili: Negative / Urobili: Negative   Blood: x / Protein: 15 / Nitrite: Negative   Leuk Esterase: Negative / RBC: 0-2 /HPF / WBC 0-2   Sq Epi: x / Non Sq Epi: Negative / Bacteria: Negative          Radiology: all available radiological tests reviewed  < from: CT Head No Cont (22 @ 23:40) >    ACC: [id number] EXAM:  CT BRAIN                          PROCEDURE DATE:  2022          INTERPRETATION:  CLINICAL INFORMATION: Altered mental status    TECHNIQUE: Noncontrast axial CT images of the brain were acquired from   the base of skull to vertex.    COMPARISON: CT head 2018.    FINDINGS: No intracranial hemorrhage is seen. Encephalomalacia/gliosis in   the right parietotemporal lobe. Moderate periventricular and subcortical   white matter hypoattenuation without mass effect is noted, non-specific,   but likely related to chronic small vessel ischemic changes.    Cerebral volume loss is present with proportional prominence of the sulci   and ventricles. No mass effect or midline shift is seen. Basal cisterns   are not effaced.    Mild ethmoid sinus mucosal thickening. The tympanomastoid cavities are   clear. Prior bilateral ocular lens surgery.    No osseous abnormality seen.    IMPRESSION: No intracranial hemorrhage or mass effect. 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    --- End of Report ---      < end of copied text >    Advanced directives addressed: full resuscitation
FROM H&P:  "76 y/o M PMHx significant for Atrial fibrillation on Eliquis, Psoriatic arthritis on Methotrexate, s/p back and knee surgery on chronic pain medication regimen, and seizure disorder who presents vis EMS for further evaluation and management of increased confusion/altered mental status over the past 2 days. HPI obtained from the patient's wife due to his overall AMS. The patient's wife notes that last night "was his worst night." The patient was reportedly confused, had signs of rigors, and was increasingly fatigued. The patient's wife states that "he was unable to get up off the couch" which prompted her to notify EMS. Of note the patient had his booster last week (Moderna). The patient's wife did admit to having had URI-like symptoms since last week although states she visited her family member at a local Rehab center where she tested negative for COVID-19 prior to entry. In the ED the patient was notably altered, initially AAO x 1 (now x2). In the ED the patient met SIRS criteria as he was noted to be febrile => TMax 103.1, Tachycardic 114/min, and Hypoxic 90% on room air.  Vital signs in triage => /77, /min, RR 20/min, TMax 0.3'F, SpO2 100% room air. Labs => WBC 11.03, INR 1.90, BUN/Cr 28/1.01, UA (-), COVID-19 (+). CT Head => No intracranial hemorrhage or mass effect. Stable encephalomalacia/gliosis in the right parietotemporal lobe. In the ED the patient was given Acetaminophen 1000mg IVPB x 1, Piperacillin/tazobactam 3.375g IVPB x 1, Vancomycin 1.25g IVPB x 1, and NS x 1L."      5/10 -seen earlier in day:  no cp palps sob at rest, is eating his lunch     Vital Signs Last 24 Hrs  T(F): 97.6 (10 May 2022 15:31), Max: 98.3 (10 May 2022 08:24)  HR: 77 (10 May 2022 15:31) (69 - 80)  BP: 121/86 (10 May 2022 15:31) (121/86 - 139/80)  RR: 18 (10 May 2022 15:31) (17 - 18)  SpO2: 97% (10 May 2022 15:31) (96% - 97%)  General: AAOx2-3; NAD  Head: AT/NC  ENT: Moist Mucous Membranes; No Injury  Eyes: EOMI; PERRL  Neck: Non-tender; No JVD  CVS: Irregular; Regular Rate; S1&S2, Murmur +; No edema  Respiratory: Decreased basilar breath sounds; Normal Respiratory Effort; Respiratory support with NC  Abdomen/GI: Soft, non-tender, non-distended, no guarding, no rebound, normal bowel sounds  : No bladder distention, No Joya  Extremities: No cyanosis, No clubbing, No edema  MSK: No CVA tenderness, Normal ROM, No injury  Neuro: AAOx2-3, CNII-XII grossly intact, non-focal  Psych: Appropriate; Cooperative.   Skin: Clean, Dry and Intact    RADIOLOGY:  < from: CT Head No Cont (05.05.22 @ 23:40) >  IMPRESSION: No intracranial hemorrhage or mass effect. 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    EKG:  < from: 12 Lead ECG (05.05.22 @ 22:10) >  Diagnosis Line Atrial fibrillation  Left axis deviation  Abnormal ECG  When compared with ECG of 20-MAY-2021 14:39,    Culture - Urine (collected 05 May 2022 23:04)  Source: Clean Catch None  Final Report (07 May 2022 08:58):    <10,000 CFU/mL Normal Urogenital Jessica    LABS: All Labs Reviewed:  05-10  x   |  x   |  x   ----------------------------<  x   x    |  x   |  1.09  TPro  6.2  /  Alb  3.0<L>  /  TBili  1.1  /  DBili  0.3  /  AST  27  /  ALT  38  /  AlkPhos  68  05-10      MEDS:   acetaminophen     Tablet .. 650 milliGRAM(s) Oral every 6 hours PRN  ALBUTerol    90 MICROgram(s) HFA Inhaler 2 Puff(s) Inhalation every 4 hours PRN  aMIOdarone    Tablet 100 milliGRAM(s) Oral daily  apixaban 5 milliGRAM(s) Oral every 12 hours  atorvastatin 80 milliGRAM(s) Oral at bedtime  calcium carbonate    500 mG (Tums) Chewable 2 Tablet(s) Chew three times a day PRN  chlorhexidine 4% Liquid 1 Application(s) Topical <User Schedule>  dexAMETHasone     Tablet 6 milliGRAM(s) Oral daily  folic acid 1 milliGRAM(s) Oral daily  guaifenesin/dextromethorphan Oral Liquid 10 milliLiter(s) Oral every 4 hours PRN  levETIRAcetam 500 milliGRAM(s) Oral two times a day  metoprolol tartrate 50 milliGRAM(s) Oral two times a day  ondansetron Injectable 4 milliGRAM(s) IV Push every 6 hours PRN  oxyCODONE    IR 10 milliGRAM(s) Oral every 8 hours PRN                    
CHIEF COMPLAINT: Confusion; Low normal O2; SOB    SUBJECTIVE: Patient reports no complaints. Said he came to hospital because he may have had a seizure like event. AAOx2-3; Wife at bedside->reports resolution of patient's ams. More energy reported. SOB improved. Limited ROS/CC    SIGNIFICANT INTERVAL EVENTS/OVERNIGHT EVENTS: None    Review of Systems: 14 point review of systems reviewed and reported as negative unless otherwise stated above.     FROM H&P:  "76 y/o M PMHx significant for Atrial fibrillation on Eliquis, Psoriatic arthritis on Methotrexate, s/p back and knee surgery on chronic pain medication regimen, and seizure disorder who presents vis EMS for further evaluation and management of increased confusion/altered mental status over the past 2 days. HPI obtained from the patient's wife due to his overall AMS. The patient's wife notes that last night "was his worst night." The patient was reportedly confused, had signs of rigors, and was increasingly fatigued. The patient's wife states that "he was unable to get up off the couch" which prompted her to notify EMS. Of note the patient had his booster last week (Moderna). The patient's wife did admit to having had URI-like symptoms since last week although states she visited her family member at a local Rehab center where she tested negative for COVID-19 prior to entry. In the ED the patient was notably altered, initially AAO x 1 (now x2). In the ED the patient met SIRS criteria as he was noted to be febrile => TMax 103.1, Tachycardic 114/min, and Hypoxic 90% on room air.  Vital signs in triage => /77, /min, RR 20/min, TMax 0.3'F, SpO2 100% room air. Labs => WBC 11.03, INR 1.90, BUN/Cr 28/1.01, UA (-), COVID-19 (+). CT Head => No intracranial hemorrhage or mass effect. Stable encephalomalacia/gliosis in the right parietotemporal lobe. In the ED the patient was given Acetaminophen 1000mg IVPB x 1, Piperacillin/tazobactam 3.375g IVPB x 1, Vancomycin 1.25g IVPB x 1, and NS x 1L."    PHYSICAL EXAM:    T(C): 37.1 (05-06-22 @ 10:21), Max: 39.5 (05-05-22 @ 22:15)  HR: 89 (05-06-22 @ 10:21) (80 - 114)  BP: 156/84 (05-06-22 @ 10:21) (104/73 - 165/83)  RR: 20 (05-06-22 @ 10:21) (14 - 20)  SpO2: 100% (05-06-22 @ 10:21) (90% - 100%)    General: AAOx2-3; NAD  Head: AT/NC  ENT: Moist Mucous Membranes; No Injury  Eyes: EOMI; PERRL  Neck: Non-tender; No JVD  CVS: Irregular; Regular Rate; S1&S2, Murmur +; No edema  Respiratory: Decreased basilar breath sounds; Normal Respiratory Effort; Respiratory support with NC  Abdomen/GI: Soft, non-tender, non-distended, no guarding, no rebound, normal bowel sounds  : No bladder distention, No Joya  Extremities: No cyanosis, No clubbing, No edema  MSK: No CVA tenderness, Normal ROM, No injury  Neuro: AAOx2-3, CNII-XII grossly intact, non-focal  Psych: Appropriate; Cooperative.   Skin: Clean, Dry and Intact      LABS:                          15.4   9.84  )-----------( 153      ( 07 May 2022 07:39 )             45.3     05-07    133<L>  |  101  |  24<H>  ----------------------------<  140<H>  4.3   |  27  |  0.92    Ca    9.1      07 May 2022 07:39  Phos  2.6     05-06  Mg     1.8     05-06    TPro  6.9  /  Alb  3.1<L>  /  TBili  1.0  /  DBili  0.3  /  AST  35  /  ALT  36  /  AlkPhos  86  05-07    SARS-CoV-2: Detected (05 May 2022 22:36)    CAPILLARY BLOOD GLUCOSE          Culture - Urine (collected 05 May 2022 23:04)  Source: Clean Catch None  Final Report (07 May 2022 08:58):    <10,000 CFU/mL Normal Urogenital Jessica    Culture - Blood (collected 05 May 2022 22:36)  Source: .Blood None  Preliminary Report (07 May 2022 04:01):    No growth to date.    Culture - Blood (collected 05 May 2022 22:36)  Source: .Blood None  Preliminary Report (07 May 2022 04:01):    No growth to date.                            13.5   8.64  )-----------( 142      ( 06 May 2022 11:09 )             41.3     05-06    136  |  105  |  15  ----------------------------<  90  4.3   |  27  |  0.78    Ca    8.7      06 May 2022 11:09  Phos  2.6     05-06  Mg     1.8     05-06    TPro  6.7  /  Alb  3.2<L>  /  TBili  0.9  /  DBili  0.3  /  AST  26  /  ALT  32  /  AlkPhos  81  05-06    SARS-CoV-2: Detected (05 May 2022 22:36)    CAPILLARY BLOOD GLUCOSE      Troponin I, High Sensitivity Result: 14.84: Thyroid Stimulating Hormone, Serum: 0.31 uU/mL (05.06.22 @ 11:09)Ammonia, Serum: 13 umol/L (05.06.22 @ 11:09) D-Dimer Assay, Quantitative: <150: A1C with Estimated Average Glucose (05.06.22 @ 07:24)   A1C with Estimated Average Glucose Result: 6.1: Lactate Dehydrogenase, Serum: 378 U/L (05.06.22 @ 05:01) Ferritin, Serum: 915 ng/mL (05.06.22 @ 05:01) Creatine Kinase, Serum: 130 U/L (05.06.22 @ 05:01) Urinalysis (05.05.22 @ 23:04)   pH Urine: 5.0   Glucose Qualitative, Urine: Negative   Blood, Urine: Small   Color: Yellow   Urine Appearance: Clear   Bilirubin: Negative   Ketone - Urine: Negative   Specific Gravity: 1.015   Protein, Urine: 15   Urobilinogen: Negative   Nitrite: Negative   Leukocyte Esterase Concentration: Negative Lactate, Blood: 1.1 mmol/L (05.05.22 @ 22:36) Respiratory Viral Panel with COVID-19 by MAKAYLA (05.05.22 @ 22:36)   Rapid RVP Result: Detected         RADIOLOGY:  < from: CT Head No Cont (05.05.22 @ 23:40) >  IMPRESSION: No intracranial hemorrhage or mass effect. 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    < end of copied text >      EKG:  < from: 12 Lead ECG (05.05.22 @ 22:10) >    Diagnosis Line Atrial fibrillation  Left axis deviation  Abnormal ECG  When compared with ECG of 20-MAY-2021 14:39,    < end of copied text >            I personally reviewed labs, imaging, ekg, orders and vitals.    Discussed case with:  [X]RN  [X]CM/SW  [X]Patient  []Family  [X]Specialist: Infectious Disease        MEDICATIONS  (STANDING):  aMIOdarone    Tablet 100 milliGRAM(s) Oral daily  apixaban 5 milliGRAM(s) Oral every 12 hours  atorvastatin 80 milliGRAM(s) Oral at bedtime  chlorhexidine 4% Liquid 1 Application(s) Topical <User Schedule>  dexAMETHasone     Tablet 6 milliGRAM(s) Oral daily  folic acid 1 milliGRAM(s) Oral daily  levETIRAcetam 500 milliGRAM(s) Oral two times a day  metoprolol tartrate 50 milliGRAM(s) Oral two times a day  remdesivir  IVPB   IV Intermittent     MEDICATIONS  (PRN):  acetaminophen     Tablet .. 650 milliGRAM(s) Oral every 4 hours PRN Temp greater or equal to 38.5C (101.3F)  ALBUTerol    90 MICROgram(s) HFA Inhaler 2 Puff(s) Inhalation every 4 hours PRN Shortness of Breath and/or Wheezing  guaifenesin/dextromethorphan Oral Liquid 10 milliLiter(s) Oral every 4 hours PRN Cough  ondansetron Injectable 4 milliGRAM(s) IV Push every 6 hours PRN Nausea and/or Vomiting  oxyCODONE    IR 10 milliGRAM(s) Oral every 8 hours PRN Severe Pain (7 - 10)    
Date of service: 22 @ 10:53    pt seen and examined  feels better   on RA currently  occasional cough  afebrile     ROS: no fever or chills; denies dizziness, no HA, no abdominal pain, no diarrhea or constipation; no dysuria, no urinary frequency, no legs pain, no rashes    MEDICATIONS  (STANDING):  aMIOdarone    Tablet 100 milliGRAM(s) Oral daily  apixaban 5 milliGRAM(s) Oral every 12 hours  atorvastatin 80 milliGRAM(s) Oral at bedtime  chlorhexidine 4% Liquid 1 Application(s) Topical <User Schedule>  dexAMETHasone     Tablet 6 milliGRAM(s) Oral daily  folic acid 1 milliGRAM(s) Oral daily  levETIRAcetam 500 milliGRAM(s) Oral two times a day  metoprolol tartrate 50 milliGRAM(s) Oral two times a day  remdesivir  IVPB   IV Intermittent   remdesivir  IVPB 100 milliGRAM(s) IV Intermittent every 24 hours    Vital Signs Last 24 Hrs  T(C): 36.1 (09 May 2022 08:51), Max: 36.6 (08 May 2022 20:09)  T(F): 97 (09 May 2022 08:51), Max: 97.9 (08 May 2022 20:09)  HR: 67 (09 May 2022 08:51) (67 - 79)  BP: 112/80 (09 May 2022 08:51) (112/80 - 136/76)  BP(mean): 91 (08 May 2022 20:09) (91 - 91)  RR: 18 (09 May 2022 08:51) (18 - 18)  SpO2: 100% (09 May 2022 08:51) (98% - 100%)    PE:  Constitutional: frail looking  HEENT: NC/AT, EOMI, PERRLA, conjunctivae clear; ears and nose atraumatic; pharynx benign  Neck: supple; thyroid not palpable  Back: no tenderness  Respiratory:  decreased breath sounds  Cardiovascular: S1S2 regular, no murmurs  Abdomen: soft, not tender, not distended, positive BS; liver and spleen WNL  Genitourinary: no suprapubic tenderness  Lymphatic: no LN palpable  Musculoskeletal: no muscle tenderness, no joint swelling or tenderness  Extremities: no pedal edema  Neurological/ Psychiatric:  moving all extremities  Skin: no rashes; no palpable lesions    Labs: all available labs reviewed                   x   |  x   |  x   ----------------------------<  x   x    |  x   |  0.94    Ca    9.0      08 May 2022 07:00    TPro  6.2  /  Alb  2.8<L>  /  TBili  0.8  /  DBili  0.2  /  AST  30  /  ALT  36  /  AlkPhos  69          LIVER FUNCTIONS - ( 06 May 2022 07:24 )  Alb: 3.3 g/dL / Pro: 6.6 gm/dL / ALK PHOS: 77 U/L / ALT: 32 U/L / AST: 26 U/L / GGT: x           Urinalysis Basic - ( 05 May 2022 23:04 )    Color: Yellow / Appearance: Clear / S.015 / pH: x  Gluc: x / Ketone: Negative  / Bili: Negative / Urobili: Negative   Blood: x / Protein: 15 / Nitrite: Negative   Leuk Esterase: Negative / RBC: 0-2 /HPF / WBC 0-2   Sq Epi: x / Non Sq Epi: Negative / Bacteria: Negative          Radiology: all available radiological tests reviewed  < from: CT Head No Cont (22 @ 23:40) >    ACC: [id number] EXAM:  CT BRAIN                          PROCEDURE DATE:  2022          INTERPRETATION:  CLINICAL INFORMATION: Altered mental status    TECHNIQUE: Noncontrast axial CT images of the brain were acquired from   the base of skull to vertex.    COMPARISON: CT head 2018.    FINDINGS: No intracranial hemorrhage is seen. Encephalomalacia/gliosis in   the right parietotemporal lobe. Moderate periventricular and subcortical   white matter hypoattenuation without mass effect is noted, non-specific,   but likely related to chronic small vessel ischemic changes.    Cerebral volume loss is present with proportional prominence of the sulci   and ventricles. No mass effect or midline shift is seen. Basal cisterns   are not effaced.    Mild ethmoid sinus mucosal thickening. The tympanomastoid cavities are   clear. Prior bilateral ocular lens surgery.    No osseous abnormality seen.    IMPRESSION: No intracranial hemorrhage or mass effect. 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    --- End of Report ---      < end of copied text >    Advanced directives addressed: full resuscitation

## 2022-05-10 NOTE — PROGRESS NOTE ADULT - REASON FOR ADMISSION
"concern for possible shaking movements"

## 2022-05-10 NOTE — PROGRESS NOTE ADULT - ASSESSMENT
78 y/o M PMHx significant for Atrial fibrillation on Eliquis, Psoriatic arthritis on Methotrexate, s/p back and knee surgery on chronic pain medication regimen, and seizure disorder who presents vis EMS for further evaluation and management of increased confusion/altered mental status over the past 2 days. HPI obtained from the patient's wife due to his overall AMS. The patient's wife notes that last night "was his worst night." The patient was reportedly confused, had signs of rigors, and was increasingly fatigued. The patient's wife states that "he was unable to get up off the couch" which prompted her to notify EMS. Of note the patient had his booster last week (Moderna). The patient's wife did admit to having had URI-like symptoms since last week although states she visited her family member at a local Rehab center where she tested negative for COVID-19 prior to entry. In the ED the patient was notably altered, initially AAO x 1 (now x2). In the ED the patient met SIRS criteria as he was noted to be febrile => TMax 103.1, Tachycardic 114/min, and Hypoxic 90% on room air.  Vital signs in triage => /77, /min, RR 20/min, TMax 0.3'F, SpO2 100% room air. Labs => WBC 11.03, INR 1.90, BUN/Cr 28/1.01, UA (-), COVID-19 (+). CT Head => No intracranial hemorrhage or mass effect. Stable encephalomalacia/gliosis in the right parietotemporal lobe. In the ED the patient was given Acetaminophen 1000mg IVPB x 1, Piperacillin/tazobactam 3.375g IVPB x 1, Vancomycin 1.25g IVPB x 1, and NS x 1L.    #Acute Infectious/Metabolic Encephalopathy due to Acute Hypoxic Respiratory Failure as a consequence of Viral Pneumonia secondary to Novel Coronavirus Infection  ~admit to Medicine  ~maintain on airborne isolation.  ~continue with O2 via nasal cannula and up-titrate as needed. If on non-rebreather mask, start continuous oximetry monitoring.  ~cont. Dexamethasone 6mg po daily   ~cont. Remdesevir  -to get  rem/dex last day today, 5/10 - normally uses walker at home, plan for dc to rehab   ~ID recs  ~cont. anti-pyretics prn with Acetaminophen 650 mg PO q4h PRN fever. Limit use of NSAIDs.  ~cont. HFA albuterol Q6 hour PRN via MDI. Would avoid nebulized preparations to limit risk of aerosol formation.  ~f/u Labs as ordered   -PT evaluation for deconditioning/weakness from above.     #Atrial Fibrillation  ~cont. Amiodarone 100mg po daily  ~cont. Metoprolol tartrate 50mg po bid  ~cont. Eliquis 5mg po bid    #Psoriatic Arthritis  ~takes Methotrexate 2.5mg (8 tablets) po q week (Tuesdays)  ~cont. Folic acid 1mg po daily    #Seizure Disorder  ~cont. Levetiracetam 500mg po q12h    #Chronic pain  ~takes Oxycodone 10mg po tid prn    #Goals of Care discussion  ~per patient's wife Tyra Lindsay [facility] [phone] (discussed with at bedside) the patient remains FULL CODE.    #Vte ppx  ~cont. Eliquis 5mg po bid    Dispo: Marked improvement in symptoms. Continue PT.   [facility], called patient's wife

## 2022-05-11 ENCOUNTER — TRANSCRIPTION ENCOUNTER (OUTPATIENT)
Age: 77
End: 2022-05-11

## 2022-05-11 VITALS
DIASTOLIC BLOOD PRESSURE: 72 MMHG | HEART RATE: 76 BPM | OXYGEN SATURATION: 98 % | RESPIRATION RATE: 18 BRPM | TEMPERATURE: 98 F | SYSTOLIC BLOOD PRESSURE: 146 MMHG

## 2022-05-11 LAB
ALBUMIN SERPL ELPH-MCNC: 2.8 G/DL — LOW (ref 3.3–5)
ALP SERPL-CCNC: 58 U/L — SIGNIFICANT CHANGE UP (ref 40–120)
ALT FLD-CCNC: 37 U/L — SIGNIFICANT CHANGE UP (ref 12–78)
AST SERPL-CCNC: 27 U/L — SIGNIFICANT CHANGE UP (ref 15–37)
BILIRUB DIRECT SERPL-MCNC: 0.3 MG/DL — SIGNIFICANT CHANGE UP (ref 0–0.3)
BILIRUB INDIRECT FLD-MCNC: 1 MG/DL — SIGNIFICANT CHANGE UP (ref 0.2–1)
BILIRUB SERPL-MCNC: 1.3 MG/DL — HIGH (ref 0.2–1.2)
CREAT SERPL-MCNC: 0.82 MG/DL — SIGNIFICANT CHANGE UP (ref 0.5–1.3)
CULTURE RESULTS: SIGNIFICANT CHANGE UP
CULTURE RESULTS: SIGNIFICANT CHANGE UP
EGFR: 89 ML/MIN/1.73M2 — SIGNIFICANT CHANGE UP
INR BLD: 2.26 RATIO — HIGH (ref 0.88–1.16)
PROT SERPL-MCNC: 5.8 GM/DL — LOW (ref 6–8.3)
PROTHROM AB SERPL-ACNC: 26.4 SEC — HIGH (ref 10.5–13.4)
SPECIMEN SOURCE: SIGNIFICANT CHANGE UP
SPECIMEN SOURCE: SIGNIFICANT CHANGE UP

## 2022-05-11 PROCEDURE — 99239 HOSP IP/OBS DSCHRG MGMT >30: CPT

## 2022-05-11 RX ORDER — ALBUTEROL 90 UG/1
2 AEROSOL, METERED ORAL
Qty: 1 | Refills: 0
Start: 2022-05-11 | End: 2022-05-24

## 2022-05-11 RX ORDER — METHOTREXATE 2.5 MG/1
8 TABLET ORAL
Qty: 0 | Refills: 0 | DISCHARGE

## 2022-05-11 RX ADMIN — Medication 10 MILLILITER(S): at 06:12

## 2022-05-11 RX ADMIN — Medication 650 MILLIGRAM(S): at 12:41

## 2022-05-11 RX ADMIN — Medication 10 MILLILITER(S): at 12:41

## 2022-05-11 RX ADMIN — AMIODARONE HYDROCHLORIDE 100 MILLIGRAM(S): 400 TABLET ORAL at 10:36

## 2022-05-11 RX ADMIN — Medication 1 MILLIGRAM(S): at 10:35

## 2022-05-11 RX ADMIN — Medication 650 MILLIGRAM(S): at 13:20

## 2022-05-11 RX ADMIN — Medication 50 MILLIGRAM(S): at 10:36

## 2022-05-11 RX ADMIN — CHLORHEXIDINE GLUCONATE 1 APPLICATION(S): 213 SOLUTION TOPICAL at 12:11

## 2022-05-11 RX ADMIN — Medication 10 MILLILITER(S): at 00:00

## 2022-05-11 RX ADMIN — Medication 6 MILLIGRAM(S): at 10:36

## 2022-05-11 RX ADMIN — APIXABAN 5 MILLIGRAM(S): 2.5 TABLET, FILM COATED ORAL at 10:35

## 2022-05-11 RX ADMIN — LEVETIRACETAM 500 MILLIGRAM(S): 250 TABLET, FILM COATED ORAL at 10:35

## 2022-05-11 NOTE — DISCHARGE NOTE PROVIDER - HOSPITAL COURSE
76 y/o M PMHx significant for Atrial fibrillation on Eliquis, Psoriatic arthritis on Methotrexate, s/p back and knee surgery on chronic pain medication regimen, and seizure disorder who presents vis EMS for further evaluation and management of increased confusion/altered mental status over the past 2 days. HPI obtained from the patient's wife due to his overall AMS. The patient's wife notes that last night "was his worst night." The patient was reportedly confused, had signs of rigors, and was increasingly fatigued. The patient's wife states that "he was unable to get up off the couch" which prompted her to notify EMS. Of note the patient had his booster last week (Moderna). The patient's wife did admit to having had URI-like symptoms since last week although states she visited her family member at a local Rehab center where she tested negative for COVID-19 prior to entry. In the ED the patient was notably altered, initially AAO x 1 (now x2). In the ED the patient met SIRS criteria as he was noted to be febrile => TMax 103.1, Tachycardic 114/min, and Hypoxic 90% on room air. Vital signs in triage => /77, /min, RR 20/min, TMax 0.3'F, SpO2 100% room air.     HOSPITAL COURSE:  #Acute Infectious/Metabolic Encephalopathy due to Acute Hypoxic Respiratory Failure as a consequence of Viral Pneumonia secondary to Novel Coronavirus Infection  ~maintain on airborne isolation.  ~continue with O2 via nasal cannula and now ambulating off oxygen   - s/p remdesevir and dex    - normally uses walker at home, pt does not want rehab, he prefers to go home    ~cont. HFA albuterol Q6 hour PRN via MDI.    #Atrial Fibrillation  ~cont. Amiodarone 100mg po daily  ~cont. Metoprolol tartrate 50mg po bid  ~cont. Eliquis 5mg po bid    #Psoriatic Arthritis  ~takes Methotrexate 2.5mg (8 tablets) po q week (Tuesdays)  ~cont. Folic acid 1mg po daily    #Seizure Disorder  ~cont. Levetiracetam 500mg po q12h    #Chronic pain  ~takes Oxycodone 10mg po tid prn    #Goals of Care discussion  ~per patient's wife Tyra Lindsay [facility] [phone] (discussed with at bedside) the patient remains FULL CODE.    #Vte ppx  ~cont. Eliquis 5mg po bid    Dispo: Marked improvement in symptoms. Continue PT.   Discharge to [facility] tmr, called patient's wife and discussed POC     OTHER DETAILS:   5/11 - no cp palps sob abdo pain tingling or numbness, eager to go home, has been ambulating in the room     PHYSICAL EXAM:  Vital Signs Last 24 Hrs  T(F): 98.2 (11 May 2022 08:08), Max: 98.2 (11 May 2022 08:08)  HR: 79 (11 May 2022 08:08) (77 - 81)  BP: 150/76 (11 May 2022 08:08) (121/86 - 160/89)  RR: 18 (11 May 2022 08:08) (18 - 18)  SpO2: 97% (11 May 2022 08:08) (97% - 97%)  GENERAL: NAD, able to lie flat in bed  HEAD:  Atraumatic, Normocephalic  EYES: EOMI, PERRLA, normal sclera  ENT: Moist mucous membranes  NECK: Supple, No JVD, no nuchal rigidity  CHEST/LUNG: Clear to auscultation bilaterally; No rales, rhonchi, wheezing, or rubs. Unlabored respirations  HEART: Regular rate and rhythm; No murmurs, rubs, or gallops  ABDOMEN: Bowel sounds present; Soft, Nontender, Nondistended. No hepatomegaly  EXTREMITIES:  no pitting bilaterally  NERVOUS SYSTEM:  Alert & Oriented X3, speech clear. No focal motor or sensory deficits  MSK: FROM all 4 extremities, full and equal strength  SKIN: No rashes or lesions    LABS: All Labs Reviewed:  x   |  x   |  x   ----------------------------<  x   x    |  x   |  0.82  TPro  5.8<L>  /  Alb  2.8<L>  /  TBili  1.3<H>  /  DBili  0.3  /  AST  27  /  ALT  37  /  AlkPhos  58  05-11    RADIOLOGY/EKG:  < from: CT Head No Cont (05.05.22 @ 23:40) >  IMPRESSION: No intracranial hemorrhage or mass effect. 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      time spent on discharge - 55 mins

## 2022-05-11 NOTE — DISCHARGE NOTE PROVIDER - NSDCCPCAREPLAN_GEN_ALL_CORE_FT
PRINCIPAL DISCHARGE DIAGNOSIS  Diagnosis: Acute metabolic encephalopathy  Assessment and Plan of Treatment: due to poor oxygenation from viral pneumonia secondary to Novel Coronavirus Infection  You got steroids and remdesevir, Oxygenation stable here.  Continue to monitor yourself for symptoms at home and return to the ED if you have chest pain, racing heart rate or shortness of breath.      SECONDARY DISCHARGE DIAGNOSES  Diagnosis: Psoriatic arthritis  Assessment and Plan of Treatment: See your doctor and resume methotrexate when feeling better - consider resuming in 1-2 weeks

## 2022-05-11 NOTE — DISCHARGE NOTE NURSING/CASE MANAGEMENT/SOCIAL WORK - PATIENT PORTAL LINK FT
You can access the FollowMyHealth Patient Portal offered by [facility] by registering at the following website: [email or web address]. By joining [employer]’s FollowMyHealth portal, you will also be able to view your health information using other applications (apps) compatible with our system.

## 2022-05-11 NOTE — DISCHARGE NOTE NURSING/CASE MANAGEMENT/SOCIAL WORK - NSDCPEFALRISK_GEN_ALL_CORE
For information on Fall & Injury Prevention, visit: [email or web address] OR  [email or web address] OR  https://www.cdc.gov/steadi/patient.html

## 2022-05-11 NOTE — DISCHARGE NOTE PROVIDER - NSDCMRMEDTOKEN_GEN_ALL_CORE_FT
albuterol 90 mcg/inh inhalation aerosol: 2 puff(s) inhaled every 6 to 8 hours, As Needed -Shortness of Breath and/or Wheezing   amiodarone 100 mg oral tablet: 1 tab(s) orally once a day  Aspirin Enteric Coated 81 mg oral delayed release tablet: 1 tab(s) orally once a day  atorvastatin 80 mg oral tablet: 1 tab(s) orally once a day  Eliquis 5 mg oral tablet: 1 tab(s) orally 2 times a day  folic acid 1 mg oral tablet: 1 tab(s) orally once a day  levETIRAcetam 250 mg oral tablet: 2 tab(s) orally 2 times a day  Metoprolol Tartrate 50 mg oral tablet: 1 tab(s) orally 2 times a day  oxyCODONE 10 mg oral tablet: 1 tab(s) orally every 8 hours, As Needed

## 2022-05-18 DIAGNOSIS — I48.0 PAROXYSMAL ATRIAL FIBRILLATION: ICD-10-CM

## 2022-05-18 DIAGNOSIS — J96.01 ACUTE RESPIRATORY FAILURE WITH HYPOXIA: ICD-10-CM

## 2022-05-18 DIAGNOSIS — J12.82 PNEUMONIA DUE TO CORONAVIRUS DISEASE 2019: ICD-10-CM

## 2022-05-18 DIAGNOSIS — L40.50 ARTHROPATHIC PSORIASIS, UNSPECIFIED: ICD-10-CM

## 2022-05-18 DIAGNOSIS — U07.1 COVID-19: ICD-10-CM

## 2022-05-18 DIAGNOSIS — Z79.01 LONG TERM (CURRENT) USE OF ANTICOAGULANTS: ICD-10-CM

## 2022-05-18 DIAGNOSIS — G40.909 EPILEPSY, UNSPECIFIED, NOT INTRACTABLE, WITHOUT STATUS EPILEPTICUS: ICD-10-CM

## 2022-05-18 DIAGNOSIS — G93.40 ENCEPHALOPATHY, UNSPECIFIED: ICD-10-CM

## 2022-06-15 ENCOUNTER — INPATIENT (INPATIENT)
Facility: HOSPITAL | Age: 77
LOS: 2 days | Discharge: HOME HEALTH | DRG: 871 | End: 2022-06-18
Attending: FAMILY MEDICINE | Admitting: INTERNAL MEDICINE
Payer: MEDICARE

## 2022-06-15 VITALS
HEIGHT: 69 IN | RESPIRATION RATE: 20 BRPM | SYSTOLIC BLOOD PRESSURE: 162 MMHG | WEIGHT: 156.09 LBS | TEMPERATURE: 103 F | DIASTOLIC BLOOD PRESSURE: 82 MMHG | HEART RATE: 126 BPM | OXYGEN SATURATION: 90 %

## 2022-06-15 DIAGNOSIS — N39.0 URINARY TRACT INFECTION, SITE NOT SPECIFIED: ICD-10-CM

## 2022-06-15 DIAGNOSIS — Z98.1 ARTHRODESIS STATUS: Chronic | ICD-10-CM

## 2022-06-15 DIAGNOSIS — Z96.651 PRESENCE OF RIGHT ARTIFICIAL KNEE JOINT: Chronic | ICD-10-CM

## 2022-06-15 DIAGNOSIS — Z98.49 CATARACT EXTRACTION STATUS, UNSPECIFIED EYE: Chronic | ICD-10-CM

## 2022-06-15 DIAGNOSIS — Z90.89 ACQUIRED ABSENCE OF OTHER ORGANS: Chronic | ICD-10-CM

## 2022-06-15 LAB
ALBUMIN SERPL ELPH-MCNC: 3.2 G/DL — LOW (ref 3.3–5)
ALP SERPL-CCNC: 78 U/L — SIGNIFICANT CHANGE UP (ref 40–120)
ALT FLD-CCNC: 21 U/L — SIGNIFICANT CHANGE UP (ref 12–78)
ANION GAP SERPL CALC-SCNC: 7 MMOL/L — SIGNIFICANT CHANGE UP (ref 5–17)
APPEARANCE UR: ABNORMAL
APTT BLD: 38.5 SEC — HIGH (ref 27.5–35.5)
AST SERPL-CCNC: 20 U/L — SIGNIFICANT CHANGE UP (ref 15–37)
BASOPHILS # BLD AUTO: 0.04 K/UL — SIGNIFICANT CHANGE UP (ref 0–0.2)
BASOPHILS NFR BLD AUTO: 0.2 % — SIGNIFICANT CHANGE UP (ref 0–2)
BILIRUB SERPL-MCNC: 1.3 MG/DL — HIGH (ref 0.2–1.2)
BILIRUB UR-MCNC: NEGATIVE — SIGNIFICANT CHANGE UP
BUN SERPL-MCNC: 15 MG/DL — SIGNIFICANT CHANGE UP (ref 7–23)
CALCIUM SERPL-MCNC: 8.9 MG/DL — SIGNIFICANT CHANGE UP (ref 8.5–10.1)
CHLORIDE SERPL-SCNC: 107 MMOL/L — SIGNIFICANT CHANGE UP (ref 96–108)
CO2 SERPL-SCNC: 26 MMOL/L — SIGNIFICANT CHANGE UP (ref 22–31)
COLOR SPEC: YELLOW — SIGNIFICANT CHANGE UP
CREAT SERPL-MCNC: 0.95 MG/DL — SIGNIFICANT CHANGE UP (ref 0.5–1.3)
DIFF PNL FLD: ABNORMAL
EGFR: 81 ML/MIN/1.73M2 — SIGNIFICANT CHANGE UP
EOSINOPHIL # BLD AUTO: 0.01 K/UL — SIGNIFICANT CHANGE UP (ref 0–0.5)
EOSINOPHIL NFR BLD AUTO: 0 % — SIGNIFICANT CHANGE UP (ref 0–6)
GLUCOSE SERPL-MCNC: 114 MG/DL — HIGH (ref 70–99)
GLUCOSE UR QL: NEGATIVE — SIGNIFICANT CHANGE UP
HCT VFR BLD CALC: 37.1 % — LOW (ref 39–50)
HGB BLD-MCNC: 12.3 G/DL — LOW (ref 13–17)
IMM GRANULOCYTES NFR BLD AUTO: 0.7 % — SIGNIFICANT CHANGE UP (ref 0–1.5)
INR BLD: 2.3 RATIO — HIGH (ref 0.88–1.16)
KETONES UR-MCNC: NEGATIVE — SIGNIFICANT CHANGE UP
LACTATE SERPL-SCNC: 1.4 MMOL/L — SIGNIFICANT CHANGE UP (ref 0.7–2)
LEUKOCYTE ESTERASE UR-ACNC: ABNORMAL
LYMPHOCYTES # BLD AUTO: 0.67 K/UL — LOW (ref 1–3.3)
LYMPHOCYTES # BLD AUTO: 3.1 % — LOW (ref 13–44)
MCHC RBC-ENTMCNC: 33.1 PG — SIGNIFICANT CHANGE UP (ref 27–34)
MCHC RBC-ENTMCNC: 33.2 GM/DL — SIGNIFICANT CHANGE UP (ref 32–36)
MCV RBC AUTO: 99.7 FL — SIGNIFICANT CHANGE UP (ref 80–100)
MONOCYTES # BLD AUTO: 1.26 K/UL — HIGH (ref 0–0.9)
MONOCYTES NFR BLD AUTO: 5.8 % — SIGNIFICANT CHANGE UP (ref 2–14)
NEUTROPHILS # BLD AUTO: 19.42 K/UL — HIGH (ref 1.8–7.4)
NEUTROPHILS NFR BLD AUTO: 90.2 % — HIGH (ref 43–77)
NITRITE UR-MCNC: POSITIVE
PH UR: 6 — SIGNIFICANT CHANGE UP (ref 5–8)
PLATELET # BLD AUTO: 141 K/UL — LOW (ref 150–400)
POTASSIUM SERPL-MCNC: 4.3 MMOL/L — SIGNIFICANT CHANGE UP (ref 3.5–5.3)
POTASSIUM SERPL-SCNC: 4.3 MMOL/L — SIGNIFICANT CHANGE UP (ref 3.5–5.3)
PROT SERPL-MCNC: 6.7 GM/DL — SIGNIFICANT CHANGE UP (ref 6–8.3)
PROT UR-MCNC: 30 MG/DL
PROTHROM AB SERPL-ACNC: 26.9 SEC — HIGH (ref 10.5–13.4)
RAPID RVP RESULT: SIGNIFICANT CHANGE UP
RBC # BLD: 3.72 M/UL — LOW (ref 4.2–5.8)
RBC # FLD: 14 % — SIGNIFICANT CHANGE UP (ref 10.3–14.5)
SARS-COV-2 RNA SPEC QL NAA+PROBE: SIGNIFICANT CHANGE UP
SODIUM SERPL-SCNC: 140 MMOL/L — SIGNIFICANT CHANGE UP (ref 135–145)
SP GR SPEC: 1.01 — SIGNIFICANT CHANGE UP (ref 1.01–1.02)
UROBILINOGEN FLD QL: NEGATIVE — SIGNIFICANT CHANGE UP
WBC # BLD: 21.56 K/UL — HIGH (ref 3.8–10.5)
WBC # FLD AUTO: 21.56 K/UL — HIGH (ref 3.8–10.5)

## 2022-06-15 PROCEDURE — 93005 ELECTROCARDIOGRAM TRACING: CPT

## 2022-06-15 PROCEDURE — 97162 PT EVAL MOD COMPLEX 30 MIN: CPT | Mod: GP

## 2022-06-15 PROCEDURE — 85025 COMPLETE CBC W/AUTO DIFF WBC: CPT

## 2022-06-15 PROCEDURE — 97116 GAIT TRAINING THERAPY: CPT | Mod: GP

## 2022-06-15 PROCEDURE — 80053 COMPREHEN METABOLIC PANEL: CPT

## 2022-06-15 PROCEDURE — 71045 X-RAY EXAM CHEST 1 VIEW: CPT | Mod: 26

## 2022-06-15 PROCEDURE — 80177 DRUG SCRN QUAN LEVETIRACETAM: CPT

## 2022-06-15 PROCEDURE — 99285 EMERGENCY DEPT VISIT HI MDM: CPT

## 2022-06-15 PROCEDURE — 85610 PROTHROMBIN TIME: CPT

## 2022-06-15 PROCEDURE — 71047 X-RAY EXAM CHEST 3 VIEWS: CPT

## 2022-06-15 PROCEDURE — 99223 1ST HOSP IP/OBS HIGH 75: CPT | Mod: GC

## 2022-06-15 PROCEDURE — 36415 COLL VENOUS BLD VENIPUNCTURE: CPT

## 2022-06-15 RX ORDER — ATORVASTATIN CALCIUM 80 MG/1
1 TABLET, FILM COATED ORAL
Qty: 0 | Refills: 0 | DISCHARGE

## 2022-06-15 RX ORDER — LEVETIRACETAM 250 MG/1
1 TABLET, FILM COATED ORAL
Qty: 0 | Refills: 0 | DISCHARGE

## 2022-06-15 RX ORDER — CEFTRIAXONE 500 MG/1
1000 INJECTION, POWDER, FOR SOLUTION INTRAMUSCULAR; INTRAVENOUS ONCE
Refills: 0 | Status: COMPLETED | OUTPATIENT
Start: 2022-06-15 | End: 2022-06-15

## 2022-06-15 RX ORDER — ACETAMINOPHEN 500 MG
1000 TABLET ORAL ONCE
Refills: 0 | Status: COMPLETED | OUTPATIENT
Start: 2022-06-15 | End: 2022-06-15

## 2022-06-15 RX ORDER — SODIUM CHLORIDE 9 MG/ML
2200 INJECTION INTRAMUSCULAR; INTRAVENOUS; SUBCUTANEOUS ONCE
Refills: 0 | Status: COMPLETED | OUTPATIENT
Start: 2022-06-15 | End: 2022-06-15

## 2022-06-15 RX ORDER — ACETAMINOPHEN 500 MG
650 TABLET ORAL EVERY 6 HOURS
Refills: 0 | Status: DISCONTINUED | OUTPATIENT
Start: 2022-06-15 | End: 2022-06-18

## 2022-06-15 RX ADMIN — CEFTRIAXONE 100 MILLIGRAM(S): 500 INJECTION, POWDER, FOR SOLUTION INTRAMUSCULAR; INTRAVENOUS at 18:08

## 2022-06-15 RX ADMIN — Medication 1000 MILLIGRAM(S): at 17:43

## 2022-06-15 RX ADMIN — SODIUM CHLORIDE 2200 MILLILITER(S): 9 INJECTION INTRAMUSCULAR; INTRAVENOUS; SUBCUTANEOUS at 17:43

## 2022-06-15 NOTE — ED ADULT TRIAGE NOTE - CHIEF COMPLAINT QUOTE
patient brought in by EMS from home c/o fever patient brought in by EMS from home c/o fever.  per EMS, wife noted patient to be increasingly confused since last night.  febrile today to 102.  patient notes mild SOB.

## 2022-06-15 NOTE — H&P ADULT - MUSCULOSKELETAL
normal/ROM intact/strength 5/5 bilateral upper extremities/strength 5/5 bilateral lower extremities ROM intact/strength 5/5 bilateral upper extremities/strength 5/5 bilateral lower extremities

## 2022-06-15 NOTE — H&P ADULT - RESPIRATORY
normal/clear to auscultation bilaterally/no wheezes/no rales/no rhonchi/no respiratory distress/no use of accessory muscles/breath sounds equal clear to auscultation bilaterally/no wheezes/no rales/no rhonchi/no respiratory distress/no use of accessory muscles/breath sounds equal

## 2022-06-15 NOTE — PHARMACOTHERAPY INTERVENTION NOTE - COMMENTS
Medication reconciliation completed.  Patient was unable to provide medication information, spoke to wife Tyra at bedside and they provided current medication list; confirmed with Dr. [facility].

## 2022-06-15 NOTE — ED ADULT NURSE NOTE - CHIEF COMPLAINT QUOTE
patient brought in by EMS from home c/o fever.  per EMS, wife noted patient to be increasingly confused since last night.  febrile today to 102.  patient notes mild SOB.

## 2022-06-15 NOTE — ED PROVIDER NOTE - NS ED ROS FT
Constitutional: +fever  Eyes: No visual changes  HEENT: No throat pain  CV: No chest pain  Resp: No SOB no cough  GI: No abd pain, nausea or vomiting  : No dysuria  MSK: No musculoskeletal pain  Skin: No rash  Neuro: No headache

## 2022-06-15 NOTE — H&P ADULT - HISTORY OF PRESENT ILLNESS
78yo man with pmhx significant for Afib on eliquis, Psoriatic arthritis, CVA, Seizure, HTN, cataracts, presenting to [facility] from home for Fever. Patient confused AAOx1 and poor historian. [facility]. As per his wife, she noticed that patient was confused since yesterday evening. she first noted that he kept waking her up during the night to ask " when is dinner" she states that this morning patient was tired and taking several naps, he was unable to use the bathroom properly "made a mess" and after leaving him to sleep she realized he had a fever and patient reported he didn't feel well and was unable to get up from his chair and had difficulty walking. Patient's wife states that he has had a UTI in the past year when he got covid and had similar symptoms of being confused.  wife denies any cough, SOB, Chest pain, n/v, urinary incontinence, abdominal pain. Patient denies any complaints.    In the ED T: 102.9 /82  O2sat 90%on RA s/p 2200 ml NS rocephin x 1 dose

## 2022-06-15 NOTE — H&P ADULT - NEUROLOGICAL
normal/cranial nerves II-XII intact/sensation intact/responds to pain/responds to verbal commands cranial nerves II-XII intact/sensation intact/responds to pain/responds to verbal commands

## 2022-06-15 NOTE — H&P ADULT - NSHPOUTPATIENTPROVIDERS_GEN_ALL_CORE
pcp: Dr. Palacio  Cardio: Dr. Petit  Optho: Dr. Tesfaye  Pain: Dr. Banks PCP: Dr. Palacio  Cardiology: Dr. Petit  Opthalmology: Dr. Mireles  Pain Medicine: Dr. Banks

## 2022-06-15 NOTE — H&P ADULT - ATTENDING COMMENTS
76 y/o M PMHx as noted above admitted for further evaluation and management of acute infectious encephalopathy secondary to septicemia due to a complicated UTI.    #Acute Infectious Encephalopathy secondary to Septicemia due to a Complicated UTI  ~admit to Medicine  ~as noted above patient's baseline mental status => is AAOx3  ~patient found to have met sepsis criteria => /min, TMax 102.9'F, WBC 21.56, (+)UTI  ~patient is s/p 30cc/kg IV hydration in the ED  ~strict I/Os  ~Urinalysis reviewed   ~f/u PAN C+S => urine cx, blood cx  ~cont. Ceftriaxone 1g IVP daily  ~f/u repeat labs in the am    #Paroxysmal Atrial fibrillation  ~cont. Amiodarone 100mg po daily   ~cont. Eliquis 5mg po BID  ~cont. Metoprolol tartrate 50mg po BID    #Psoriatic Arthritis  ~cont. Betamethasone .05% cream as directed  ~cont. Methotrexate 20mg (8 tablets of 2.5mg) every Tuesday  ~cont. Folic acid 1mg po daily    #Hx of CVA  ~cont. ASA 81mg po daily    #Hx of seizure   ~cont. Levetiracetam 500mg po BID  ~f/u Levetiracetam serum level in the am     #Hyperlipidemia  ~cont. statin therapy with Atorvastatin 80mg po qhs    #Vte ppx  ~cont. Eliquis

## 2022-06-15 NOTE — H&P ADULT - NSHPPHYSICALEXAM_GEN_ALL_CORE
Vital Signs Last 24 Hrs  T(C): 36.8 (15 Jose 2022 23:10), Max: 39.4 (15 Jose 2022 17:09)  T(F): 98.3 (15 Jose 2022 23:10), Max: 102.9 (15 Jose 2022 17:09)  HR: 89 (15 Jose 2022 23:10) (82 - 126)  BP: 136/84 (15 Jose 2022 23:10) (97/75 - 166/97)  BP(mean): 80 (15 Jose 2022 19:00) (80 - 136)  RR: 18 (15 Jose 2022 23:10) (17 - 22)  SpO2: 100% (15 Jose 2022 23:10) (90% - 100%)

## 2022-06-15 NOTE — ED PROVIDER NOTE - OBJECTIVE STATEMENT
78 y/o male with a PMHx of psoriatic arthritis, seizure disorder, CVA, cardiac arrhythmia, and afib presents to the ED c/o fever. Pt lives at home with son and wife. Denies malaise, cough, CP, abd pain, and dysuria.
no

## 2022-06-15 NOTE — ED PROVIDER NOTE - PHYSICAL EXAMINATION
Constitutional: NAD . Chronically ill appearing  Eyes: PERRL EOMI  Head: Normocephalic atraumatic  Mouth: MMM  Cardiac: regular rate and rhythm  Resp: Lungs CTAB  GI: Abd s/nd/nt  Neuro: CN2-12 grossly intact, [place] x 4  Skin: No visible rashes

## 2022-06-15 NOTE — H&P ADULT - GASTROINTESTINAL
normal/soft/nontender/nondistended/normal active bowel sounds soft/nontender/nondistended/normal active bowel sounds

## 2022-06-15 NOTE — ED ADULT NURSE REASSESSMENT NOTE - NS ED NURSE REASSESS COMMENT FT1
report received from JANESSA Waller. Pt resting comfortably ni stretcher. Wife at the bedside. Given food and chapstick. No further complaints at this time.

## 2022-06-15 NOTE — H&P ADULT - ASSESSMENT
76yo man with pmhx significant for Afib on eliquis, Psoriatic arthritis, CVA, Seizure, HTN, cataracts, presenting to [facility] from home for Fever. Patient found to be Febrile to 102.9, Tachycardic to 126, saturating 90% on RA. Patient is admitted for:     #AMS 2/2 sepsis in setting of UTI  -patient AAOx1 as per wife baseline is AAOx3  -patient tachycardic 126, [id number], with WBC 21.56   -meeting SIRS criteria  -s/p 2200ml fluid in ED and 1x dose ceftriaxone  -UA +nitrites, leuk esterase, TNTC bacteria  -f/u urine cx  -f/u blood cx  - will continue Ceftriaxone IV 1g daily  -cbc, cmp in am   -cw routine vitals  - pt eval in am as wife stated patient was weak     #Afib paroxysmal  -amiodarone 100mg daily   -continue eliquis 5mg BID  -Metoprolol tartrate 50mg BID  -INR in am     #psoriatic arthiritis  - bethamethsone .05 cream  -methotraxate 20mg (8 tablets of 2.5mg) every tuesday  -folic acid 1mg daily  -oxycodone 10mg Q8    #hx of CVA  #hx of seizure   -patient with seizure disorder as complication of CVA  -ASA 81mg seizure  -keppra 500mg BID  -keppra level in am     #HLD  -atorvastatin 80mg QHS    #Diet  -regular    #VTE ppx  -cw eliquis 5mg BID    #code status  -FULL CODE  -discussed with wife over the phone     case discussed with Dr. Wilson 76yo man with pmhx significant for Afib on eliquis, Psoriatic arthritis, CVA, Seizure, HTN, cataracts, presenting to [facility] from home for Fever. Patient found to be Febrile to 102.9, Tachycardic to 126, saturating 90% on RA. Patient is admitted for:     #AMS 2/2 sepsis in setting of UTI  -patient AAOx1 as per wife baseline is AAOx3  -patient tachycardic 126, [id number], with WBC 21.56   -meeting SIRS criteria  -s/p 2200ml fluid in ED and 1x dose ceftriaxone  -UA +nitrites, leuk esterase, TNTC bacteria  -f/u urine cx  -f/u blood cx  - will continue Ceftriaxone IV 1g daily  -cbc, cmp in am   -cw routine vitals  - pt eval in am as wife stated patient was weak     #Afib paroxysmal  -amiodarone 100mg daily   -continue eliquis 5mg BID  -Metoprolol tartrate 50mg BID    #psoriatic arthiritis  - bethamethsone .05 cream  -methotraxate 20mg (8 tablets of 2.5mg) every tuesday  -folic acid 1mg daily  -oxycodone 10mg Q8    #hx of CVA  #hx of seizure   -patient with seizure disorder as complication of CVA  -ASA 81mg seizure  -keppra 500mg BID  -keppra level in am     #HLD  -atorvastatin 80mg QHS    #Diet  -regular    #VTE ppx  -cw eliquis 5mg BID    #code status  -FULL CODE  -discussed with wife over the phone     case discussed with Dr. Wilson

## 2022-06-15 NOTE — ED PROVIDER NOTE - DOMESTIC TRAVEL HIGH RISK QUESTION
No Pt reassessed. imaging reviewed with pt.  Advised close follow up with PMD  in 1-2 days for reevaluation and pt agreed.  Return precautions advised and pt verbalized understanding. Bedside reassessment & d/c instructions in [place]an by MARIA G Decker.

## 2022-06-16 LAB
ADD ON TEST-SPECIMEN IN LAB: SIGNIFICANT CHANGE UP
ALBUMIN SERPL ELPH-MCNC: 2.7 G/DL — LOW (ref 3.3–5)
ALP SERPL-CCNC: 63 U/L — SIGNIFICANT CHANGE UP (ref 40–120)
ALT FLD-CCNC: 19 U/L — SIGNIFICANT CHANGE UP (ref 12–78)
ANION GAP SERPL CALC-SCNC: 9 MMOL/L — SIGNIFICANT CHANGE UP (ref 5–17)
AST SERPL-CCNC: 17 U/L — SIGNIFICANT CHANGE UP (ref 15–37)
BASOPHILS # BLD AUTO: 0.03 K/UL — SIGNIFICANT CHANGE UP (ref 0–0.2)
BASOPHILS NFR BLD AUTO: 0.2 % — SIGNIFICANT CHANGE UP (ref 0–2)
BILIRUB SERPL-MCNC: 1.6 MG/DL — HIGH (ref 0.2–1.2)
BUN SERPL-MCNC: 11 MG/DL — SIGNIFICANT CHANGE UP (ref 7–23)
CALCIUM SERPL-MCNC: 8.5 MG/DL — SIGNIFICANT CHANGE UP (ref 8.5–10.1)
CHLORIDE SERPL-SCNC: 108 MMOL/L — SIGNIFICANT CHANGE UP (ref 96–108)
CO2 SERPL-SCNC: 23 MMOL/L — SIGNIFICANT CHANGE UP (ref 22–31)
CREAT SERPL-MCNC: 0.67 MG/DL — SIGNIFICANT CHANGE UP (ref 0.5–1.3)
EGFR: 95 ML/MIN/1.73M2 — SIGNIFICANT CHANGE UP
EOSINOPHIL # BLD AUTO: 0 K/UL — SIGNIFICANT CHANGE UP (ref 0–0.5)
EOSINOPHIL NFR BLD AUTO: 0 % — SIGNIFICANT CHANGE UP (ref 0–6)
GLUCOSE SERPL-MCNC: 98 MG/DL — SIGNIFICANT CHANGE UP (ref 70–99)
HCT VFR BLD CALC: 33.3 % — LOW (ref 39–50)
HGB BLD-MCNC: 11 G/DL — LOW (ref 13–17)
IMM GRANULOCYTES NFR BLD AUTO: 0.7 % — SIGNIFICANT CHANGE UP (ref 0–1.5)
INR BLD: 1.9 RATIO — HIGH (ref 0.88–1.16)
LYMPHOCYTES # BLD AUTO: 0.67 K/UL — LOW (ref 1–3.3)
LYMPHOCYTES # BLD AUTO: 3.8 % — LOW (ref 13–44)
MCHC RBC-ENTMCNC: 33 GM/DL — SIGNIFICANT CHANGE UP (ref 32–36)
MCHC RBC-ENTMCNC: 33.1 PG — SIGNIFICANT CHANGE UP (ref 27–34)
MCV RBC AUTO: 100.3 FL — HIGH (ref 80–100)
MONOCYTES # BLD AUTO: 0.77 K/UL — SIGNIFICANT CHANGE UP (ref 0–0.9)
MONOCYTES NFR BLD AUTO: 4.3 % — SIGNIFICANT CHANGE UP (ref 2–14)
NEUTROPHILS # BLD AUTO: 16.12 K/UL — HIGH (ref 1.8–7.4)
NEUTROPHILS NFR BLD AUTO: 91 % — HIGH (ref 43–77)
PLATELET # BLD AUTO: 117 K/UL — LOW (ref 150–400)
POTASSIUM SERPL-MCNC: 3.8 MMOL/L — SIGNIFICANT CHANGE UP (ref 3.5–5.3)
POTASSIUM SERPL-SCNC: 3.8 MMOL/L — SIGNIFICANT CHANGE UP (ref 3.5–5.3)
PROT SERPL-MCNC: 5.7 GM/DL — LOW (ref 6–8.3)
PROTHROM AB SERPL-ACNC: 22.2 SEC — HIGH (ref 10.5–13.4)
RBC # BLD: 3.32 M/UL — LOW (ref 4.2–5.8)
RBC # FLD: 14 % — SIGNIFICANT CHANGE UP (ref 10.3–14.5)
SODIUM SERPL-SCNC: 140 MMOL/L — SIGNIFICANT CHANGE UP (ref 135–145)
WBC # BLD: 17.72 K/UL — HIGH (ref 3.8–10.5)
WBC # FLD AUTO: 17.72 K/UL — HIGH (ref 3.8–10.5)

## 2022-06-16 PROCEDURE — 93010 ELECTROCARDIOGRAM REPORT: CPT

## 2022-06-16 PROCEDURE — 99232 SBSQ HOSP IP/OBS MODERATE 35: CPT | Mod: GC

## 2022-06-16 PROCEDURE — 71047 X-RAY EXAM CHEST 3 VIEWS: CPT | Mod: 26

## 2022-06-16 RX ORDER — CEFTRIAXONE 500 MG/1
1000 INJECTION, POWDER, FOR SOLUTION INTRAMUSCULAR; INTRAVENOUS EVERY 24 HOURS
Refills: 0 | Status: DISCONTINUED | OUTPATIENT
Start: 2022-06-16 | End: 2022-06-18

## 2022-06-16 RX ORDER — ATORVASTATIN CALCIUM 80 MG/1
80 TABLET, FILM COATED ORAL AT BEDTIME
Refills: 0 | Status: DISCONTINUED | OUTPATIENT
Start: 2022-06-15 | End: 2022-06-18

## 2022-06-16 RX ORDER — APIXABAN 2.5 MG/1
5 TABLET, FILM COATED ORAL EVERY 12 HOURS
Refills: 0 | Status: DISCONTINUED | OUTPATIENT
Start: 2022-06-15 | End: 2022-06-18

## 2022-06-16 RX ORDER — LANOLIN ALCOHOL/MO/W.PET/CERES
5 CREAM (GRAM) TOPICAL AT BEDTIME
Refills: 0 | Status: DISCONTINUED | OUTPATIENT
Start: 2022-06-16 | End: 2022-06-18

## 2022-06-16 RX ORDER — AMIODARONE HYDROCHLORIDE 400 MG/1
100 TABLET ORAL DAILY
Refills: 0 | Status: DISCONTINUED | OUTPATIENT
Start: 2022-06-15 | End: 2022-06-18

## 2022-06-16 RX ORDER — FOLIC ACID 0.8 MG
1 TABLET ORAL DAILY
Refills: 0 | Status: DISCONTINUED | OUTPATIENT
Start: 2022-06-16 | End: 2022-06-18

## 2022-06-16 RX ORDER — METHOTREXATE 2.5 MG/1
20 TABLET ORAL
Refills: 0 | Status: DISCONTINUED | OUTPATIENT
Start: 2022-06-21 | End: 2022-06-18

## 2022-06-16 RX ORDER — OXYCODONE HYDROCHLORIDE 5 MG/1
10 TABLET ORAL EVERY 8 HOURS
Refills: 0 | Status: DISCONTINUED | OUTPATIENT
Start: 2022-06-15 | End: 2022-06-18

## 2022-06-16 RX ORDER — ASPIRIN/CALCIUM CARB/MAGNESIUM 324 MG
81 TABLET ORAL DAILY
Refills: 0 | Status: DISCONTINUED | OUTPATIENT
Start: 2022-06-15 | End: 2022-06-18

## 2022-06-16 RX ORDER — ONDANSETRON 8 MG/1
4 TABLET, FILM COATED ORAL EVERY 8 HOURS
Refills: 0 | Status: DISCONTINUED | OUTPATIENT
Start: 2022-06-16 | End: 2022-06-18

## 2022-06-16 RX ORDER — LEVETIRACETAM 250 MG/1
500 TABLET, FILM COATED ORAL
Refills: 0 | Status: DISCONTINUED | OUTPATIENT
Start: 2022-06-15 | End: 2022-06-18

## 2022-06-16 RX ORDER — METOPROLOL TARTRATE 50 MG
50 TABLET ORAL
Refills: 0 | Status: DISCONTINUED | OUTPATIENT
Start: 2022-06-15 | End: 2022-06-18

## 2022-06-16 RX ADMIN — OXYCODONE HYDROCHLORIDE 10 MILLIGRAM(S): 5 TABLET ORAL at 16:15

## 2022-06-16 RX ADMIN — OXYCODONE HYDROCHLORIDE 10 MILLIGRAM(S): 5 TABLET ORAL at 21:18

## 2022-06-16 RX ADMIN — ATORVASTATIN CALCIUM 80 MILLIGRAM(S): 80 TABLET, FILM COATED ORAL at 21:22

## 2022-06-16 RX ADMIN — APIXABAN 5 MILLIGRAM(S): 2.5 TABLET, FILM COATED ORAL at 09:49

## 2022-06-16 RX ADMIN — Medication 81 MILLIGRAM(S): at 09:49

## 2022-06-16 RX ADMIN — LEVETIRACETAM 500 MILLIGRAM(S): 250 TABLET, FILM COATED ORAL at 09:49

## 2022-06-16 RX ADMIN — Medication 5 MILLIGRAM(S): at 21:18

## 2022-06-16 RX ADMIN — LEVETIRACETAM 500 MILLIGRAM(S): 250 TABLET, FILM COATED ORAL at 21:22

## 2022-06-16 RX ADMIN — Medication 50 MILLIGRAM(S): at 09:49

## 2022-06-16 RX ADMIN — Medication 650 MILLIGRAM(S): at 00:44

## 2022-06-16 RX ADMIN — AMIODARONE HYDROCHLORIDE 100 MILLIGRAM(S): 400 TABLET ORAL at 09:48

## 2022-06-16 RX ADMIN — Medication 650 MILLIGRAM(S): at 09:51

## 2022-06-16 RX ADMIN — OXYCODONE HYDROCHLORIDE 10 MILLIGRAM(S): 5 TABLET ORAL at 06:51

## 2022-06-16 RX ADMIN — Medication 50 MILLIGRAM(S): at 21:22

## 2022-06-16 RX ADMIN — APIXABAN 5 MILLIGRAM(S): 2.5 TABLET, FILM COATED ORAL at 21:22

## 2022-06-16 RX ADMIN — CEFTRIAXONE 100 MILLIGRAM(S): 500 INJECTION, POWDER, FOR SOLUTION INTRAMUSCULAR; INTRAVENOUS at 09:49

## 2022-06-16 RX ADMIN — Medication 1 MILLIGRAM(S): at 09:48

## 2022-06-16 RX ADMIN — Medication 650 MILLIGRAM(S): at 10:20

## 2022-06-16 RX ADMIN — OXYCODONE HYDROCHLORIDE 10 MILLIGRAM(S): 5 TABLET ORAL at 15:46

## 2022-06-16 NOTE — PROGRESS NOTE ADULT - ATTENDING COMMENTS
Resident history as documented below reviewed. Please see resident progress note for full details regarding the service. Patient seen and examined at the bedside.     General: Awake and alert, cooperative with exam. No acute distress.   Cardiology: Normal S1, S2. No murmurs. RRR.   Respiratory: Lungs CTABL. No wheezes, rales, or rhonchi.   Gastrointestinal: + BS. Soft. NT. ND. No guarding, rigidity, or rebound tenderness.  Extremities: No peripheral edema bilaterally.  Neurological: A+Ox3. CN 2-12 intact. No FND. Normal speech. No facial droop.   Psychiatric: Normal affect. Normal mood.     Assessment   - Acute metabolic encephalopathy secondary to UTI   - History of Afib on eliquis, Psoriatic arthritis, CVA, Seizure, HTN, cataracts    Plan   - c/w Ceftriaxone    f/u UCx, BCx x 2; adjust abx based on sensitivities   - Pt's mental status significantly improved A+Ox3 (person, place, time) today   - D/C planning

## 2022-06-16 NOTE — PHYSICAL THERAPY INITIAL EVALUATION ADULT - ADDITIONAL COMMENTS
Patient poor historian, but reported (-) , minimal Community Ambulator with spouse (get "dizzy). Last admit in May 2022 with COVID, able to ambulate 25 feet with RW, went to [facility] (per EMR).

## 2022-06-16 NOTE — PROVIDER CONTACT NOTE (OTHER) - SITUATION
Spoke to Huong at MD office. Will notify PCP of admission to . Please fax DC note provider to [phone].

## 2022-06-16 NOTE — PHYSICAL THERAPY INITIAL EVALUATION ADULT - PERTINENT HX OF CURRENT PROBLEM, REHAB EVAL
76yo man with pmhx significant for Afib on eliquis, Psoriatic arthritis, CVA, Seizure, HTN, cataracts, presenting to [facility] from home for Fever, AMS. CT head (-) acute changes.

## 2022-06-16 NOTE — PHYSICAL THERAPY INITIAL EVALUATION ADULT - GENERAL OBSERVATIONS, REHAB EVAL
Patient received in bed on 2S, IV just finished and disconnected by RN. Patient alert and cooperative.

## 2022-06-17 ENCOUNTER — TRANSCRIPTION ENCOUNTER (OUTPATIENT)
Age: 77
End: 2022-06-17

## 2022-06-17 LAB
ALBUMIN SERPL ELPH-MCNC: 2.5 G/DL — LOW (ref 3.3–5)
ALP SERPL-CCNC: 68 U/L — SIGNIFICANT CHANGE UP (ref 40–120)
ALT FLD-CCNC: 19 U/L — SIGNIFICANT CHANGE UP (ref 12–78)
ANION GAP SERPL CALC-SCNC: 5 MMOL/L — SIGNIFICANT CHANGE UP (ref 5–17)
AST SERPL-CCNC: 11 U/L — LOW (ref 15–37)
BASOPHILS # BLD AUTO: 0.03 K/UL — SIGNIFICANT CHANGE UP (ref 0–0.2)
BASOPHILS NFR BLD AUTO: 0.2 % — SIGNIFICANT CHANGE UP (ref 0–2)
BILIRUB SERPL-MCNC: 1.4 MG/DL — HIGH (ref 0.2–1.2)
BUN SERPL-MCNC: 14 MG/DL — SIGNIFICANT CHANGE UP (ref 7–23)
CALCIUM SERPL-MCNC: 8.4 MG/DL — LOW (ref 8.5–10.1)
CHLORIDE SERPL-SCNC: 107 MMOL/L — SIGNIFICANT CHANGE UP (ref 96–108)
CO2 SERPL-SCNC: 26 MMOL/L — SIGNIFICANT CHANGE UP (ref 22–31)
CREAT SERPL-MCNC: 0.74 MG/DL — SIGNIFICANT CHANGE UP (ref 0.5–1.3)
EGFR: 92 ML/MIN/1.73M2 — SIGNIFICANT CHANGE UP
EOSINOPHIL # BLD AUTO: 0.2 K/UL — SIGNIFICANT CHANGE UP (ref 0–0.5)
EOSINOPHIL NFR BLD AUTO: 1.3 % — SIGNIFICANT CHANGE UP (ref 0–6)
GLUCOSE SERPL-MCNC: 95 MG/DL — SIGNIFICANT CHANGE UP (ref 70–99)
HCT VFR BLD CALC: 31.7 % — LOW (ref 39–50)
HGB BLD-MCNC: 10.5 G/DL — LOW (ref 13–17)
IMM GRANULOCYTES NFR BLD AUTO: 0.8 % — SIGNIFICANT CHANGE UP (ref 0–1.5)
LYMPHOCYTES # BLD AUTO: 1.09 K/UL — SIGNIFICANT CHANGE UP (ref 1–3.3)
LYMPHOCYTES # BLD AUTO: 7.3 % — LOW (ref 13–44)
MCHC RBC-ENTMCNC: 33.1 GM/DL — SIGNIFICANT CHANGE UP (ref 32–36)
MCHC RBC-ENTMCNC: 33.2 PG — SIGNIFICANT CHANGE UP (ref 27–34)
MCV RBC AUTO: 100.3 FL — HIGH (ref 80–100)
MONOCYTES # BLD AUTO: 1.19 K/UL — HIGH (ref 0–0.9)
MONOCYTES NFR BLD AUTO: 8 % — SIGNIFICANT CHANGE UP (ref 2–14)
NEUTROPHILS # BLD AUTO: 12.3 K/UL — HIGH (ref 1.8–7.4)
NEUTROPHILS NFR BLD AUTO: 82.4 % — HIGH (ref 43–77)
PLATELET # BLD AUTO: 116 K/UL — LOW (ref 150–400)
POTASSIUM SERPL-MCNC: 3.8 MMOL/L — SIGNIFICANT CHANGE UP (ref 3.5–5.3)
POTASSIUM SERPL-SCNC: 3.8 MMOL/L — SIGNIFICANT CHANGE UP (ref 3.5–5.3)
PROT SERPL-MCNC: 5.8 GM/DL — LOW (ref 6–8.3)
RBC # BLD: 3.16 M/UL — LOW (ref 4.2–5.8)
RBC # FLD: 13.8 % — SIGNIFICANT CHANGE UP (ref 10.3–14.5)
SODIUM SERPL-SCNC: 138 MMOL/L — SIGNIFICANT CHANGE UP (ref 135–145)
WBC # BLD: 14.93 K/UL — HIGH (ref 3.8–10.5)
WBC # FLD AUTO: 14.93 K/UL — HIGH (ref 3.8–10.5)

## 2022-06-17 PROCEDURE — 99232 SBSQ HOSP IP/OBS MODERATE 35: CPT | Mod: GC

## 2022-06-17 RX ADMIN — OXYCODONE HYDROCHLORIDE 10 MILLIGRAM(S): 5 TABLET ORAL at 23:02

## 2022-06-17 RX ADMIN — APIXABAN 5 MILLIGRAM(S): 2.5 TABLET, FILM COATED ORAL at 22:25

## 2022-06-17 RX ADMIN — OXYCODONE HYDROCHLORIDE 10 MILLIGRAM(S): 5 TABLET ORAL at 14:58

## 2022-06-17 RX ADMIN — Medication 5 MILLIGRAM(S): at 22:24

## 2022-06-17 RX ADMIN — CEFTRIAXONE 100 MILLIGRAM(S): 500 INJECTION, POWDER, FOR SOLUTION INTRAMUSCULAR; INTRAVENOUS at 11:45

## 2022-06-17 RX ADMIN — AMIODARONE HYDROCHLORIDE 100 MILLIGRAM(S): 400 TABLET ORAL at 10:31

## 2022-06-17 RX ADMIN — OXYCODONE HYDROCHLORIDE 10 MILLIGRAM(S): 5 TABLET ORAL at 16:30

## 2022-06-17 RX ADMIN — LEVETIRACETAM 500 MILLIGRAM(S): 250 TABLET, FILM COATED ORAL at 22:25

## 2022-06-17 RX ADMIN — ATORVASTATIN CALCIUM 80 MILLIGRAM(S): 80 TABLET, FILM COATED ORAL at 22:24

## 2022-06-17 RX ADMIN — Medication 50 MILLIGRAM(S): at 10:31

## 2022-06-17 RX ADMIN — LEVETIRACETAM 500 MILLIGRAM(S): 250 TABLET, FILM COATED ORAL at 10:30

## 2022-06-17 RX ADMIN — Medication 1 MILLIGRAM(S): at 10:31

## 2022-06-17 RX ADMIN — APIXABAN 5 MILLIGRAM(S): 2.5 TABLET, FILM COATED ORAL at 10:30

## 2022-06-17 RX ADMIN — Medication 50 MILLIGRAM(S): at 22:25

## 2022-06-17 RX ADMIN — OXYCODONE HYDROCHLORIDE 10 MILLIGRAM(S): 5 TABLET ORAL at 22:22

## 2022-06-17 NOTE — PROGRESS NOTE ADULT - ASSESSMENT
76 yo man with pmhx significant for Afib on eliquis, Psoriatic arthritis, CVA, Seizure, HTN, cataracts, presenting to [facility] from home for Fever. Patient found to be Febrile to 102.9, Tachycardic to 126, saturating 90% on RA. Patient is admitted for:     #Acute Infectious Encephalopathy 2/2 Acute Sepsis in setting of UTI  -In ED, patient AAOx1 as per wife baseline is AAOx3.  tachycardic 126, [id number], with WBC 21.56, meeting SIRS criteria, UA notable for+nitrites, leuk esterase, TNTC bacteria  - CT head, CXR showing no acute processes  - Now s/p 2200ml fluid in ED and 1x dose ceftriaxone  -C/w Ceftriaxone IV 1g daily  -F/u urine cx  -F/u blood cx  -Trend vitals, CBC    #Paroxysmal Afib  -Currently stable  -C/w amiodarone 100mg daily   -C/w eliquis 5mg BID  -C/wMetoprolol tartrate 50mg BID    #psoriatic arthiritis  - C/w bethamethsone .05 cream  - C/ wmethotraxate 20mg (8 tablets of 2.5mg) every tuesday  - C/w folic acid 1mg daily  - C/w oxycodone 10mg Q8    #hx of CVA  #hx of seizure   -patient with seizure disorder as complication of CVA  -C/w ASA 81mg seizure  -C/w keppra 500mg BID      #HLD  -C/w atorvastatin 80mg QHS    #Diet  -regular    #VTE ppx  -C/w eliquis 5mg BID    #code status  -FULL CODE    D/w Dr. Thompson
78 yo man with pmhx significant for Afib on eliquis, Psoriatic arthritis, CVA, Seizure, HTN, cataracts, presenting to [facility] from home for Fever. Patient found to be Febrile to 102.9, Tachycardic to 126, saturating 90% on RA. Patient is admitted for:     #Acute Infectious Encephalopathy 2/2 Acute Sepsis in setting of UTI, Pneumonia ruled out  - In ED, patient AAOx1 as per wife baseline is AAOx3.  tachycardic 126, [id number], with WBC 21.56, meeting SIRS criteria, UA notable for+nitrites, leuk esterase, TNTC bacteria  - Pt now AAOx3, improved, blood cx WNL however UA + for E Coli  - CT head, CXR showing no acute processes  - No suspicion of pneumonia as CXR A&P and decubitus views show no acute processes  - Now s/p 2200ml fluid in ED and 1x dose ceftriaxone  - C/w Ceftriaxone IV 1g daily (day 2). Awaiting sensitivities.  - Trend vitals, CBC    #Paroxysmal Afib  - Currently stable  - C/w amiodarone 100mg daily   - C/w eliquis 5mg BID  - C/w Metoprolol tartrate 50mg BID    #Psoriatic Arthiritis  - C/w bethamethsone .05 cream  - C/ w methotraxate 20mg (8 tablets of 2.5mg) every tuesday  - C/w folic acid 1mg daily  - C/w oxycodone 10mg Q8    #hx of CVA  #hx of seizure   -patient with seizure disorder as complication of CVA  -C/w ASA 81mg seizure  -C/w keppra 500mg BID      #HLD  -C/w atorvastatin 80mg QHS    #Diet  -regular    #VTE ppx  -C/w eliquis 5mg BID    #code status  -FULL CODE    D/w Dr. Thompson

## 2022-06-17 NOTE — DISCHARGE NOTE NURSING/CASE MANAGEMENT/SOCIAL WORK - PATIENT PORTAL LINK FT
You can access the FollowMyHealth Patient Portal offered by [facility] by registering at the following website: [email or web address]. By joining [facility]’s FollowMyHealth portal, you will also be able to view your health information using other applications (apps) compatible with our system.

## 2022-06-17 NOTE — DISCHARGE NOTE PROVIDER - HOSPITAL COURSE
Pt 76yo man with pmhx significant for Afib on eliquis, Psoriatic arthritis, CVA, Seizure, HTN, cataracts, with CC of fever and AMS. was at baseline until two days before admission, when pt was found by wife to be feverish and confused in the evening, no alleviating/aggravating factors but associated with urinary incontinence, weakness, and poor ambulation, prompting wife to contact EMS who brought pt to [facility]. In the ED, pt was found to be AAOx1, confused and septic with T: 102.9 /82 . Labs were notable for WBCs at 21.56, lactate WNL, UCx was + for E. Coli. He was give fluids and rocephin and admitted to the floor. Pt improved on abx.  Today pt is AAOx3, likely at baseline mental status, and is afebrile, hemodynamically stable. He will be discharged home for completion of oral abx course and f/u with his primary care physician.

## 2022-06-17 NOTE — DISCHARGE NOTE NURSING/CASE MANAGEMENT/SOCIAL WORK - NSDCPEFALRISK_GEN_ALL_CORE
For information on Fall & Injury Prevention, visit: [email or web address] OR  [email or web address] OR  https://www.cdc.gov/steadi/patient.html

## 2022-06-18 ENCOUNTER — TRANSCRIPTION ENCOUNTER (OUTPATIENT)
Age: 77
End: 2022-06-18

## 2022-06-18 VITALS
SYSTOLIC BLOOD PRESSURE: 101 MMHG | OXYGEN SATURATION: 94 % | HEART RATE: 83 BPM | RESPIRATION RATE: 17 BRPM | DIASTOLIC BLOOD PRESSURE: 66 MMHG | TEMPERATURE: 97 F

## 2022-06-18 LAB
-  AMIKACIN: SIGNIFICANT CHANGE UP
-  AMOXICILLIN/CLAVULANIC ACID: SIGNIFICANT CHANGE UP
-  AMPICILLIN/SULBACTAM: SIGNIFICANT CHANGE UP
-  AMPICILLIN: SIGNIFICANT CHANGE UP
-  AZTREONAM: SIGNIFICANT CHANGE UP
-  CEFAZOLIN: SIGNIFICANT CHANGE UP
-  CEFEPIME: SIGNIFICANT CHANGE UP
-  CEFOXITIN: SIGNIFICANT CHANGE UP
-  CEFTRIAXONE: SIGNIFICANT CHANGE UP
-  CIPROFLOXACIN: SIGNIFICANT CHANGE UP
-  ERTAPENEM: SIGNIFICANT CHANGE UP
-  GENTAMICIN: SIGNIFICANT CHANGE UP
-  IMIPENEM: SIGNIFICANT CHANGE UP
-  LEVOFLOXACIN: SIGNIFICANT CHANGE UP
-  MEROPENEM: SIGNIFICANT CHANGE UP
-  NITROFURANTOIN: SIGNIFICANT CHANGE UP
-  PIPERACILLIN/TAZOBACTAM: SIGNIFICANT CHANGE UP
-  TIGECYCLINE: SIGNIFICANT CHANGE UP
-  TOBRAMYCIN: SIGNIFICANT CHANGE UP
-  TRIMETHOPRIM/SULFAMETHOXAZOLE: SIGNIFICANT CHANGE UP
ALBUMIN SERPL ELPH-MCNC: 2.4 G/DL — LOW (ref 3.3–5)
ALP SERPL-CCNC: 63 U/L — SIGNIFICANT CHANGE UP (ref 40–120)
ALT FLD-CCNC: 29 U/L — SIGNIFICANT CHANGE UP (ref 12–78)
ANION GAP SERPL CALC-SCNC: 3 MMOL/L — LOW (ref 5–17)
AST SERPL-CCNC: 14 U/L — LOW (ref 15–37)
BASOPHILS # BLD AUTO: 0.02 K/UL — SIGNIFICANT CHANGE UP (ref 0–0.2)
BASOPHILS NFR BLD AUTO: 0.2 % — SIGNIFICANT CHANGE UP (ref 0–2)
BILIRUB SERPL-MCNC: 0.7 MG/DL — SIGNIFICANT CHANGE UP (ref 0.2–1.2)
BUN SERPL-MCNC: 16 MG/DL — SIGNIFICANT CHANGE UP (ref 7–23)
CALCIUM SERPL-MCNC: 8.9 MG/DL — SIGNIFICANT CHANGE UP (ref 8.5–10.1)
CHLORIDE SERPL-SCNC: 109 MMOL/L — HIGH (ref 96–108)
CO2 SERPL-SCNC: 28 MMOL/L — SIGNIFICANT CHANGE UP (ref 22–31)
CREAT SERPL-MCNC: 0.75 MG/DL — SIGNIFICANT CHANGE UP (ref 0.5–1.3)
CULTURE RESULTS: SIGNIFICANT CHANGE UP
EGFR: 92 ML/MIN/1.73M2 — SIGNIFICANT CHANGE UP
EOSINOPHIL # BLD AUTO: 0.33 K/UL — SIGNIFICANT CHANGE UP (ref 0–0.5)
EOSINOPHIL NFR BLD AUTO: 3.9 % — SIGNIFICANT CHANGE UP (ref 0–6)
GLUCOSE SERPL-MCNC: 107 MG/DL — HIGH (ref 70–99)
HCT VFR BLD CALC: 32.5 % — LOW (ref 39–50)
HGB BLD-MCNC: 10.6 G/DL — LOW (ref 13–17)
IMM GRANULOCYTES NFR BLD AUTO: 0.4 % — SIGNIFICANT CHANGE UP (ref 0–1.5)
LYMPHOCYTES # BLD AUTO: 0.82 K/UL — LOW (ref 1–3.3)
LYMPHOCYTES # BLD AUTO: 9.8 % — LOW (ref 13–44)
MCHC RBC-ENTMCNC: 32.6 GM/DL — SIGNIFICANT CHANGE UP (ref 32–36)
MCHC RBC-ENTMCNC: 32.7 PG — SIGNIFICANT CHANGE UP (ref 27–34)
MCV RBC AUTO: 100.3 FL — HIGH (ref 80–100)
METHOD TYPE: SIGNIFICANT CHANGE UP
MONOCYTES # BLD AUTO: 0.82 K/UL — SIGNIFICANT CHANGE UP (ref 0–0.9)
MONOCYTES NFR BLD AUTO: 9.8 % — SIGNIFICANT CHANGE UP (ref 2–14)
NEUTROPHILS # BLD AUTO: 6.35 K/UL — SIGNIFICANT CHANGE UP (ref 1.8–7.4)
NEUTROPHILS NFR BLD AUTO: 75.9 % — SIGNIFICANT CHANGE UP (ref 43–77)
ORGANISM # SPEC MICROSCOPIC CNT: SIGNIFICANT CHANGE UP
ORGANISM # SPEC MICROSCOPIC CNT: SIGNIFICANT CHANGE UP
PLATELET # BLD AUTO: 118 K/UL — LOW (ref 150–400)
POTASSIUM SERPL-MCNC: 4.2 MMOL/L — SIGNIFICANT CHANGE UP (ref 3.5–5.3)
POTASSIUM SERPL-SCNC: 4.2 MMOL/L — SIGNIFICANT CHANGE UP (ref 3.5–5.3)
PROT SERPL-MCNC: 5.6 GM/DL — LOW (ref 6–8.3)
RBC # BLD: 3.24 M/UL — LOW (ref 4.2–5.8)
RBC # FLD: 13.5 % — SIGNIFICANT CHANGE UP (ref 10.3–14.5)
SODIUM SERPL-SCNC: 140 MMOL/L — SIGNIFICANT CHANGE UP (ref 135–145)
SPECIMEN SOURCE: SIGNIFICANT CHANGE UP
WBC # BLD: 8.37 K/UL — SIGNIFICANT CHANGE UP (ref 3.8–10.5)
WBC # FLD AUTO: 8.37 K/UL — SIGNIFICANT CHANGE UP (ref 3.8–10.5)

## 2022-06-18 PROCEDURE — 99238 HOSP IP/OBS DSCHRG MGMT 30/<: CPT

## 2022-06-18 RX ADMIN — Medication 1 MILLIGRAM(S): at 09:26

## 2022-06-18 RX ADMIN — AMIODARONE HYDROCHLORIDE 100 MILLIGRAM(S): 400 TABLET ORAL at 09:26

## 2022-06-18 RX ADMIN — APIXABAN 5 MILLIGRAM(S): 2.5 TABLET, FILM COATED ORAL at 09:26

## 2022-06-18 RX ADMIN — CEFTRIAXONE 100 MILLIGRAM(S): 500 INJECTION, POWDER, FOR SOLUTION INTRAMUSCULAR; INTRAVENOUS at 09:26

## 2022-06-18 RX ADMIN — OXYCODONE HYDROCHLORIDE 10 MILLIGRAM(S): 5 TABLET ORAL at 14:00

## 2022-06-18 RX ADMIN — Medication 50 MILLIGRAM(S): at 09:25

## 2022-06-18 RX ADMIN — LEVETIRACETAM 500 MILLIGRAM(S): 250 TABLET, FILM COATED ORAL at 09:26

## 2022-06-18 RX ADMIN — Medication 81 MILLIGRAM(S): at 09:25

## 2022-06-18 RX ADMIN — OXYCODONE HYDROCHLORIDE 10 MILLIGRAM(S): 5 TABLET ORAL at 06:37

## 2022-06-18 RX ADMIN — OXYCODONE HYDROCHLORIDE 10 MILLIGRAM(S): 5 TABLET ORAL at 13:33

## 2022-06-18 NOTE — DISCHARGE NOTE PROVIDER - NSDCCPCAREPLAN_GEN_ALL_CORE_FT
PRINCIPAL DISCHARGE DIAGNOSIS  Diagnosis: Acute UTI  Assessment and Plan of Treatment: You can in with fevers and confusion. In the emergency room, you were found to be septic with fever, high blood pressure and fast heart rate. Your white blood cell count was high, and your urine culture grew E Coli. You chest x-ray was unremarkable. You were given fluids and the IV antibiotic ceftriaxone and admitted to the hospital floor. You improved on IV antibiotics, and today your mental status has improved, your symptoms have improved, and you are stable with no fever, ready to go home with home care.  - Please complete 5 days of oral antibiotics (Cefdinir 300 mg, twice a day for 5 days.)  - Please make an appointment with your primary care doctor ASAP fo for follow up including a prostate check and possible prostate medicines.  - Remember some of the tips to help your condition. Keep good hygeine in the area, don't hold in your pee and go urinate as often as you need and continue to stay hydrated so you make the urine you need to flush out your system.  Thank you!       PRINCIPAL DISCHARGE DIAGNOSIS  Diagnosis: Acute UTI  Assessment and Plan of Treatment: You came in with fevers and confusion. In the emergency room, you were found to be septic with fever, high blood pressure and fast heart rate. Your white blood cell count was high, and your urine culture grew E Coli. You chest x-ray was unremarkable. You were given fluids and the IV antibiotic ceftriaxone and admitted to the hospital floor. You improved on IV antibiotics, and today your mental status has improved, your symptoms have improved, and you are stable with no fever, ready to go home with home care.  - Please complete 5 days of oral antibiotics (Cefdinir 300 mg, twice a day for 5 days.)  - Please make an appointment with your primary care doctor ASAP fo for follow up including a prostate check and possible prostate medicines.  - Remember some of the tips to help your condition. Keep good hygeine in the area, don't hold in your pee and go urinate as often as you need and continue to stay hydrated so you make the urine you need to flush out your system.  Thank you!

## 2022-06-18 NOTE — DISCHARGE NOTE PROVIDER - NSDCHHATTENDCERT_GEN_ALL_CORE
My signature below certifies that the above stated patient is homebound and upon completion of the Face-To-Face encounter, has the need for intermittent skilled nursing, physical therapy and/or speech or occupational therapy services in their home for their current diagnosis as outlined in their initial plan of care. These services will continue to be monitored by myself or another physician.
My signature below certifies that the above stated patient is homebound and upon completion of the Face-To-Face encounter, has the need for intermittent skilled nursing, physical therapy and/or speech or occupational therapy services in their home for their current diagnosis as outlined in their initial plan of care. These services will continue to be monitored by myself or another physician.

## 2022-06-18 NOTE — DISCHARGE NOTE PROVIDER - NSDCMRMEDTOKEN_GEN_ALL_CORE_FT
amiodarone 100 mg oral tablet: 1 tab(s) orally once a day  Aspirin Enteric Coated 81 mg oral delayed release tablet: 1 tab(s) orally once a day  atorvastatin 80 mg oral tablet: 1 tab(s) orally once a day (at bedtime)  betamethasone dipropionate 0.05% topical cream: Apply topically to affected area of plaque psoriasis 2 times a day, As Needed  cefdinir 300 mg oral capsule: 1 cap(s) orally 2 times a day   Eliquis 5 mg oral tablet: 1 tab(s) orally 2 times a day  folic acid 1 mg oral tablet: 1 tab(s) orally once a day  levETIRAcetam 250 mg oral tablet: 2 tab(s) orally 2 times a day  Melatonin 5 mg oral tablet: 1 tab(s) orally once a day (at bedtime), As Needed  methotrexate 2.5 mg oral tablet: 8 tab(s) orally once a week on tuesdays  Metoprolol Tartrate 50 mg oral tablet: 1 tab(s) orally 2 times a day  oxyCODONE 10 mg oral tablet: 1 tab(s) orally every 7 hours  Tylenol 500 mg oral tablet: 2 tab(s) orally every 6 hours, As Needed  
amiodarone 100 mg oral tablet: 1 tab(s) orally once a day  Aspirin Enteric Coated 81 mg oral delayed release tablet: 1 tab(s) orally once a day  atorvastatin 80 mg oral tablet: 1 tab(s) orally once a day (at bedtime)  betamethasone dipropionate 0.05% topical cream: Apply topically to affected area of plaque psoriasis 2 times a day, As Needed  Eliquis 5 mg oral tablet: 1 tab(s) orally 2 times a day  folic acid 1 mg oral tablet: 1 tab(s) orally once a day  levETIRAcetam 250 mg oral tablet: 2 tab(s) orally 2 times a day  Melatonin 5 mg oral tablet: 1 tab(s) orally once a day (at bedtime), As Needed  methotrexate 2.5 mg oral tablet: 8 tab(s) orally once a week on tuesdays  Metoprolol Tartrate 50 mg oral tablet: 1 tab(s) orally 2 times a day  oxyCODONE 10 mg oral tablet: 1 tab(s) orally every 7 hours  Tylenol 500 mg oral tablet: 2 tab(s) orally every 6 hours, As Needed

## 2022-06-18 NOTE — PATIENT PROFILE ADULT - FALL HARM RISK - HARM RISK INTERVENTIONS

## 2022-06-18 NOTE — DISCHARGE NOTE PROVIDER - NSDCHHCONTACT_GEN_ALL_CORE_FT
As certified below, I, or a nurse practitioner or physician assistant working with me, had a face-to-face encounter that meets the physician face-to-face encounter requirements.
As certified below, I, or a nurse practitioner or physician assistant working with me, had a face-to-face encounter that meets the physician face-to-face encounter requirements.

## 2022-06-18 NOTE — DISCHARGE NOTE PROVIDER - CARE PROVIDER_API CALL
[place] [address]  Phone: [phone]  Fax: [phone]  Established Patient  Follow Up Time:

## 2022-06-18 NOTE — DISCHARGE NOTE PROVIDER - HOSPITAL COURSE
Pt 76yo man with pmhx significant for Afib on eliquis, Psoriatic arthritis, CVA, Seizure, HTN, cataracts, with CC of fever and AMS. was at baseline until two days before admission, when pt was found by wife to be feverish and confused in the evening, no alleviating/aggravating factors but associated with urinary incontinence, weakness, and poor ambulation, prompting wife to contact EMS who brought pt to [facility]. In the ED, pt was found to be AAOx1, confused and septic with T: 102.9 /82 . Labs were notable for WBCs at 21.56, lactate WNL, UCx was + for E. Coli. Chest X-ray was WNL. He was give fluids and rocephin and admitted to the floor. Pt improved on Ceftriaxone.  Today pt is AAOx3, likely at baseline mental status, improved symptomatically and is afebrile, hemodynamically stable. He will be discharged to home with home care for completion of oral Cefdinir course and f/u with his primary care physician for medication adjustment.     Subjective: No acute overnight events. Pt is feeling better today and asking to go home. Plan was reviewed pt in person and separately w/wife over telephone. All issues and questions addressed. Pt denies f/c, n/v, cp, palpitations. ROS as stated above.    Vital Signs Last 24 Hrs  T(C): 36.7 (18 Jun 2022 07:06), Max: 36.9 (17 Jun 2022 15:02)  T(F): 98 (18 Jun 2022 07:06), Max: 98.4 (17 Jun 2022 15:02)  HR: 82 (18 Jun 2022 07:06) (82 - 91)  BP: 129/68 (18 Jun 2022 07:06) (104/56 - 129/68)  BP(mean): --  RR: 18 (18 Jun 2022 07:06) (18 - 18)  SpO2: 96% (18 Jun 2022 07:06) (96% - 97%)     GENERAL: NAD, lying in bed comfortably  HEAD:  Atraumatic, Normocephalic  EYES: EOMI, PERRLA, conjunctiva and sclera clear  ENT: Moist mucous membranes  NECK: Supple, No JVD  CHEST/LUNG: Clear to auscultation bilaterally; No rales, rhonchi, wheezing, or rubs. Unlabored respirations  HEART: Regular rate and rhythm; No murmurs, rubs, or gallops  ABDOMEN: Bowel sounds present; Soft, Nontender, Nondistended. No hepatomegally  EXTREMITIES:  No clubbing, cyanosis, or edema  NERVOUS SYSTEM:  Alert & Oriented X3, no new deficits observed  MSK: Normal muscle tone, no atrophy, no rigidity, no contractions  SKIN: No new rashes or lesions    EKG/RADIOLOGY  < from: Xray Chest Decub 3 Views, Bilat (06.16.22 @ 16:27) >    INTERPRETATION:  Clinical history: 77-year-old male, rule out pneumonia.    Bilateral decubitus views of the chest are correlated with an APview.    FINDINGS: Normal cardiac silhouette and normal pulmonary vasculature with   no consolidation, pneumothorax, acute osseous finding, free-flowing or   other effusion.    IMPRESSION:  No acute findings, if there is continued clinical concern follow-up CT   can be ordered  < from: Xray Chest 1 View- PORTABLE-Urgent (06.15.22 @ 18:02) >    FINDINGS:  CATHETERS AND TUBES: None    PULMONARY: The visualized lungs are clear of airspace consolidations or   effusions.   No pneumothorax.    HEART/VASCULAR: The  heart is enlarged in transverse diameter.     BONES: Visualized osseous structures are intact.    IMPRESSION: Cardiomegaly.  No radiographic evidence of active chest disease.  Lateral or bilateral decubitus radiographs recommended to exclude   posterior lung base pathology.    --- End of Report ---    < end of copied text >           Pt 78yo man with pmhx significant for Afib on eliquis, Psoriatic arthritis, CVA, Seizure, HTN, cataracts, with CC of fever and AMS. was at baseline until two days before admission, when pt was found by wife to be feverish and confused in the evening, no alleviating/aggravating factors but associated with urinary incontinence, weakness, and poor ambulation, prompting wife to contact EMS who brought pt to [facility]. In the ED, pt was found to be AAOx1, confused and septic with T: 102.9 /82 . Labs were notable for WBCs at 21.56, lactate WNL, UCx was + for E. Coli. Chest X-ray was WNL. He was given fluids and rocephin and admitted to the floor. Pt improved on Ceftriaxone.  Today pt is AAOx3, likely at baseline mental status, improved symptomatically and is afebrile, hemodynamically stable. He will be discharged to home with home care for completion of oral Cefdinir course and f/u with his primary care physician for medication adjustment.     Subjective: No acute overnight events. Pt is feeling better today and asking to go home. Plan was reviewed pt in person and separately w/wife over telephone. All issues and questions addressed. Pt denies f/c, n/v, cp, palpitations. ROS as stated above.    Vital Signs Last 24 Hrs  T(C): 36.7 (18 Jun 2022 07:06), Max: 36.9 (17 Jun 2022 15:02)  T(F): 98 (18 Jun 2022 07:06), Max: 98.4 (17 Jun 2022 15:02)  HR: 82 (18 Jun 2022 07:06) (82 - 91)  BP: 129/68 (18 Jun 2022 07:06) (104/56 - 129/68)  BP(mean): --  RR: 18 (18 Jun 2022 07:06) (18 - 18)  SpO2: 96% (18 Jun 2022 07:06) (96% - 97%)     GENERAL: NAD, lying in bed comfortably  HEAD:  Atraumatic, Normocephalic  EYES: EOMI, PERRLA, conjunctiva and sclera clear  ENT: Moist mucous membranes  NECK: Supple, No JVD  CHEST/LUNG: Clear to auscultation bilaterally; No rales, rhonchi, wheezing, or rubs. Unlabored respirations  HEART: Regular rate and rhythm; No murmurs, rubs, or gallops  ABDOMEN: Bowel sounds present; Soft, Nontender, Nondistended. No hepatomegally  EXTREMITIES:  No clubbing, cyanosis, or edema  NERVOUS SYSTEM:  Alert & Oriented X3, no new deficits observed  MSK: Normal muscle tone, no atrophy, no rigidity, no contractions  SKIN: No new rashes or lesions    EKG/RADIOLOGY  < from: Xray Chest Decub 3 Views, Bilat (06.16.22 @ 16:27) >    INTERPRETATION:  Clinical history: 77-year-old male, rule out pneumonia.    Bilateral decubitus views of the chest are correlated with an APview.    FINDINGS: Normal cardiac silhouette and normal pulmonary vasculature with   no consolidation, pneumothorax, acute osseous finding, free-flowing or   other effusion.    IMPRESSION:  No acute findings, if there is continued clinical concern follow-up CT   can be ordered  < from: Xray Chest 1 View- PORTABLE-Urgent (06.15.22 @ 18:02) >    FINDINGS:  CATHETERS AND TUBES: None    PULMONARY: The visualized lungs are clear of airspace consolidations or   effusions.   No pneumothorax.    HEART/VASCULAR: The  heart is enlarged in transverse diameter.     BONES: Visualized osseous structures are intact.    IMPRESSION: Cardiomegaly.  No radiographic evidence of active chest disease.  Lateral or bilateral decubitus radiographs recommended to exclude   posterior lung base pathology.    --- End of Report ---    < end of copied text >

## 2022-06-19 RX ORDER — CEFDINIR 250 MG/5ML
1 POWDER, FOR SUSPENSION ORAL
Qty: 10 | Refills: 0
Start: 2022-06-19 | End: 2022-06-23

## 2022-06-21 LAB
CULTURE RESULTS: SIGNIFICANT CHANGE UP
CULTURE RESULTS: SIGNIFICANT CHANGE UP
LEVETIRACETAM SERPL-MCNC: 3.6 UG/ML — LOW (ref 10–40)
SPECIMEN SOURCE: SIGNIFICANT CHANGE UP
SPECIMEN SOURCE: SIGNIFICANT CHANGE UP

## 2022-06-23 DIAGNOSIS — I51.7 CARDIOMEGALY: ICD-10-CM

## 2022-06-23 DIAGNOSIS — I10 ESSENTIAL (PRIMARY) HYPERTENSION: ICD-10-CM

## 2022-06-23 DIAGNOSIS — Z98.1 ARTHRODESIS STATUS: ICD-10-CM

## 2022-06-23 DIAGNOSIS — Z87.440 PERSONAL HISTORY OF URINARY (TRACT) INFECTIONS: ICD-10-CM

## 2022-06-23 DIAGNOSIS — Z79.01 LONG TERM (CURRENT) USE OF ANTICOAGULANTS: ICD-10-CM

## 2022-06-23 DIAGNOSIS — B96.20 UNSPECIFIED ESCHERICHIA COLI [E. COLI] AS THE CAUSE OF DISEASES CLASSIFIED ELSEWHERE: ICD-10-CM

## 2022-06-23 DIAGNOSIS — Z86.16 PERSONAL HISTORY OF COVID-19: ICD-10-CM

## 2022-06-23 DIAGNOSIS — R26.2 DIFFICULTY IN WALKING, NOT ELSEWHERE CLASSIFIED: ICD-10-CM

## 2022-06-23 DIAGNOSIS — R32 UNSPECIFIED URINARY INCONTINENCE: ICD-10-CM

## 2022-06-23 DIAGNOSIS — I69.398 OTHER SEQUELAE OF CEREBRAL INFARCTION: ICD-10-CM

## 2022-06-23 DIAGNOSIS — N39.0 URINARY TRACT INFECTION, SITE NOT SPECIFIED: ICD-10-CM

## 2022-06-23 DIAGNOSIS — L40.50 ARTHROPATHIC PSORIASIS, UNSPECIFIED: ICD-10-CM

## 2022-06-23 DIAGNOSIS — A41.9 SEPSIS, UNSPECIFIED ORGANISM: ICD-10-CM

## 2022-06-23 DIAGNOSIS — G40.909 EPILEPSY, UNSPECIFIED, NOT INTRACTABLE, WITHOUT STATUS EPILEPTICUS: ICD-10-CM

## 2022-06-23 DIAGNOSIS — G93.41 METABOLIC ENCEPHALOPATHY: ICD-10-CM

## 2022-06-23 DIAGNOSIS — Z96.651 PRESENCE OF RIGHT ARTIFICIAL KNEE JOINT: ICD-10-CM

## 2022-06-23 DIAGNOSIS — I48.0 PAROXYSMAL ATRIAL FIBRILLATION: ICD-10-CM

## 2022-12-01 ENCOUNTER — INPATIENT (INPATIENT)
Facility: HOSPITAL | Age: 77
LOS: 1 days | Discharge: HOME | DRG: 312 | End: 2022-12-03
Attending: FAMILY MEDICINE | Admitting: INTERNAL MEDICINE
Payer: MEDICARE

## 2022-12-01 VITALS
WEIGHT: 184.97 LBS | HEIGHT: 71 IN | HEART RATE: 129 BPM | DIASTOLIC BLOOD PRESSURE: 114 MMHG | OXYGEN SATURATION: 94 % | RESPIRATION RATE: 20 BRPM | SYSTOLIC BLOOD PRESSURE: 154 MMHG | TEMPERATURE: 100 F

## 2022-12-01 DIAGNOSIS — Z98.1 ARTHRODESIS STATUS: Chronic | ICD-10-CM

## 2022-12-01 DIAGNOSIS — Z96.651 PRESENCE OF RIGHT ARTIFICIAL KNEE JOINT: Chronic | ICD-10-CM

## 2022-12-01 DIAGNOSIS — Z98.49 CATARACT EXTRACTION STATUS, UNSPECIFIED EYE: Chronic | ICD-10-CM

## 2022-12-01 DIAGNOSIS — A41.9 SEPSIS, UNSPECIFIED ORGANISM: ICD-10-CM

## 2022-12-01 DIAGNOSIS — Z90.89 ACQUIRED ABSENCE OF OTHER ORGANS: Chronic | ICD-10-CM

## 2022-12-01 LAB
ALBUMIN SERPL ELPH-MCNC: 3.5 G/DL — SIGNIFICANT CHANGE UP (ref 3.3–5)
ALP SERPL-CCNC: 92 U/L — SIGNIFICANT CHANGE UP (ref 40–120)
ALT FLD-CCNC: 37 U/L — SIGNIFICANT CHANGE UP (ref 12–78)
ANION GAP SERPL CALC-SCNC: 4 MMOL/L — LOW (ref 5–17)
APPEARANCE UR: CLEAR — SIGNIFICANT CHANGE UP
APTT BLD: 30.5 SEC — SIGNIFICANT CHANGE UP (ref 27.5–35.5)
AST SERPL-CCNC: 21 U/L — SIGNIFICANT CHANGE UP (ref 15–37)
BASOPHILS # BLD AUTO: 0.02 K/UL — SIGNIFICANT CHANGE UP (ref 0–0.2)
BASOPHILS NFR BLD AUTO: 0.2 % — SIGNIFICANT CHANGE UP (ref 0–2)
BILIRUB SERPL-MCNC: 1.6 MG/DL — HIGH (ref 0.2–1.2)
BILIRUB UR-MCNC: NEGATIVE — SIGNIFICANT CHANGE UP
BUN SERPL-MCNC: 18 MG/DL — SIGNIFICANT CHANGE UP (ref 7–23)
CALCIUM SERPL-MCNC: 9.1 MG/DL — SIGNIFICANT CHANGE UP (ref 8.5–10.1)
CHLORIDE SERPL-SCNC: 104 MMOL/L — SIGNIFICANT CHANGE UP (ref 96–108)
CO2 SERPL-SCNC: 28 MMOL/L — SIGNIFICANT CHANGE UP (ref 22–31)
COLOR SPEC: YELLOW — SIGNIFICANT CHANGE UP
CREAT SERPL-MCNC: 0.86 MG/DL — SIGNIFICANT CHANGE UP (ref 0.5–1.3)
DIFF PNL FLD: ABNORMAL
EGFR: 88 ML/MIN/1.73M2 — SIGNIFICANT CHANGE UP
EOSINOPHIL # BLD AUTO: 0.09 K/UL — SIGNIFICANT CHANGE UP (ref 0–0.5)
EOSINOPHIL NFR BLD AUTO: 0.7 % — SIGNIFICANT CHANGE UP (ref 0–6)
GLUCOSE SERPL-MCNC: 100 MG/DL — HIGH (ref 70–99)
GLUCOSE UR QL: NEGATIVE — SIGNIFICANT CHANGE UP
HCT VFR BLD CALC: 42.2 % — SIGNIFICANT CHANGE UP (ref 39–50)
HGB BLD-MCNC: 14 G/DL — SIGNIFICANT CHANGE UP (ref 13–17)
IMM GRANULOCYTES NFR BLD AUTO: 0.8 % — SIGNIFICANT CHANGE UP (ref 0–0.9)
INR BLD: 1.53 RATIO — HIGH (ref 0.88–1.16)
KETONES UR-MCNC: NEGATIVE — SIGNIFICANT CHANGE UP
LACTATE SERPL-SCNC: 1.6 MMOL/L — SIGNIFICANT CHANGE UP (ref 0.7–2)
LEUKOCYTE ESTERASE UR-ACNC: NEGATIVE — SIGNIFICANT CHANGE UP
LYMPHOCYTES # BLD AUTO: 0.53 K/UL — LOW (ref 1–3.3)
LYMPHOCYTES # BLD AUTO: 4.1 % — LOW (ref 13–44)
MCHC RBC-ENTMCNC: 33.2 GM/DL — SIGNIFICANT CHANGE UP (ref 32–36)
MCHC RBC-ENTMCNC: 33.3 PG — SIGNIFICANT CHANGE UP (ref 27–34)
MCV RBC AUTO: 100.2 FL — HIGH (ref 80–100)
MONOCYTES # BLD AUTO: 0.83 K/UL — SIGNIFICANT CHANGE UP (ref 0–0.9)
MONOCYTES NFR BLD AUTO: 6.4 % — SIGNIFICANT CHANGE UP (ref 2–14)
NEUTROPHILS # BLD AUTO: 11.49 K/UL — HIGH (ref 1.8–7.4)
NEUTROPHILS NFR BLD AUTO: 87.8 % — HIGH (ref 43–77)
NITRITE UR-MCNC: NEGATIVE — SIGNIFICANT CHANGE UP
PH UR: 6.5 — SIGNIFICANT CHANGE UP (ref 5–8)
PLATELET # BLD AUTO: 134 K/UL — LOW (ref 150–400)
POTASSIUM SERPL-MCNC: 4.7 MMOL/L — SIGNIFICANT CHANGE UP (ref 3.5–5.3)
POTASSIUM SERPL-SCNC: 4.7 MMOL/L — SIGNIFICANT CHANGE UP (ref 3.5–5.3)
PROT SERPL-MCNC: 6.7 GM/DL — SIGNIFICANT CHANGE UP (ref 6–8.3)
PROT UR-MCNC: NEGATIVE — SIGNIFICANT CHANGE UP
PROTHROM AB SERPL-ACNC: 17.8 SEC — HIGH (ref 10.5–13.4)
RAPID RVP RESULT: SIGNIFICANT CHANGE UP
RBC # BLD: 4.21 M/UL — SIGNIFICANT CHANGE UP (ref 4.2–5.8)
RBC # FLD: 14.6 % — HIGH (ref 10.3–14.5)
SARS-COV-2 RNA SPEC QL NAA+PROBE: SIGNIFICANT CHANGE UP
SODIUM SERPL-SCNC: 136 MMOL/L — SIGNIFICANT CHANGE UP (ref 135–145)
SP GR SPEC: 1.01 — SIGNIFICANT CHANGE UP (ref 1.01–1.02)
TROPONIN I, HIGH SENSITIVITY RESULT: 21.64 NG/L — SIGNIFICANT CHANGE UP
UROBILINOGEN FLD QL: NEGATIVE — SIGNIFICANT CHANGE UP
WBC # BLD: 13.06 K/UL — HIGH (ref 3.8–10.5)
WBC # FLD AUTO: 13.06 K/UL — HIGH (ref 3.8–10.5)

## 2022-12-01 PROCEDURE — 76705 ECHO EXAM OF ABDOMEN: CPT

## 2022-12-01 PROCEDURE — 74177 CT ABD & PELVIS W/CONTRAST: CPT | Mod: 26,MA

## 2022-12-01 PROCEDURE — 99285 EMERGENCY DEPT VISIT HI MDM: CPT | Mod: CS

## 2022-12-01 PROCEDURE — 71260 CT THORAX DX C+: CPT | Mod: 26,MA

## 2022-12-01 PROCEDURE — 97116 GAIT TRAINING THERAPY: CPT | Mod: GP

## 2022-12-01 PROCEDURE — 97163 PT EVAL HIGH COMPLEX 45 MIN: CPT | Mod: GP

## 2022-12-01 PROCEDURE — 85027 COMPLETE CBC AUTOMATED: CPT

## 2022-12-01 PROCEDURE — 70450 CT HEAD/BRAIN W/O DYE: CPT | Mod: 26,MA

## 2022-12-01 PROCEDURE — 36415 COLL VENOUS BLD VENIPUNCTURE: CPT

## 2022-12-01 PROCEDURE — 99222 1ST HOSP IP/OBS MODERATE 55: CPT

## 2022-12-01 PROCEDURE — 80053 COMPREHEN METABOLIC PANEL: CPT

## 2022-12-01 RX ORDER — SODIUM CHLORIDE 9 MG/ML
2600 INJECTION, SOLUTION INTRAVENOUS ONCE
Refills: 0 | Status: COMPLETED | OUTPATIENT
Start: 2022-12-01 | End: 2022-12-01

## 2022-12-01 RX ORDER — LANOLIN ALCOHOL/MO/W.PET/CERES
1 CREAM (GRAM) TOPICAL
Qty: 0 | Refills: 0 | DISCHARGE

## 2022-12-01 RX ORDER — ACETAMINOPHEN 500 MG
650 TABLET ORAL ONCE
Refills: 0 | Status: COMPLETED | OUTPATIENT
Start: 2022-12-01 | End: 2022-12-01

## 2022-12-01 RX ORDER — LANOLIN ALCOHOL/MO/W.PET/CERES
3 CREAM (GRAM) TOPICAL AT BEDTIME
Refills: 0 | Status: DISCONTINUED | OUTPATIENT
Start: 2022-12-01 | End: 2022-12-03

## 2022-12-01 RX ORDER — ACETAMINOPHEN 500 MG
2 TABLET ORAL
Qty: 0 | Refills: 0 | DISCHARGE

## 2022-12-01 RX ORDER — MORPHINE SULFATE 50 MG/1
4 CAPSULE, EXTENDED RELEASE ORAL ONCE
Refills: 0 | Status: DISCONTINUED | OUTPATIENT
Start: 2022-12-01 | End: 2022-12-01

## 2022-12-01 RX ORDER — ATORVASTATIN CALCIUM 80 MG/1
80 TABLET, FILM COATED ORAL AT BEDTIME
Refills: 0 | Status: DISCONTINUED | OUTPATIENT
Start: 2022-12-01 | End: 2022-12-03

## 2022-12-01 RX ORDER — OXYCODONE HYDROCHLORIDE 5 MG/1
10 TABLET ORAL ONCE
Refills: 0 | Status: DISCONTINUED | OUTPATIENT
Start: 2022-12-01 | End: 2022-12-01

## 2022-12-01 RX ORDER — PIPERACILLIN AND TAZOBACTAM 4; .5 G/20ML; G/20ML
3.38 INJECTION, POWDER, LYOPHILIZED, FOR SOLUTION INTRAVENOUS ONCE
Refills: 0 | Status: DISCONTINUED | OUTPATIENT
Start: 2022-12-01 | End: 2022-12-02

## 2022-12-01 RX ORDER — OXYCODONE HYDROCHLORIDE 5 MG/1
10 TABLET ORAL THREE TIMES A DAY
Refills: 0 | Status: DISCONTINUED | OUTPATIENT
Start: 2022-12-01 | End: 2022-12-03

## 2022-12-01 RX ORDER — LABETALOL HCL 100 MG
10 TABLET ORAL ONCE
Refills: 0 | Status: COMPLETED | OUTPATIENT
Start: 2022-12-01 | End: 2022-12-01

## 2022-12-01 RX ORDER — ONDANSETRON 8 MG/1
4 TABLET, FILM COATED ORAL EVERY 8 HOURS
Refills: 0 | Status: DISCONTINUED | OUTPATIENT
Start: 2022-12-01 | End: 2022-12-03

## 2022-12-01 RX ORDER — FOLIC ACID 0.8 MG
1 TABLET ORAL DAILY
Refills: 0 | Status: DISCONTINUED | OUTPATIENT
Start: 2022-12-01 | End: 2022-12-03

## 2022-12-01 RX ORDER — AMIODARONE HYDROCHLORIDE 400 MG/1
100 TABLET ORAL DAILY
Refills: 0 | Status: DISCONTINUED | OUTPATIENT
Start: 2022-12-01 | End: 2022-12-03

## 2022-12-01 RX ORDER — PIPERACILLIN AND TAZOBACTAM 4; .5 G/20ML; G/20ML
3.38 INJECTION, POWDER, LYOPHILIZED, FOR SOLUTION INTRAVENOUS ONCE
Refills: 0 | Status: COMPLETED | OUTPATIENT
Start: 2022-12-01 | End: 2022-12-01

## 2022-12-01 RX ORDER — METOPROLOL TARTRATE 50 MG
50 TABLET ORAL
Refills: 0 | Status: DISCONTINUED | OUTPATIENT
Start: 2022-12-01 | End: 2022-12-03

## 2022-12-01 RX ORDER — METOPROLOL TARTRATE 50 MG
50 TABLET ORAL ONCE
Refills: 0 | Status: COMPLETED | OUTPATIENT
Start: 2022-12-01 | End: 2022-12-01

## 2022-12-01 RX ORDER — APIXABAN 2.5 MG/1
5 TABLET, FILM COATED ORAL EVERY 12 HOURS
Refills: 0 | Status: DISCONTINUED | OUTPATIENT
Start: 2022-12-01 | End: 2022-12-03

## 2022-12-01 RX ORDER — LEVETIRACETAM 250 MG/1
500 TABLET, FILM COATED ORAL ONCE
Refills: 0 | Status: DISCONTINUED | OUTPATIENT
Start: 2022-12-01 | End: 2022-12-03

## 2022-12-01 RX ORDER — LEVETIRACETAM 250 MG/1
500 TABLET, FILM COATED ORAL
Refills: 0 | Status: DISCONTINUED | OUTPATIENT
Start: 2022-12-01 | End: 2022-12-03

## 2022-12-01 RX ORDER — LEVETIRACETAM 250 MG/1
250 TABLET, FILM COATED ORAL ONCE
Refills: 0 | Status: COMPLETED | OUTPATIENT
Start: 2022-12-01 | End: 2022-12-01

## 2022-12-01 RX ORDER — ACETAMINOPHEN 500 MG
650 TABLET ORAL EVERY 6 HOURS
Refills: 0 | Status: DISCONTINUED | OUTPATIENT
Start: 2022-12-01 | End: 2022-12-03

## 2022-12-01 RX ORDER — AMIODARONE HYDROCHLORIDE 400 MG/1
100 TABLET ORAL ONCE
Refills: 0 | Status: COMPLETED | OUTPATIENT
Start: 2022-12-01 | End: 2022-12-01

## 2022-12-01 RX ORDER — ACETAMINOPHEN 500 MG
1000 TABLET ORAL ONCE
Refills: 0 | Status: COMPLETED | OUTPATIENT
Start: 2022-12-01 | End: 2022-12-01

## 2022-12-01 RX ORDER — ASPIRIN/CALCIUM CARB/MAGNESIUM 324 MG
81 TABLET ORAL DAILY
Refills: 0 | Status: DISCONTINUED | OUTPATIENT
Start: 2022-12-01 | End: 2022-12-03

## 2022-12-01 RX ADMIN — MORPHINE SULFATE 4 MILLIGRAM(S): 50 CAPSULE, EXTENDED RELEASE ORAL at 17:46

## 2022-12-01 RX ADMIN — Medication 400 MILLIGRAM(S): at 13:05

## 2022-12-01 RX ADMIN — AMIODARONE HYDROCHLORIDE 100 MILLIGRAM(S): 400 TABLET ORAL at 20:13

## 2022-12-01 RX ADMIN — OXYCODONE HYDROCHLORIDE 10 MILLIGRAM(S): 5 TABLET ORAL at 15:15

## 2022-12-01 RX ADMIN — ATORVASTATIN CALCIUM 80 MILLIGRAM(S): 80 TABLET, FILM COATED ORAL at 23:28

## 2022-12-01 RX ADMIN — LEVETIRACETAM 250 MILLIGRAM(S): 250 TABLET, FILM COATED ORAL at 17:47

## 2022-12-01 RX ADMIN — LEVETIRACETAM 500 MILLIGRAM(S): 250 TABLET, FILM COATED ORAL at 23:28

## 2022-12-01 RX ADMIN — Medication 50 MILLIGRAM(S): at 23:29

## 2022-12-01 RX ADMIN — Medication 650 MILLIGRAM(S): at 18:48

## 2022-12-01 RX ADMIN — SODIUM CHLORIDE 2600 MILLILITER(S): 9 INJECTION, SOLUTION INTRAVENOUS at 13:04

## 2022-12-01 RX ADMIN — APIXABAN 5 MILLIGRAM(S): 2.5 TABLET, FILM COATED ORAL at 23:28

## 2022-12-01 RX ADMIN — PIPERACILLIN AND TAZOBACTAM 200 GRAM(S): 4; .5 INJECTION, POWDER, LYOPHILIZED, FOR SOLUTION INTRAVENOUS at 15:02

## 2022-12-01 RX ADMIN — Medication 50 MILLIGRAM(S): at 17:47

## 2022-12-01 NOTE — H&P ADULT - NSHPREVIEWOFSYSTEMS_GEN_ALL_CORE
good balance Gen: ++ fever, ++chills, ++weakness  ENT: No visual changes or throat pain  Neck: No pain or stiffness  Respiratory: No cough or wheezing  Cardiovascular: No chest pain or palpitations  Gastrointestinal: No abdominal pain, nausea, vomiting, constipation, or diarrhea  Hematologic: No easy bleeding or bruising  Neurologic: No numbness or focal weakness  Psych: No depression or insomnia  Skin: No rash or itching

## 2022-12-01 NOTE — ED PROVIDER NOTE - CARE PLAN
Principal Discharge DX:	Sepsis  Secondary Diagnosis:	Atrial fibrillation with rapid ventricular response   1

## 2022-12-01 NOTE — H&P ADULT - ASSESSMENT
A/P:    1.  A fib RVR  -patient was in Afib with RVR in ED, most likely due to high temperature  -HR is improved now with temp improvement  -follow HR  -on Eliquis    2.  Weakness  Fever  AMS  -no source of fever so far  -so watch him off the antibiotics  -follow clinically   -follow cultures  -follow PT eval for weakness  -fall precaution  -leucocytosis may be from recent prednisone use     3.  Multiple rib fractures  -sub acute  -follow clinically  -pain meds as needed  -Incentive spirometry    4.  Eliquis for DVT ppx    5.  Code status: Full code

## 2022-12-01 NOTE — ED ADULT NURSE NOTE - CHIEF COMPLAINT QUOTE
pt BIBA from home d/t increased weakness, fever (102.5). as per EMS hx stroke with baseline left sided weakness. pt received covid booster yesteday.

## 2022-12-01 NOTE — CONSULT NOTE ADULT - SUBJECTIVE AND OBJECTIVE BOX
Patient with confused speech, History obtained from wife    HPI: 78 y/o male with PMHx of CVA with baseline left sided weakness, seizure disorder, Afib presents to the ED BIBA from home c/o weakness and fever tmax 102.5 for the past couple days. Wife reports patient was at normal mental status until today when she discovered him in her bedroom detention off the bed and disoriented not making any sense. Wife and patient's son helped patient to the ground slowly and called EMS after patient could not get back up on his own and continued to be altered. Denies any recent trauma      Vitals:  T(C): 38.5 (12-01 @ 18:25), Max: 38.5 (12-01 @ 18:25)  HR: 86 (12-01 @ 20:22) (86 - 131)  BP: 130/77 (12-01 @ 20:22) (130/77 - 163/100)  RR: 16 (12-01 @ 20:22) (16 - 21)  SpO2: 92% (12-01 @ 20:22) (92% - 96%)      Physical Exam:  General: AAOx2(Person/Place), elderly, NAD  Chest: Normal respiratory effort  Heart: RRR  Abdomen: Soft, NTND, no masses  Neuro/Psych: No localized deficits. Normal speech, normal tone  Skin: Normal, no rashes, no lesions noted.   Extremities: Warm, well perfused, no edema, Pulses intact    12-01 @ 12:36                    14.0  CBC: 13.06>)-------(<134                     42.2                 136 | 104 | 18    CMP:  ----------------------< 100               4.7 | 28 | 0.86                      Ca:9.1  Phos:-  Mg:-               1.6|      |21        LFTs:  ------|92|-----             -|      |-      Current Inpatient Medications:  acetaminophen     Tablet .. 650 milliGRAM(s) Oral every 6 hours PRN  aMIOdarone    Tablet 100 milliGRAM(s) Oral daily  apixaban 5 milliGRAM(s) Oral every 12 hours  aspirin enteric coated 81 milliGRAM(s) Oral daily  atorvastatin 80 milliGRAM(s) Oral at bedtime  folic acid 1 milliGRAM(s) Oral daily  levETIRAcetam 500 milliGRAM(s) Oral two times a day  levETIRAcetam 500 milliGRAM(s) Oral once  metoprolol tartrate 50 milliGRAM(s) Oral two times a day  oxyCODONE    IR 10 milliGRAM(s) Oral three times a day PRN  piperacillin/tazobactam IVPB.. 3.375 Gram(s) IV Intermittent once          < from: CT Chest w/ IV Cont (12.01.22 @ 13:39) >    ACC: [id number] EXAM:  CT ABDOMEN AND PELVIS IC                        ACC: [id number] EXAM:  CT CHEST IC                          PROCEDURE DATE:  12/01/2022          INTERPRETATION:  CLINICAL INFORMATION: 77-year-old male patient with   sepsis    COMPARISON: CT lumbar spine 10/20/2015.    CONTRAST/COMPLICATIONS:  IV Contrast: Omnipaque 350 (accession [id number]), IV contrast documented   in associated exam (accession [id number])  90 cc administered   10 cc   discarded  Oral Contrast: NONE  Complications: None reported at time of study completion    PROCEDURE:  CT of the Chest, Abdomen and Pelvis was performed.  Sagittal and coronal reformats were performed.    FINDINGS:  CHEST:  LUNGS AND LARGE AIRWAYS: Motion artifact is limiting the evaluation.  Subtle bibasilar atelectatic changes. Patent central airways. No discrete   solid pulmonary nodules. There is an ill-defined subcentimeter density in   the right upper lobe, image 2-27 which is likely artifactual secondary to   motion artifacts. Noconsolidation.  PLEURA: No pleural effusion.  VESSELS: Ascending thoracic aorta measures 3.8 cm, the pulmonary trunk   3.6 cm. No central pulmonary arterial filling defects. Coronary artery   calcifications are present and aortic valvular calcifications.  HEART: Heart size is enlarged.  Minimal pericardial fluid.  MEDIASTINUM AND GOMEZ: No lymphadenopathy.  CHEST WALL AND LOWER NECK: Thyroid gland is within normal limits.    ABDOMEN AND PELVIS:  LIVER: Within normal limits. No focal lesions.  BILEDUCTS: Normal caliber.  GALLBLADDER: Within normal limits. No calcified calculi  SPLEEN: Within normal limits. No focal lesions.  PANCREAS: Normal appearance of the mildly atrophic pancreas. No ductal   dilatation.  ADRENALS: Within normal limits.  KIDNEYS/URETERS: Numerous subcentimeter cortical hypodensities in both   kidneys are too small to characterize. No hydroureteronephrosis or   nephroureterolithiasis.    BLADDER: Within normal limits.  REPRODUCTIVE ORGANS: Prostate within normal limits.    BOWEL: No bowel obstruction. Appendix is not visualized. Scattered   diverticula in the sigmoid colon without acute diverticulitis.  PERITONEUM: No ascites. No loculated fluid collections, no   pneumoperitoneum.  VESSELS: Normal caliber of the abdominal aorta. Mild atherosclerotic   changes. Calcific plaques at the origin of the celiac axis and SMA and   the origin of the renal arteries which are patent.  RETROPERITONEUM/LYMPH NODES: No lymphadenopathy.  ABDOMINAL WALL: Gluteal muscle hypotrophy on the right.  BONES: Spinal fusion hardware with posterior laminectomy L4, L5 and S1.   Multilevel discogenic degenerative disease predominantly involving the   lumbar spine and spondylosis in the lower thoracic spine. No lytic or   blastic bony lesions of destruction.  Rudimentary left first rib. Acute or recently acute rib fractures of the   of the left eighth, ninth and 10th ribs with mild placement.    IMPRESSION:  No evidence of an obvious infectious focus in the chest, abdomen or   pelvis to account for the patient's symptoms of sepsis.    Acute mildly displaced rib fractures left lateral 8th through 10th rib.        --- End of Report ---            LIO RICHEY MD; Attending Radiologist  This document has been electronically signed. Dec  1 2022  2:20PM    < end of copied text >

## 2022-12-01 NOTE — CONSULT NOTE ADULT - ASSESSMENT
77M with no recent trauma presenting with acute onset altered mental status    Plan:  CT scan significant for subacute rib fracture 8-10  Physical exam benign and pain well controlled  SpO2>95% on RA  Recc pain control as needed and incentive spirometry use  Patient is clear from a trauma perspective  No acute intervention at this time    Plan discussed with Dr. Dewitt

## 2022-12-01 NOTE — ED PROVIDER NOTE - PROGRESS NOTE DETAILS
Juan Gee:  Took over Dr. Shields pending trauma eval. Per trauma, pt does not require trauma admission. Upon reevaluation, pt still tachycardic despite IV fluids, 1000mg Tylenol, and 50 mg Metoprolol oral once. Recheck temp 101.3. Plan for another dose of Tylenol. Pt unable to get NSAIDs secondary to Eliquis use. Will give another medication for rate control after bedside ultrasound to evaluate cardiac function. Juan Gee:  Pt reevaluated. Heart rate currently in low 100s. Blood pressure 150/95. No signs of fluid overload on bedside ultrasound. Heart is hypodynamic. No B lines on lung exam. Plan for admission to medicine service on tele. Juan Matias for Dr. Gee:  Discussed with medicine. Medicine recommends trauma admission given rib fractures. Dr. Dewitt evaluated pt and believes pt is not a trauma admission since pt's fractures are likely not acute and are asymptomatic from that perspective. Discussed again with med team. Will have med team discuss with trauma team to discuss where pt is going in the hospital. Medicine to admit. Orville Gee D.O.     I personally performed the services described in the documentation, reviewed the documentation recorded by the scribe in my presence and it accurately and completely records my words and action.  Orville Gee D.O.

## 2022-12-01 NOTE — ED PROVIDER NOTE - NS_ATTENDINGSCRIBE_ED_ALL_ED
0
I personally performed the service described in the documentation recorded by the scribe in my presence, and it accurately and completely records my words and actions.

## 2022-12-01 NOTE — PHARMACOTHERAPY INTERVENTION NOTE - COMMENTS
Medication history complete, patient unable to provide history, called patients wife at [phone] but there was no answer, no other numbers on file, went off DrFirst only.

## 2022-12-01 NOTE — ED PROVIDER NOTE - PHYSICAL EXAMINATION
Vital signs as available reviewed.  General:  No acute distress.  Head:  Normocephalic, atraumatic.  Eyes:  Conjunctiva pink, no icterus.  Cardiovascular:  Regular rate, no obvious murmur.  Respiratory:  Clear to auscultation, good air entry bilaterally.  Abdomen:  +b/l upper abd TTP, worse in RUQ  Musculoskeletal:  No obvious deformity.  Neurologic: Alert and oriented to self/season not year or location, left upper extremity contracted.   Skin:  Warm and dry.

## 2022-12-01 NOTE — ED PROVIDER NOTE - NS ED ROS FT
Constitutional: +fever, +weakness  Neurological: No reported acute headache.  Eyes: No reported new vision changes.   Ears, Nose, Mouth, Throat: No reported acute sore throat.  Cardiovascular: No reported current chest pain.  Respiratory: No reported new shortness of breath.  Gastrointestinal: No reported vomiting.  Genitourinary: No reported new urinary problems.  Musculoskeletal: No reported acute extremity pain.  Integumentary (skin and/or breast): No reported new rash.

## 2022-12-01 NOTE — ED ADULT NURSE REASSESSMENT NOTE - NS ED NURSE REASSESS COMMENT FT1
Received patient from Ledy Shaffer RN at 1415. Pt has been resting in bed. Pt with complaints of pain. Pt with chronic back pain. Pt medicated as per MD orders with daily medications that patient had not taken today. As per patients wife, previously at bedside, patient with no known falls. Trauma to come and evaluate patient and patient to be admitted to the hospital. Comfort and safety maintained. Cardiac monitoring maintained.

## 2022-12-01 NOTE — ED PROVIDER NOTE - OBJECTIVE STATEMENT
76 y/o male with PMHx of CVA with baseline left sided weakness, seizure disorder, Afib presents to the ED BIBA from home c/o weakness and fever tmax 102.5 for the past couple days. Pt received COVID booster yesterday. Denies SOB, denies dysuria, denies diarrhea.

## 2022-12-01 NOTE — ED ADULT TRIAGE NOTE - CHIEF COMPLAINT QUOTE
pt BIBA from home d/t increased weakness, fever (102.5). as per EMS hx stroke with baseline left sided weakness pt BIBA from home d/t increased weakness, fever (102.5). as per EMS hx stroke with baseline left sided weakness. pt received covid booster yesteday.

## 2022-12-01 NOTE — H&P ADULT - HISTORY OF PRESENT ILLNESS
76 y/o Male with baseline Left sided weakness, seizure disorder, Afib presents to the ED BIBA from home with complain of weakness and fever, His home tmax 102.5 for the past couple days. Patient received COVID booster yesterday. Denies SOB, denies dysuria, denies diarrhea. No chest pain, no palpitation. As per the Trauma note: "Wife reported patient was at normal mental status until today when she discovered him in her bedroom intermediate off the bed and disoriented not making any sense. Wife and patient's son helped patient to the ground slowly and called EMS after patient could not get back up on his own and continued to be altered. Denies any recent trauma". I personally could not verify any HPI as I was unable to reach any family member over the phone.

## 2022-12-01 NOTE — ED PROVIDER NOTE - CLINICAL SUMMARY MEDICAL DECISION MAKING FREE TEXT BOX
Pt here with fever, exam with concern for abd tenderness. Will do septic workup, get advanced imaging, treat empirically with abx and re-evaluate.

## 2022-12-02 LAB
ALBUMIN SERPL ELPH-MCNC: 3.1 G/DL — LOW (ref 3.3–5)
ALP SERPL-CCNC: 79 U/L — SIGNIFICANT CHANGE UP (ref 40–120)
ALT FLD-CCNC: 35 U/L — SIGNIFICANT CHANGE UP (ref 12–78)
ANION GAP SERPL CALC-SCNC: 0 MMOL/L — LOW (ref 5–17)
AST SERPL-CCNC: 26 U/L — SIGNIFICANT CHANGE UP (ref 15–37)
BILIRUB SERPL-MCNC: 1.7 MG/DL — HIGH (ref 0.2–1.2)
BUN SERPL-MCNC: 12 MG/DL — SIGNIFICANT CHANGE UP (ref 7–23)
CALCIUM SERPL-MCNC: 9.1 MG/DL — SIGNIFICANT CHANGE UP (ref 8.5–10.1)
CHLORIDE SERPL-SCNC: 103 MMOL/L — SIGNIFICANT CHANGE UP (ref 96–108)
CO2 SERPL-SCNC: 33 MMOL/L — HIGH (ref 22–31)
CREAT SERPL-MCNC: 0.71 MG/DL — SIGNIFICANT CHANGE UP (ref 0.5–1.3)
CULTURE RESULTS: SIGNIFICANT CHANGE UP
EGFR: 93 ML/MIN/1.73M2 — SIGNIFICANT CHANGE UP
GLUCOSE SERPL-MCNC: 94 MG/DL — SIGNIFICANT CHANGE UP (ref 70–99)
HCT VFR BLD CALC: 45.7 % — SIGNIFICANT CHANGE UP (ref 39–50)
HGB BLD-MCNC: 15 G/DL — SIGNIFICANT CHANGE UP (ref 13–17)
MCHC RBC-ENTMCNC: 32.8 GM/DL — SIGNIFICANT CHANGE UP (ref 32–36)
MCHC RBC-ENTMCNC: 33.1 PG — SIGNIFICANT CHANGE UP (ref 27–34)
MCV RBC AUTO: 100.9 FL — HIGH (ref 80–100)
PLATELET # BLD AUTO: 134 K/UL — LOW (ref 150–400)
POTASSIUM SERPL-MCNC: 4.6 MMOL/L — SIGNIFICANT CHANGE UP (ref 3.5–5.3)
POTASSIUM SERPL-SCNC: 4.6 MMOL/L — SIGNIFICANT CHANGE UP (ref 3.5–5.3)
PROT SERPL-MCNC: 6.3 GM/DL — SIGNIFICANT CHANGE UP (ref 6–8.3)
RBC # BLD: 4.53 M/UL — SIGNIFICANT CHANGE UP (ref 4.2–5.8)
RBC # FLD: 14.5 % — SIGNIFICANT CHANGE UP (ref 10.3–14.5)
SODIUM SERPL-SCNC: 136 MMOL/L — SIGNIFICANT CHANGE UP (ref 135–145)
SPECIMEN SOURCE: SIGNIFICANT CHANGE UP
WBC # BLD: 9.6 K/UL — SIGNIFICANT CHANGE UP (ref 3.8–10.5)
WBC # FLD AUTO: 9.6 K/UL — SIGNIFICANT CHANGE UP (ref 3.8–10.5)

## 2022-12-02 PROCEDURE — 99233 SBSQ HOSP IP/OBS HIGH 50: CPT

## 2022-12-02 PROCEDURE — 76705 ECHO EXAM OF ABDOMEN: CPT | Mod: 26

## 2022-12-02 RX ORDER — SODIUM CHLORIDE 9 MG/ML
1000 INJECTION INTRAMUSCULAR; INTRAVENOUS; SUBCUTANEOUS ONCE
Refills: 0 | Status: COMPLETED | OUTPATIENT
Start: 2022-12-02 | End: 2022-12-02

## 2022-12-02 RX ORDER — SODIUM CHLORIDE 9 MG/ML
1000 INJECTION INTRAMUSCULAR; INTRAVENOUS; SUBCUTANEOUS
Refills: 0 | Status: DISCONTINUED | OUTPATIENT
Start: 2022-12-02 | End: 2022-12-03

## 2022-12-02 RX ADMIN — APIXABAN 5 MILLIGRAM(S): 2.5 TABLET, FILM COATED ORAL at 21:17

## 2022-12-02 RX ADMIN — Medication 650 MILLIGRAM(S): at 00:26

## 2022-12-02 RX ADMIN — Medication 1000 MILLIGRAM(S): at 00:26

## 2022-12-02 RX ADMIN — LEVETIRACETAM 500 MILLIGRAM(S): 250 TABLET, FILM COATED ORAL at 11:01

## 2022-12-02 RX ADMIN — APIXABAN 5 MILLIGRAM(S): 2.5 TABLET, FILM COATED ORAL at 10:48

## 2022-12-02 RX ADMIN — Medication 81 MILLIGRAM(S): at 10:48

## 2022-12-02 RX ADMIN — MORPHINE SULFATE 4 MILLIGRAM(S): 50 CAPSULE, EXTENDED RELEASE ORAL at 00:26

## 2022-12-02 RX ADMIN — PIPERACILLIN AND TAZOBACTAM 3.38 GRAM(S): 4; .5 INJECTION, POWDER, LYOPHILIZED, FOR SOLUTION INTRAVENOUS at 00:27

## 2022-12-02 RX ADMIN — ATORVASTATIN CALCIUM 80 MILLIGRAM(S): 80 TABLET, FILM COATED ORAL at 21:17

## 2022-12-02 RX ADMIN — Medication 50 MILLIGRAM(S): at 11:01

## 2022-12-02 RX ADMIN — Medication 50 MILLIGRAM(S): at 21:17

## 2022-12-02 RX ADMIN — LEVETIRACETAM 500 MILLIGRAM(S): 250 TABLET, FILM COATED ORAL at 21:17

## 2022-12-02 RX ADMIN — OXYCODONE HYDROCHLORIDE 10 MILLIGRAM(S): 5 TABLET ORAL at 00:27

## 2022-12-02 RX ADMIN — SODIUM CHLORIDE 500 MILLILITER(S): 9 INJECTION INTRAMUSCULAR; INTRAVENOUS; SUBCUTANEOUS at 13:15

## 2022-12-02 RX ADMIN — SODIUM CHLORIDE 75 MILLILITER(S): 9 INJECTION INTRAMUSCULAR; INTRAVENOUS; SUBCUTANEOUS at 16:11

## 2022-12-02 RX ADMIN — AMIODARONE HYDROCHLORIDE 100 MILLIGRAM(S): 400 TABLET ORAL at 10:46

## 2022-12-02 RX ADMIN — Medication 1 MILLIGRAM(S): at 10:49

## 2022-12-02 NOTE — PATIENT PROFILE ADULT - NSTOBACCONEVERSMOKERY/N_GEN_A
Age: 30 days, 33w 4d CGA    SOCIAL COMMENTS:  - 9/4: Mom updated at bedside by NNP    SCREENING PLANS:  - Hearing screen  - Car seat test  - NBS on DOL 28 -pending 9/25  - CUS at DOL 30- ordered for 9/28  - ROP repeat on 10/10    IMMUNIZATIONS:  Hepatitis B - due at 30 days- needs to be ordered on 9/28 (nursing aware to obtain consent from mom)    COMPLETED:  - 8/31: NBS - pending MPS, POMPE, SMA. All other results normal.   - 9/2: CUS normal    Yes

## 2022-12-02 NOTE — PHYSICAL THERAPY INITIAL EVALUATION ADULT - PERTINENT HX OF CURRENT PROBLEM, REHAB EVAL
78 y/o Male with baseline Left sided weakness, seizure disorder, Afib presents to the ED BIBA from home with complain of weakness and fever, His home tmax 102.5 for the past couple days. Patient received COVID booster yesterday. Denies SOB, denies dysuria, denies diarrhea. patient was in Afib with RVR in ED, most likely due to high temperature. Multiple rib fractures-sub acute.

## 2022-12-02 NOTE — PROGRESS NOTE ADULT - ASSESSMENT
# Orthostatic hypotension likely due to dehydration after recent COVID booster   # Generalized fatigue due to above   febrile in ER, now afebrile, no need for Abx   CT head, chest, abdomen and pelvis - no source of infection   UA - negative   - given bolus NS 1 L - still positive orthostatic   - gentle hydration overnight  - check orthostatics q 12 hrs     # Mildly elevated bilirubin   pt. is on amiodarone 100 mg po daily and  keppra 500 mg po bid for seizure disorder   - trend bilirubin: 1.6->1.7  - CT abdomen - liver/gallbladder - wnl   - will check RUQ US and trend LFTs    # Chronic Afib RVR  -patient was in Afib with RVR in ED, most likely due to high temperature  now rate controlled   - cont. eliquis, amiodarone and  BB     # Seizure disorder  - cont. keppra at home dose     # Multiple rib fractures  pt. denies pain   -sub acute  -follow clinically  - tylenol for pain   - PT

## 2022-12-02 NOTE — PROGRESS NOTE ADULT - SUBJECTIVE AND OBJECTIVE BOX
Chief Complaint: Afib with RVR and weakness    Subjective and objective    76 y/o Male with baseline Left sided weakness, seizure disorder, Afib presents to the ED BIBA from home with complain of weakness and fever, His home tmax 102.5 for the past couple days. Patient received COVID booster yesterday. Denies SOB, denies dysuria, denies diarrhea. No chest pain, no palpitation. As per the Trauma note: "Wife reported patient was at normal mental status until today when she discovered him in her bedroom longterm off the bed and disoriented not making any sense. Wife and patient's son helped patient to the ground slowly and called EMS after patient could not get back up on his own and continued to be altered. Denies any recent trauma". I personally could not verify any HPI as I was unable to reach any family member over the phone.    REVIEW OF SYSTEMS:  All other review of systems is negative unless indicated above    Vital Signs Last 24 Hrs  T(C): 36.8 (02 Dec 2022 08:49), Max: 38.5 (01 Dec 2022 18:25)  T(F): 98.3 (02 Dec 2022 08:49), Max: 101.3 (01 Dec 2022 18:25)  HR: 83 (02 Dec 2022 08:49) (80 - 131)  BP: 127/73 (02 Dec 2022 08:49) (117/77 - 167/98)  BP(mean): 118 (01 Dec 2022 23:10) (103 - 133)  RR: 18 (02 Dec 2022 08:49) (16 - 21)  SpO2: 97% (02 Dec 2022 08:49) (92% - 97%)    Parameters below as of 02 Dec 2022 08:49  Patient On (Oxygen Delivery Method): room air        I&O's Summary      CAPILLARY BLOOD GLUCOSE          PHYSICAL EXAM:    Constitutional: NAD, awake and alert, well-developed  HEENT: PERR, EOMI, Normal Hearing, MMM  Neck: Soft and supple, No LAD, No JVD  Respiratory: Breath sounds are clear bilaterally, No wheezing, rales or rhonchi  Cardiovascular: S1 and S2, regular rate and rhythm, no Murmurs, gallops or rubs  Gastrointestinal: Bowel Sounds present, soft, nontender, nondistended, no guarding, no rebound  Extremities: No peripheral edema  Vascular: 2+ peripheral pulses  Neurological: A/O x 3, no focal deficits  Musculoskeletal: 5/5 strength b/l upper and lower extremities  Skin: No rashes    Medications:  MEDICATIONS  (STANDING):  aMIOdarone    Tablet 100 milliGRAM(s) Oral daily  apixaban 5 milliGRAM(s) Oral every 12 hours  aspirin enteric coated 81 milliGRAM(s) Oral daily  atorvastatin 80 milliGRAM(s) Oral at bedtime  folic acid 1 milliGRAM(s) Oral daily  levETIRAcetam 500 milliGRAM(s) Oral two times a day  levETIRAcetam 500 milliGRAM(s) Oral once  metoprolol tartrate 50 milliGRAM(s) Oral two times a day  sodium chloride 0.9% Bolus 1000 milliLiter(s) IV Bolus once  sodium chloride 0.9%. 1000 milliLiter(s) (75 mL/Hr) IV Continuous <Continuous>      Labs: All Labs Reviewed:                        15.0   9.60  )-----------( 134      ( 02 Dec 2022 07:57 )             45.7     12    136  |  103  |  12  ----------------------------<  94  4.6   |  33<H>  |  0.71    Ca    9.1      02 Dec 2022 07:57    TPro  6.3  /  Alb  3.1<L>  /  TBili  1.7<H>  /  DBili  x   /  AST  26  /  ALT  35  /  AlkPhos  79  12-02    PT/INR - ( 01 Dec 2022 12:36 )   PT: 17.8 sec;   INR: 1.53 ratio         PTT - ( 01 Dec 2022 12:36 )  PTT:30.5 sec  Urinalysis Basic - ( 01 Dec 2022 12:55 )    Color: Yellow / Appearance: Clear / S.015 / pH: x  Gluc: x / Ketone: Negative  / Bili: Negative / Urobili: Negative   Blood: x / Protein: Negative / Nitrite: Negative   Leuk Esterase: Negative / RBC: 6-10 /HPF / WBC 3-5   Sq Epi: x / Non Sq Epi: Occasional / Bacteria: Negative      RADIOLOGY/EKG:all tests were reviewed   r< from: CT Head No Cont (22 @ 13:28) >    IMPRESSION:  No acute intracranial hemorrhage or acute territorial infarct.  If   symptoms persist, follow-up MRI exam recommended.    < end of copied text >  < from: CT Abdomen and Pelvis w/ IV Cont (22 @ 13:39) >  ACC: [id number] EXAM:  CT ABDOMEN AND PELVIS IC                        ACC: [id number] EXAM:  CT CHEST IC                          PROCEDURE DATE:  2022          INTERPRETATION:  CLINICAL INFORMATION: 77-year-old male patient with   sepsis    COMPARISON: CT lumbar spine 10/20/2015.    CONTRAST/COMPLICATIONS:  IV Contrast: Omnipaque 350 (accession [id number]), IV contrast documented   in associated exam (accession [id number])  90 cc administered   10 cc   discarded  Oral Contrast: NONE  Complications: None reported at time of study completion    PROCEDURE:  CT of the Chest, Abdomen and Pelvis was performed.  Sagittal and coronal reformats were performed.    FINDINGS:  CHEST:  LUNGS AND LARGE AIRWAYS: Motion artifact is limiting the evaluation.  Subtle bibasilar atelectatic changes. Patent central airways. No discrete   solid pulmonary nodules. There is an ill-defined subcentimeter density in   the right upper lobe, image 2-27 which is likely artifactual secondary to   motion artifacts. Noconsolidation.  PLEURA: No pleural effusion.  VESSELS: Ascending thoracic aorta measures 3.8 cm, the pulmonary trunk   3.6 cm. No central pulmonary arterial filling defects. Coronary artery   calcifications are present and aortic valvular calcifications.  HEART: Heart size is enlarged.  Minimal pericardial fluid.  MEDIASTINUM AND GOMEZ: No lymphadenopathy.  CHEST WALL AND LOWER NECK: Thyroid gland is within normal limits.    ABDOMEN AND PELVIS:  LIVER: Within normal limits. No focal lesions.  BILEDUCTS: Normal caliber.  GALLBLADDER: Within normal limits. No calcified calculi  SPLEEN: Within normal limits. No focal lesions.  PANCREAS: Normal appearance of the mildly atrophic pancreas. No ductal   dilatation.  ADRENALS: Within normal limits.  KIDNEYS/URETERS: Numerous subcentimeter cortical hypodensities in both   kidneys are too small to characterize. No hydroureteronephrosis or   nephroureterolithiasis.    BLADDER: Within normal limits.  REPRODUCTIVE ORGANS: Prostate within normal limits.    BOWEL: No bowel obstruction. Appendix is not visualized. Scattered   diverticula in the sigmoid colon without acute diverticulitis.  PERITONEUM: No ascites. No loculated fluid collections, no   pneumoperitoneum.  VESSELS: Normal caliber of the abdominal aorta. Mild atherosclerotic   changes. Calcific plaques at the origin of the celiac axis and SMA and   the origin of the renal arteries which are patent.  RETROPERITONEUM/LYMPH NODES: No lymphadenopathy.  ABDOMINAL WALL: Gluteal muscle hypotrophy on the right.  BONES: Spinal fusion hardware with posterior laminectomy L4, L5 and S1.   Multilevel discogenic degenerative disease predominantly involving the   lumbar spine and spondylosis in the lower thoracic spine. No lytic or   blastic bony lesions of destruction.  Rudimentary left first rib. Acute or recently acute rib fractures of the   of the left eighth, ninth and 10th ribs with mild placement.    IMPRESSION:  No evidence of an obvious infectious focus in the chest, abdomen or   pelvis to account for the patient's symptoms of sepsis.    Acute mildly displaced rib fractures left lateral 8th through 10th rib.    < end of copied text >      DVT PPX: eliquis     Advance Directive: full code     Disposition: IV hydration

## 2022-12-02 NOTE — PHYSICAL THERAPY INITIAL EVALUATION ADULT - GENERAL OBSERVATIONS, REHAB EVAL
Pt. received supine in bed + alarm + tele agreeable to PT. Bed mob with Min A, sit to stand and amb with RW Min A 20 ft. with unsteady gait.

## 2022-12-02 NOTE — PATIENT PROFILE ADULT - FALL HARM RISK - HARM RISK INTERVENTIONS

## 2022-12-03 ENCOUNTER — TRANSCRIPTION ENCOUNTER (OUTPATIENT)
Age: 77
End: 2022-12-03

## 2022-12-03 VITALS — TEMPERATURE: 97 F

## 2022-12-03 LAB
ALBUMIN SERPL ELPH-MCNC: 2.5 G/DL — LOW (ref 3.3–5)
ALP SERPL-CCNC: 71 U/L — SIGNIFICANT CHANGE UP (ref 40–120)
ALT FLD-CCNC: 32 U/L — SIGNIFICANT CHANGE UP (ref 12–78)
ANION GAP SERPL CALC-SCNC: 6 MMOL/L — SIGNIFICANT CHANGE UP (ref 5–17)
AST SERPL-CCNC: 31 U/L — SIGNIFICANT CHANGE UP (ref 15–37)
BILIRUB SERPL-MCNC: 0.8 MG/DL — SIGNIFICANT CHANGE UP (ref 0.2–1.2)
BUN SERPL-MCNC: 17 MG/DL — SIGNIFICANT CHANGE UP (ref 7–23)
CALCIUM SERPL-MCNC: 8.5 MG/DL — SIGNIFICANT CHANGE UP (ref 8.5–10.1)
CHLORIDE SERPL-SCNC: 113 MMOL/L — HIGH (ref 96–108)
CO2 SERPL-SCNC: 22 MMOL/L — SIGNIFICANT CHANGE UP (ref 22–31)
CREAT SERPL-MCNC: 0.82 MG/DL — SIGNIFICANT CHANGE UP (ref 0.5–1.3)
EGFR: 89 ML/MIN/1.73M2 — SIGNIFICANT CHANGE UP
GLUCOSE SERPL-MCNC: 95 MG/DL — SIGNIFICANT CHANGE UP (ref 70–99)
HCT VFR BLD CALC: 40.8 % — SIGNIFICANT CHANGE UP (ref 39–50)
HGB BLD-MCNC: 13.3 G/DL — SIGNIFICANT CHANGE UP (ref 13–17)
MCHC RBC-ENTMCNC: 32.6 GM/DL — SIGNIFICANT CHANGE UP (ref 32–36)
MCHC RBC-ENTMCNC: 33.1 PG — SIGNIFICANT CHANGE UP (ref 27–34)
MCV RBC AUTO: 101.5 FL — HIGH (ref 80–100)
PLATELET # BLD AUTO: 90 K/UL — LOW (ref 150–400)
POTASSIUM SERPL-MCNC: 4.4 MMOL/L — SIGNIFICANT CHANGE UP (ref 3.5–5.3)
POTASSIUM SERPL-SCNC: 4.4 MMOL/L — SIGNIFICANT CHANGE UP (ref 3.5–5.3)
PROT SERPL-MCNC: 6.1 GM/DL — SIGNIFICANT CHANGE UP (ref 6–8.3)
RBC # BLD: 4.02 M/UL — LOW (ref 4.2–5.8)
RBC # FLD: 14.4 % — SIGNIFICANT CHANGE UP (ref 10.3–14.5)
SODIUM SERPL-SCNC: 141 MMOL/L — SIGNIFICANT CHANGE UP (ref 135–145)
WBC # BLD: 9.01 K/UL — SIGNIFICANT CHANGE UP (ref 3.8–10.5)
WBC # FLD AUTO: 9.01 K/UL — SIGNIFICANT CHANGE UP (ref 3.8–10.5)

## 2022-12-03 PROCEDURE — 99239 HOSP IP/OBS DSCHRG MGMT >30: CPT

## 2022-12-03 RX ORDER — OXYCODONE HYDROCHLORIDE 5 MG/1
1 TABLET ORAL
Refills: 0 | DISCHARGE
Start: 2022-12-03

## 2022-12-03 RX ORDER — LEVETIRACETAM 250 MG/1
2 TABLET, FILM COATED ORAL
Qty: 0 | Refills: 0 | DISCHARGE

## 2022-12-03 RX ORDER — OXYCODONE HYDROCHLORIDE 5 MG/1
1 TABLET ORAL
Qty: 0 | Refills: 0 | DISCHARGE

## 2022-12-03 RX ORDER — APIXABAN 2.5 MG/1
1 TABLET, FILM COATED ORAL
Qty: 0 | Refills: 0 | DISCHARGE

## 2022-12-03 RX ORDER — METOPROLOL TARTRATE 50 MG
1 TABLET ORAL
Qty: 0 | Refills: 0 | DISCHARGE

## 2022-12-03 RX ORDER — ASPIRIN/CALCIUM CARB/MAGNESIUM 324 MG
1 TABLET ORAL
Qty: 0 | Refills: 0 | DISCHARGE

## 2022-12-03 RX ORDER — AMIODARONE HYDROCHLORIDE 400 MG/1
1 TABLET ORAL
Qty: 0 | Refills: 0 | DISCHARGE

## 2022-12-03 RX ORDER — METHOTREXATE 2.5 MG/1
8 TABLET ORAL
Qty: 0 | Refills: 0 | DISCHARGE

## 2022-12-03 RX ORDER — ATORVASTATIN CALCIUM 80 MG/1
1 TABLET, FILM COATED ORAL
Qty: 0 | Refills: 0 | DISCHARGE

## 2022-12-03 RX ORDER — ACETAMINOPHEN 500 MG
2 TABLET ORAL
Qty: 0 | Refills: 0 | DISCHARGE
Start: 2022-12-03

## 2022-12-03 RX ORDER — FOLIC ACID 0.8 MG
1 TABLET ORAL
Qty: 0 | Refills: 0 | DISCHARGE

## 2022-12-03 RX ADMIN — Medication 1 MILLIGRAM(S): at 10:35

## 2022-12-03 RX ADMIN — APIXABAN 5 MILLIGRAM(S): 2.5 TABLET, FILM COATED ORAL at 10:35

## 2022-12-03 RX ADMIN — AMIODARONE HYDROCHLORIDE 100 MILLIGRAM(S): 400 TABLET ORAL at 10:36

## 2022-12-03 RX ADMIN — LEVETIRACETAM 500 MILLIGRAM(S): 250 TABLET, FILM COATED ORAL at 10:38

## 2022-12-03 RX ADMIN — Medication 81 MILLIGRAM(S): at 10:35

## 2022-12-03 RX ADMIN — OXYCODONE HYDROCHLORIDE 10 MILLIGRAM(S): 5 TABLET ORAL at 10:44

## 2022-12-03 RX ADMIN — SODIUM CHLORIDE 75 MILLILITER(S): 9 INJECTION INTRAMUSCULAR; INTRAVENOUS; SUBCUTANEOUS at 05:40

## 2022-12-03 RX ADMIN — Medication 50 MILLIGRAM(S): at 10:35

## 2022-12-03 NOTE — DISCHARGE NOTE PROVIDER - PROVIDER TOKENS
PROVIDER:[TOKEN:[[id number]],FOLLOWUP:[1 week]],PROVIDER:[TOKEN:[[id number]],FOLLOWUP:[2 weeks]]

## 2022-12-03 NOTE — DISCHARGE NOTE PROVIDER - NS AS DC PROVIDER CONTACT Y/N MULTI
GASTROENTEROLOGY FOLLOW-UP    SUBJECTIVE:  The patient is seen in follow up for a history of LLQ pain.  Patient is seen in ICU. Patient is more awake today, reports no abdominal pain or discomfort, or SOB. She had 2 BM yesterday, and small, narrow specks of stools today. She was taken off BIPAP early in the morning, and currently is on Oxymask on 5 L O2. Patient denies any nausea, vomiting, or abdominal pain. She ate only less than a quarter from her breakfast.       OBJECTIVE:     VITAL SIGNS:    Visit Vitals   • /67 (BP Location: [facility], Patient Position: Semi-Godinez's)   • Pulse 88   • Temp 97.8 °F (36.6 °C) (Temporal Artery)   • Resp 20   • Ht 5' 3\" (1.6 m)   • Wt 61.2 kg   • SpO2 94%   • BMI 23.9 kg/m2         Physical Exam:  General:  Alert, no acute distress, on Oxymask at 5 L O2/min  HEENT:  Head atraumatic, normocephalic.  Moist oral mucous membranes.  Sclerae nonicteric.   Neck:  Supple, no JVD (jugular venous distension).  Trachea midline.  Chest/Lungs:  Normal effort.  Symmetrical expansion.  Rhonchi throughout.  Cardiovascular:  Irregular rhythm, atrial fibrillation control rhythm.  S1, S2  Abdomen:  Normoactive BS (bowel sounds).  Abdomen is round, obese, protuberant, soft, nontender, nondistended.  No hepatosplenomegaly.  Neurologic:  No focal neurological deficit.  A&O (Alert and oriented) x3.   Skin:  Warm, dry, intact.  No rashes.         MEDICATIONS:    • digoxin  500 mcg Intravenous Once   • methylPREDNISolone  125 mg Intravenous 4 times per day   • furosemide  20 mg Intravenous BID   • ceFEPime (MAXIPIME) IVPB for Most Indications  1,000 mg Intravenous 3 times per day   • diltiazem  30 mg Oral 4 times per day   • VANCOMYCIN - PHARMACIST MONITORED   Does not apply See Admin Instructions   • vancomycin (VANCOCIN) IVPB  1,500 mg Intravenous 2 times per day   • albuterol-ipratropium 2.5 mg/0.5 mg  3 mL Nebulization Q4H Resp   • polyethylene glycol  17 g Oral BID   • Calcium polycarbophil  1,250  mg Oral BID   • sodium chloride (PF)  2 mL Injection 2 times per day   • enoxaparin (LOVENOX) injection  40 mg Subcutaneous Q24H   • fluticasone-salmeterol  1 puff Inhalation BID Resp   • tiotropium  18 mcg Inhalation Daily Resp         LABS/DATA:    Recent Labs  Lab 03/03/17  0451 03/02/17  1153 03/02/17  0454 03/01/17  0740 02/27/17  0522 02/26/17  0920   WBC 3.8* 7.8 7.0 8.8 6.6 5.6   HGB 12.5 12.2 12.0 13.1 13.1 14.5   HCT 38.3 37.7 36.8 40.4 40.7 43.3    155 118* 145 134* 161     Recent Labs  Lab 03/03/17  0451 03/02/17  1726 03/02/17  1153 03/02/17  0454 03/01/17  1500 03/01/17  0740 02/27/17 0522 02/26/17 2003 02/26/17  0920   SODIUM 144  --  142 144  --  140 138  --  134*   POTASSIUM 3.4 3.9 3.8 4.2 4.0 3.4 4.1 4.4 3.8   CHLORIDE 101  --  105 108*  --  103 103  --  96*   CO2 35*  --  33* 29  --  33* 27  --  28   BUN 26*  --  23* 25*  --  15 13  --  23*   CREATININE 0.45*  --  0.52 0.47*  --  0.46* 0.59  --  0.94   GLUCOSE 128*  --  128* 102*  --  118* 120*  --  121*   CALCIUM 8.6  --  8.2* 8.3*  --  8.2* 8.0*  --  8.4   MG  --   --   --  2.3  --  1.8 2.0  --   --    GFRNA >90  --  >90 >90  --  >90 87  --  58   GFRA >90  --  >90 >90  --  >90 >90  --  67   ALBUMIN  --   --   --   --   --   --   --   --  3.6   AST  --   --   --   --   --   --   --   --  29   GPT  --   --   --   --   --   --   --   --  19   ALKPT  --   --   --   --   --   --   --   --  89   BILIRUBIN  --   --   --   --   --   --   --   --  0.3         ASSESSMENT AND PLAN:   MARCK pain - s/p colonoscopy 2/28/17 to the mid transverse colon demonstrating severe diverticulosis with significant luminal narrowing in the sigmoid and moderate pandiverticulosis. No diverticulitis.  She should continue on a high fiber diet with 2 FiberCon pills BID and Miralax 17g BID when able to eat.   She was on BIPAP all day yesterday and overnight. Today she was placed on Oxymask 5 L O2, with no complaint of SOB.    She will likely need BE or CT Colonography  to clear the right side of the colon given incomplete colonoscopy. This procedure will be done when patient is clinically stable, with less O2 requirements.     Jazmyn Rayo NP    I, CLEVELAND Parkinson, have performed the History of Present Illness and have discussed the above case including treatment plan with Dr. Rodriguez.     Patient was interviewed and examined independently after discussion with CLEVELAND Parkinson. I agree with above note, assessment and plan.  ABDOMEN: Soft, no tenderness, no masses, no palpable hepatosplenomegaly, no complicated hernias, bowel sounds appreciated.   Patient with severe diverticulosis, diverticular stricture on colonoscopy that was incomplete to transverse colon.  Patient has recurrent abdominal pain, mostly related to her diverticular disease, although no evidence of diverticulitis at this point.  She had no pain today, having bowel movements, and tolerating diet. She is slowly improving from respiratory point of view but still in mild respiratory distress.  Continue same treatment with bowel regimen and follow clinically.  Either barium enema or CT Colonography when patient is stable enough to do either one of these tests.   Yes

## 2022-12-03 NOTE — DISCHARGE NOTE PROVIDER - NSDCFUSCHEDAPPT_GEN_ALL_CORE_FT
[facility]  [facility] [address] [facility]  Scheduled Appointment: 12/19/2022

## 2022-12-03 NOTE — DISCHARGE NOTE PROVIDER - NSDCMRMEDTOKEN_GEN_ALL_CORE_FT
acetaminophen 325 mg oral tablet: 2 tab(s) orally every 6 hours, As needed, Temp greater or equal to 38C (100.4F)  amiodarone 100 mg oral tablet: 1 tab(s) orally once a day    Aspirin Enteric Coated 81 mg oral delayed release tablet: 1 tab(s) orally once a day    atorvastatin 80 mg oral tablet: 1 tab(s) orally once a day (at bedtime)  Eliquis 5 mg oral tablet: 1 tab(s) orally 2 times a day    folic acid 1 mg oral tablet: 1 tab(s) orally once a day    levETIRAcetam 250 mg oral tablet: 2 tab(s) orally 2 times a day    methotrexate 2.5 mg oral tablet: 8 tab(s) orally once a week on tuesdays    Metoprolol Tartrate 50 mg oral tablet: 1 tab(s) orally 2 times a day     acetaminophen 325 mg oral tablet: 2 tab(s) orally every 6 hours, As needed, Temp greater or equal to 38C (100.4F)  amiodarone 100 mg oral tablet: 1 tab(s) orally once a day    Aspirin Enteric Coated 81 mg oral delayed release tablet: 1 tab(s) orally once a day    atorvastatin 80 mg oral tablet: 1 tab(s) orally once a day (at bedtime)  Eliquis 5 mg oral tablet: 1 tab(s) orally 2 times a day    folic acid 1 mg oral tablet: 1 tab(s) orally once a day    levETIRAcetam 250 mg oral tablet: 2 tab(s) orally 2 times a day    methotrexate 2.5 mg oral tablet: 8 tab(s) orally once a week on tuesdays    Metoprolol Tartrate 50 mg oral tablet: 1 tab(s) orally 2 times a day    oxyCODONE 10 mg oral tablet: 1 tab(s) orally every 6 hours, As Needed for mod to severe pain

## 2022-12-03 NOTE — DISCHARGE NOTE NURSING/CASE MANAGEMENT/SOCIAL WORK - NSDCPEFALRISK_GEN_ALL_CORE
For information on Fall & Injury Prevention, visit: [email or web address] OR  [email or web address] OR  https://www.cdc.gov/steadi/patient.html

## 2022-12-03 NOTE — DISCHARGE NOTE PROVIDER - CARE PROVIDER_API CALL
Bruno Palacio  MEDICINE  [address] [place] [address]  Phone: [phone]  Fax: [phone]  Follow Up Time: 1 week    Gavino Petit)  Cardiology; Interventional Cardiology  [address] [facility] [place] [address]  Phone: [phone]  Fax: [phone]  Follow Up Time: 2 weeks

## 2022-12-03 NOTE — DISCHARGE NOTE NURSING/CASE MANAGEMENT/SOCIAL WORK - PATIENT PORTAL LINK FT
You can access the FollowMyHealth Patient Portal offered by [facility] by registering at the following website: [email or web address]. By joining [facility]’s FollowMyHealth portal, you will also be able to view your health information using other applications (apps) compatible with our system.

## 2022-12-03 NOTE — DISCHARGE NOTE PROVIDER - NSDCCPCAREPLAN_GEN_ALL_CORE_FT
PRINCIPAL DISCHARGE DIAGNOSIS  Diagnosis: SIRS (systemic inflammatory response syndrome)  Assessment and Plan of Treatment: No infection found  Likely reactive to COVID Booster  F/u with PCP      SECONDARY DISCHARGE DIAGNOSES  Diagnosis: Atrial fibrillation with rapid ventricular response  Assessment and Plan of Treatment: Now HR improved  C/w Amiodarone,  metoprolol and Eliquis    Diagnosis: Orthostatic hypotension  Assessment and Plan of Treatment: likely due to dehydration  Now improved

## 2022-12-03 NOTE — DISCHARGE NOTE PROVIDER - HOSPITAL COURSE
78 y/o Male with CVA and residual Left sided weakness, seizure disorder, Arthritis, Afib presented to the ED BIBA from home with complain of weakness and fever, His home tmax 102.5 for the past couple days. Patient received COVID booster day prior. Pt Denied  SOB, dysuria or denies diarrhea. No chest pain, no palpitation. Family also reported that Pt was confused, was found at the edge of the bed, no reported fall.   In ED: febrile at 101.3, tachycardic in 130s, AFIB. BP elevated, No Hypoxia. Labs  with mild leukocytosis, min elevated Total Bili at 1.6. Trop neg.  CT chest/abd/pelvic was done, no source of infection, noted to have acute L rib Fx 8-10th. UA neg. Pt was given IVF bolus. UCX and BCX sent and are negative. No Source of infection, SIRS likely reactive to COVID booster. Fevers resolved. Pt  received 1 dose of Zosyn in ED, was monitored off  Abxs.   Pt was monitored on tele, AFIB, HR improved. Pt c/o feeling dizzy and found to have + Orthostatics, S/p IVF hydration, orthostatics improved, Pt is asymptomatic now, ambulate , no dizziness. Pt was evaluated by PT,  home PT was recommended.   Pt was also evaluated by Trauma team for rib Fx,  to c/w pain meds PRN was recommended   Today Pt was seen and examined, reports feels much better, no pain, no Dizziness, wants to go home. Meds and outPt f/u discussed.      Vital Signs Last 24 Hrs  T(C): 36.3 (03 Dec 2022 09:30), Max: 36.7 (02 Dec 2022 16:17)  T(F): 97.4 (03 Dec 2022 09:30), Max: 98.1 (02 Dec 2022 16:17)  HR: 84 (02 Dec 2022 20:15) (73 - 84)  BP: 130/74 (02 Dec 2022 16:17) (130/74 - 130/74)  RR: 18 (02 Dec 2022 16:17) (18 - 18)  SpO2: 99% (02 Dec 2022 16:17) (99% - 99%)      PHYSICAL EXAM:  General: Well developed;  in no acute distress  Eyes:  EOMI; conjunctiva and sclera clear  Head: Normocephalic; atraumatic  ENMT: No nasal discharge; airway clear  Neck: Supple; non tender; no masses  Respiratory: No wheezes, rales or rhonchi  Cardiovascular: Irregular rate and rhythm. S1 and S2 Normal;   Gastrointestinal: Soft non-tender non-distended; Normal bowel sounds  Genitourinary: No  suprapubic  tenderness  Extremities: No edema  Vascular: Peripheral pulses palpable 2+ bilaterally  Neurological: Alert and oriented x3, mild L sided UE/LE weakness, speech is clear   Musculoskeletal: Normal muscle tone, without deformities  Psychiatric: Cooperative and appropriate      A/P:  78 y/o Male with CVA and residual Left sided weakness, seizure disorder, Arthritis, Afib admitted:     # Febrile syndrome, SIRS  likely reactive to COVID booster   # Generalized fatigue due to above   febrile in ER, now resolved,  no need for Abx   CT head, chest, abdomen and pelvis - no source of infection   UA - negative, BCX and UCX neg       # Orthostatic hypotension likely due to dehydration after recent COVID booster  No further dizziness  S/p Bolus and gentle IVF hydration      # Mildly elevated bilirubin   - trended down to normal   - CT abdomen - liver/gallbladder - wnl   - RUQ US  wnl     # Chronic Afib RVR on admission   -patient was in Afib with RVR in ED, most likely due to fever  now rate controlled   - cont. eliquis, amiodarone and  BB     # Seizure disorder  - cont. keppra at home dose     # Multiple rib fractures  pt. denies pain   -sub acute  -follow clinically  - tylenol for pain   - PT    # Arthritis on methotrexate, continue  C/w PO Oxy as Rx    Dispo; stable for d/c home, Pt and wife declined HC      78 y/o Male with CVA and residual Left sided weakness, seizure disorder, Arthritis, Afib presented to the ED BIBA from home with complain of weakness and fever, His home tmax 102.5 for the past couple days. Patient received COVID booster day prior. Pt Denied  SOB, dysuria or denies diarrhea. No chest pain, no palpitation. Family also reported that Pt was confused, was found at the edge of the bed, no reported fall.   In ED: febrile at 101.3, tachycardic in 130s, AFIB. BP elevated, No Hypoxia. Labs  with mild leukocytosis, min elevated Total Bili at 1.6. Trop neg.  CT chest/abd/pelvic was done, no source of infection, noted to have acute L rib Fx 8-10th. UA neg. Pt was given IVF bolus. UCX and BCX sent and are negative. No Source of infection, SIRS likely reactive to COVID booster. Fevers resolved. Pt  received 1 dose of Zosyn in ED, was monitored off  Abxs.   Pt was monitored on tele, AFIB, HR improved. Pt c/o feeling dizzy and found to have + Orthostatics, S/p IVF hydration, orthostatics improved, Pt is asymptomatic now, ambulate , no dizziness. Pt was evaluated by PT,  home PT was recommended.   Pt was also evaluated by Trauma team for rib Fx,  to c/w pain meds PRN was recommended   Today Pt was seen and examined, reports feels much better, no pain, no Dizziness, wants to go home. Meds and outPt f/u discussed.      Vital Signs Last 24 Hrs  T(C): 36.3 (03 Dec 2022 09:30), Max: 36.7 (02 Dec 2022 16:17)  T(F): 97.4 (03 Dec 2022 09:30), Max: 98.1 (02 Dec 2022 16:17)  HR: 84 (02 Dec 2022 20:15) (73 - 84)  BP: 130/74 (02 Dec 2022 16:17) (130/74 - 130/74)  RR: 18 (02 Dec 2022 16:17) (18 - 18)  SpO2: 99% (02 Dec 2022 16:17) (99% - 99%)      PHYSICAL EXAM:  General: Well developed;  in no acute distress  Eyes:  EOMI; conjunctiva and sclera clear  Head: Normocephalic; atraumatic  ENMT: No nasal discharge; airway clear  Neck: Supple; non tender; no masses  Respiratory: No wheezes, rales or rhonchi  Cardiovascular: Irregular rate and rhythm. S1 and S2 Normal;   Gastrointestinal: Soft non-tender non-distended; Normal bowel sounds  Genitourinary: No  suprapubic  tenderness  Extremities: No edema  Vascular: Peripheral pulses palpable 2+ bilaterally  Neurological: Alert and oriented x3, mild L sided UE/LE weakness, speech is clear   Musculoskeletal: Normal muscle tone, without deformities  Psychiatric: Cooperative and appropriate      A/P:  78 y/o Male with CVA and residual Left sided weakness, seizure disorder, Arthritis, Afib admitted:     # Febrile syndrome, SIRS  likely reactive to COVID booster   # Generalized fatigue due to above   febrile in ER, now resolved,  no need for Abx   CT head, chest, abdomen and pelvis - no source of infection   UA - negative, BCX and UCX neg       # Orthostatic hypotension likely due to dehydration after recent COVID booster  No further dizziness  S/p Bolus and gentle IVF hydration        # Confusion likely metabolic encephalopathy, resolved  In settings of fever   CT head neg for acute findings       # Mildly elevated bilirubin   - trended down to normal   - CT abdomen - liver/gallbladder - wnl   - RUQ US  wnl     # Chronic Afib RVR on admission   -patient was in Afib with RVR in ED, most likely due to fever  now rate controlled   - cont. eliquis, amiodarone and  BB     # Seizure disorder  - cont. keppra at home dose     # Multiple rib fractures  pt. denies pain   -sub acute  -follow clinically  - tylenol for pain   - PT    # Arthritis on methotrexate, continue  C/w PO Oxy as Rx    Dispo; stable for d/c home, Pt and wife declined HC

## 2022-12-06 LAB
CULTURE RESULTS: SIGNIFICANT CHANGE UP
CULTURE RESULTS: SIGNIFICANT CHANGE UP
SPECIMEN SOURCE: SIGNIFICANT CHANGE UP
SPECIMEN SOURCE: SIGNIFICANT CHANGE UP

## 2022-12-08 ENCOUNTER — OFFICE (OUTPATIENT)
Dept: URBAN - METROPOLITAN AREA CLINIC 102 | Facility: CLINIC | Age: 77
Setting detail: OPHTHALMOLOGY
End: 2022-12-08

## 2022-12-08 DIAGNOSIS — I69.954 HEMIPLEGIA AND HEMIPARESIS FOLLOWING UNSPECIFIED CEREBROVASCULAR DISEASE AFFECTING LEFT NON-DOMINANT SIDE: ICD-10-CM

## 2022-12-08 DIAGNOSIS — R65.10 SYSTEMIC INFLAMMATORY RESPONSE SYNDROME (SIRS) OF NON-INFECTIOUS ORIGIN WITHOUT ACUTE ORGAN DYSFUNCTION: ICD-10-CM

## 2022-12-08 DIAGNOSIS — I48.0 PAROXYSMAL ATRIAL FIBRILLATION: ICD-10-CM
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
## 2022-12-08 DIAGNOSIS — R50.2 DRUG INDUCED FEVER: ICD-10-CM

## 2022-12-08 DIAGNOSIS — G40.909 EPILEPSY, UNSPECIFIED, NOT INTRACTABLE, WITHOUT STATUS EPILEPTICUS: ICD-10-CM

## 2022-12-08 DIAGNOSIS — E86.0 DEHYDRATION: ICD-10-CM
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
## 2022-12-08 DIAGNOSIS — L40.50 ARTHROPATHIC PSORIASIS, UNSPECIFIED: ICD-10-CM

## 2022-12-08 DIAGNOSIS — I48.91 UNSPECIFIED ATRIAL FIBRILLATION: ICD-10-CM

## 2022-12-08 DIAGNOSIS — T50.B95A ADVERSE EFFECT OF OTHER VIRAL VACCINES, INITIAL ENCOUNTER: ICD-10-CM

## 2022-12-08 DIAGNOSIS — I95.2 HYPOTENSION DUE TO DRUGS: ICD-10-CM

## 2022-12-08 DIAGNOSIS — G93.41 METABOLIC ENCEPHALOPATHY: ICD-10-CM

## 2022-12-08 PROCEDURE — NO SHOW FE NO SHOW FEE: Performed by: OPHTHALMOLOGY

## 2022-12-19 ENCOUNTER — APPOINTMENT (OUTPATIENT)
Dept: ORTHOPEDIC SURGERY | Facility: CLINIC | Age: 77
End: 2022-12-19

## 2023-01-27 NOTE — ED PROVIDER NOTE - CCCP TRG CHIEF CMPLNT
Loy Limon is a 67 year old male that presents in clinic today for the following:     Chief Complaint   Patient presents with     Follow Up     Pt here for a 3 month follow up; pt reporting memory loss for about a month        The patient's allergies and medications were reviewed. The patient's vitals were obtained without incident. The patient does not have any other questions for the provider.     Tara Agudelo, EMT at 12:51 PM on 1/27/2023.  Primary Care Clinic: [phone]  
altered mental status

## 2023-03-21 NOTE — PATIENT PROFILE ADULT - PATIENT'S SEXUAL ORIENTATION
Pt brought to ED by mother with c/o fever and emesis. Mother states pt started having fever recently, had emesis episode this morning, no blood in it, has constipation hx, was loose stools but probably because recently given myralax, was born at 36w4d, no NICU stays. Mother states pt is acting normal. Pt resting on stretcher with mother, RR even and non labored, call light within reach, Dr Claire Santamaria at bedside. Heterosexual

## 2023-04-14 NOTE — PROGRESS NOTE ADULT - SUBJECTIVE AND OBJECTIVE BOX
HPI:  Patient is a 76y old  Male who presents with a chief complaint of left knee pain s/p left total knee replacement 21.    Consulted by Dr. Etienne   for VTE prophylaxis, risk stratification, and anticoagulation management.    PAST MEDICAL & SURGICAL HISTORY:  Atrial fibrillation  paroxysmal    Cardiac arrhythmia  PAT    Cerebrovascular accident   deficit: balance dizziness and weakness both hands    Seizure disorder  last seizure     Psoriatic arthritis    Rib fracture  left side  Last week of 2021    History of lumbar spinal fusion    H/O total knee replacement, right  &#x27;s    History of tonsillectomy    History of cataract surgery          Interval History:  : Patient seen at bedside in PACU. urretnly in rapid AF versus SVT awaiting adenosine IV- management per Dr. Petit. In no apparent distress. Explained will resume Eliquis tonight. Patient denies any questions at this time.  21: Patient seen at bedside with PT. He was OOB to chair with rate 140's steady AF. Asymptomatic. About to get up again with PT and did ambulate in hallway with walker. Advised of resumption of Eliquis last night. Patient verbalized understanding.  21: Patient seen at bedside OOB to chair with intermittent dozing, but arousable. Unsure as far as rehab versus home as of yet. Still on usual eliquis 5 mg twice daily for AF. HH stable.  21: Patient seen at bedside OOB to chair on 3 [address]. He is preparing to go home today. Advised of lab coming to home to draw blood work next week. Patient verbalized understanding. Will follow up out-patient   21: Patient seen at bedside OOB to chair. He declined going home would rather rehab as he doesn't feel steady and "What if I fall at home." He will be going to [facility] today.    BMI: 28.2    CrCl: pending labs    Incision Time: 1306  Procedure End Time:1443  EBL: 150    Caprini VTE Risk Score:  CAPRINI SCORE  AGE RELATED RISK FACTORS                                                       MOBILITY RELATED FACTORS  [ ] Age 41-60 years                                            (1 Point)                  [ ] Bed rest /restricted mobility                      (1 Point)  [ ] Age: 61-74 years                                           (2 Points)                [ ] Plaster cast                                                   (2 Points)  [ ]x Age= 75 years                                              (3 Points)                 [ ] Bed bound for more than 72 hours                   (2 Points)    DISEASE RELATED RISK FACTORS                                               GENDER SPECIFIC FACTORS  [ ] Edema in the lower extremities                       (1 Point)           [ ] Pregnancy                                                            (1 Point)  [ ] Varicose veins                                               (1 Point)                  [ ] Post-partum < 6 weeks                                      (1 Point)             [x ] BMI > 25 Kg/m2                                            (1 Point)                  [ ] Hormonal therapy or oral contraception       (1 Point)                 [ ] Sepsis (in the previous month)                        (1 Point)             [ ] History of pregnancy complications                (1Point)  [ ] Pneumonia or serious lung disease                                             [ ] Unexplained or recurrent  (=/>3), premature                                 (In the previous month)                               (1 Point)                birth with toxemia or growth-restricted infant (1 Point)  [ ] Abnormal pulmonary function test            (1 Point)                                   SURGERY RELATED RISK FACTORS  [ ] Acute myocardial infarction                       (1 Point)                  [ ]  Section                                         (1 Point)  [ ] Congestive heart failure (in the previous month) (1 Point)   [ ] Minor surgery   lasting <45 minutes       (1 Point)   [ ] Inflammatory bowel disease                             (1 Point)          [ ] Arthroscopic surgery                                  (2 Points)  [ ] Central venous access                                    (2 Points)            [ ] General surgery lasting >45 minutes      (2 Points)       [ ] Stroke (in the previous month)                  (5 Points)            [x ] Elective major lower extremity arthroplasty (5 Points)                                   [  ] Malignancy (present or past include skin melanoma                                          but exclude  basal skin cell)    (2 points)                                      TRAUMA RELATED RISK FACTORS                HEMATOLOGY RELATED FACTORS                                  [ ] Fracture of the hip, pelvis, or leg                       (5 Points)  [ ] Prior episodes of VTE                                     (3 Points)          [ ] Acute spinal cord injury (in the previous month)  (5 Points)  [ ] Positive family history for VTE                         (3 Points)       [ ] Paralysis (less than 1 month)                          (5 Points)  [ ] Prothrombin 69870 A                                      (3 Points)         [ ] Multiple Trauma (within 1month)                 (5Points)                                                                                                                                                                [ ] Factor V Leiden                                          (3 Points)                                OTHER RISK FACTORS                          [ ] Lupus anticoagulants                                     (3 Points)                     [ ] BMI > 40                          (1 Point)                                                         [ ] Anticardiolipin antibodies                                (3 Points)                 [ ] Smoking                              (1Point)                                                [ ] High homocysteine in the blood                      (3 Points)                [  ] Diabetes requiring insulin (1point)                         [ ] Other congenital or acquired thrombophilia       (3 Points)          [  ] Chemotherapy                   (1 Point)  [ ] Heparin induced thrombocytopenia                  (3 Points)             [  ] Blood Transfusion                (1 point)                                                                                                             Total Score [    9      ]                                                                                                                                                                                                                                                                                                                                                                                                                                               [id number] Score: 4    IMPROVE Bleeding Risk Score:3.5      Falls Risk:   High ( x )  Mod (  )  Low (  )      FAMILY HISTORY:  FH: stroke (Father)    FH: arthritis (Father)      Denies any personal or familial history of clotting or bleeding disorders.    Allergies    No Known Allergies    Intolerances        REVIEW OF SYSTEMS    (  )Fever	        (  )Constipation	(  )SOB				  (  )Headache   (  )Dysuria  (  )Chills	        (  )Melena	        (  )Dyspnea on exertion (  )Dizziness    (  )Polyuria  (  )Nausea      (  )Hematochezia	(  )Cough                          (  )Syncope      (  )Hematuria  (  )Vomiting   (  )Chest Pain	        (  )Wheezing			  (  )Weakness  (  )Diarrhea    (  )Palpitations	(  )Anorexia			  ( x )Myalgia       (   ) Arthralgia    Pertinent positives in HPI and daily subjective. All other systems negative.    Vital Signs Last 24 Hrs  T(C): 36.8 (21 @ 08:23), Max: 36.8 (21 @ 08:23)  T(F): 98.2 (21 @ 08:23), Max: 98.2 (21 @ 08:23)  HR: 76 (21 @ 08:23) (76 - 120)  BP: 124/72 (21 @ 08:23) (109/65 - 124/72)  BP(mean): --  RR: 18 (21 @ 08:23) (18 - 18)  SpO2: 99% (21 @ 08:23) (98% - 99%)    PHYSICAL EXAM:    Constitutional: Appears Well    Neurological: A& O x 3    Skin: Warm    Respiratory and Thorax: normal effort; Breath sounds: normal; No rales/wheezing/rhonchi  	  Cardiovascular: S1, S2, irregular, NMBR	MP: rate  137bpm AF    Gastrointestinal: BS + x 4Q, nontender	    Genitourinary:  Bladder nondistended, nontender    Musculoskeletal:   General Right:   no muscle/joint tenderness,   normal tone, no joint swelling,   ROM: full	    General Left:   no muscle/joint tenderness,   normal tone, no joint swelling,   ROM: limited    Knee:  Left: Incision: ; Dressing CDI    Lower extrems:   Right: no calf tenderness              negative misael's sign               + pedal pulses    Left:   no calf tenderness              negative misael's sign               + pedal pulses    		    MEDICATIONS  (STANDING):  acetaminophen   Tablet .. 975 milliGRAM(s) Oral every 8 hours  aMIOdarone    Tablet 400 milliGRAM(s) Oral three times a day  apixaban 5 milliGRAM(s) Oral every 12 hours  ascorbic acid 500 milliGRAM(s) Oral two times a day  calcium carbonate 1250 mG  + Vitamin D (OsCal 500 + D) 1 Tablet(s) Oral three times a day  celecoxib 200 milliGRAM(s) Oral every 12 hours  ferrous    sulfate 325 milliGRAM(s) Oral daily  folic acid 1 milliGRAM(s) Oral daily  levETIRAcetam 500 milliGRAM(s) Oral two times a day  metoprolol tartrate 50 milliGRAM(s) Oral two times a day  multivitamin 1 Tablet(s) Oral daily  pantoprazole    Tablet 40 milliGRAM(s) Oral before breakfast  polyethylene glycol 3350 17 Gram(s) Oral at bedtime  senna 2 Tablet(s) Oral at bedtime    **Current DVT Prophylaxis:    LMWH                   (  )  Heparin SQ           (  )  Coumadin             (  )  Xarelto                  (  )  Eliquis                   ( x )  Venodynes           (x  )  Ambulation          ( x )  UFH                       (  )  ECASA                   (  )  Contraindicated  (  )           .

## 2024-01-15 NOTE — DISCHARGE NOTE NURSING/CASE MANAGEMENT/SOCIAL WORK - NSDCPEELIQUISFU_GEN_ALL_CORE
CLINICAL PHARMACY NOTE - SPECIALTY MEDICATION SERVICE      Aida Sanchez is a 50 y.o.  female enrolled in the Specialty Medication Service. Patient does have a signed CPA on file.      Chart reviewed and patient is compliant with [facility] program requirements. Labs unable to assess, however patient stated during visit with [facility] labs were recently obtained and WNL; No significant concerns noted. Next [facility] visit scheduled/anticipated for 05/06/2023.        Will approve refill for 30 day supply per original specialist's order.      Orders Placed This Encounter    Etanercept 50 MG/ML SOAJ     Sig: Inject 50 mg (1 auto-injector) under the skin weekly     Dispense:  4 mL     Refill:  0          James Humphrey PharmD [facility]  Ambulatory Clinical Pharmacist  Specialty Medication Services  Phone: [phone] option #4  Fax: [phone]     For Pharmacy Admin Tracking Only    Program: [facility]  CPA in place:  Yes  Recommendation Provided To: Patient/Caregiver: 1 via Telephone  Intervention Detail: Refill(s) Provided  Intervention Accepted By: Patient/Caregiver: 1    Time Spent (min): 10     Go for blood tests as directed. Your doctor will do lab tests at regular visits to monitor the effects of this medicine. Please follow up with your doctor and keep your health care provider appointments.

## 2024-03-27 ENCOUNTER — INPATIENT (INPATIENT)
Facility: HOSPITAL | Age: 79
LOS: 2 days | Discharge: NURSING, REHAB OR LONG-TERM CARE | DRG: 690 | End: 2024-03-30
Attending: STUDENT IN AN ORGANIZED HEALTH CARE EDUCATION/TRAINING PROGRAM | Admitting: STUDENT IN AN ORGANIZED HEALTH CARE EDUCATION/TRAINING PROGRAM
Payer: MEDICARE

## 2024-03-27 VITALS
DIASTOLIC BLOOD PRESSURE: 91 MMHG | HEIGHT: 69 IN | WEIGHT: 164.91 LBS | HEART RATE: 113 BPM | OXYGEN SATURATION: 95 % | TEMPERATURE: 99 F | RESPIRATION RATE: 18 BRPM | SYSTOLIC BLOOD PRESSURE: 157 MMHG

## 2024-03-27 DIAGNOSIS — Z96.651 PRESENCE OF RIGHT ARTIFICIAL KNEE JOINT: Chronic | ICD-10-CM

## 2024-03-27 DIAGNOSIS — Z98.49 CATARACT EXTRACTION STATUS, UNSPECIFIED EYE: Chronic | ICD-10-CM

## 2024-03-27 DIAGNOSIS — Z98.1 ARTHRODESIS STATUS: Chronic | ICD-10-CM

## 2024-03-27 DIAGNOSIS — N39.0 URINARY TRACT INFECTION, SITE NOT SPECIFIED: ICD-10-CM

## 2024-03-27 DIAGNOSIS — Z90.89 ACQUIRED ABSENCE OF OTHER ORGANS: Chronic | ICD-10-CM

## 2024-03-27 LAB
ADD ON TEST-SPECIMEN IN LAB: SIGNIFICANT CHANGE UP
ALBUMIN SERPL ELPH-MCNC: 3.6 G/DL — SIGNIFICANT CHANGE UP (ref 3.3–5)
ALP SERPL-CCNC: 96 U/L — SIGNIFICANT CHANGE UP (ref 40–120)
ALT FLD-CCNC: 23 U/L — SIGNIFICANT CHANGE UP (ref 12–78)
ANION GAP SERPL CALC-SCNC: 5 MMOL/L — SIGNIFICANT CHANGE UP (ref 5–17)
ANISOCYTOSIS BLD QL: SLIGHT — SIGNIFICANT CHANGE UP
APPEARANCE UR: CLEAR — SIGNIFICANT CHANGE UP
APTT BLD: 36.1 SEC — HIGH (ref 24.5–35.6)
AST SERPL-CCNC: 20 U/L — SIGNIFICANT CHANGE UP (ref 15–37)
BACTERIA # UR AUTO: ABNORMAL /HPF
BASOPHILS # BLD AUTO: 0.16 K/UL — SIGNIFICANT CHANGE UP (ref 0–0.2)
BASOPHILS NFR BLD AUTO: 1 % — SIGNIFICANT CHANGE UP (ref 0–2)
BILIRUB SERPL-MCNC: 1.2 MG/DL — SIGNIFICANT CHANGE UP (ref 0.2–1.2)
BILIRUB UR-MCNC: NEGATIVE — SIGNIFICANT CHANGE UP
BUN SERPL-MCNC: 24 MG/DL — HIGH (ref 7–23)
CALCIUM SERPL-MCNC: 8.9 MG/DL — SIGNIFICANT CHANGE UP (ref 8.5–10.1)
CAST: 0 /LPF — SIGNIFICANT CHANGE UP (ref 0–4)
CHLORIDE SERPL-SCNC: 106 MMOL/L — SIGNIFICANT CHANGE UP (ref 96–108)
CO2 SERPL-SCNC: 27 MMOL/L — SIGNIFICANT CHANGE UP (ref 22–31)
COLOR SPEC: YELLOW — SIGNIFICANT CHANGE UP
CREAT SERPL-MCNC: 1.02 MG/DL — SIGNIFICANT CHANGE UP (ref 0.5–1.3)
DIFF PNL FLD: ABNORMAL
EGFR: 74 ML/MIN/1.73M2 — SIGNIFICANT CHANGE UP
EOSINOPHIL # BLD AUTO: 0.33 K/UL — SIGNIFICANT CHANGE UP (ref 0–0.5)
EOSINOPHIL NFR BLD AUTO: 2 % — SIGNIFICANT CHANGE UP (ref 0–6)
FLUAV AG NPH QL: SIGNIFICANT CHANGE UP
FLUBV AG NPH QL: SIGNIFICANT CHANGE UP
GLUCOSE BLDC GLUCOMTR-MCNC: 97 MG/DL — SIGNIFICANT CHANGE UP (ref 70–99)
GLUCOSE SERPL-MCNC: 117 MG/DL — HIGH (ref 70–99)
GLUCOSE UR QL: NEGATIVE MG/DL — SIGNIFICANT CHANGE UP
HCT VFR BLD CALC: 36.5 % — LOW (ref 39–50)
HGB BLD-MCNC: 12.2 G/DL — LOW (ref 13–17)
INR BLD: 2.11 RATIO — HIGH (ref 0.85–1.18)
KETONES UR-MCNC: NEGATIVE MG/DL — SIGNIFICANT CHANGE UP
LACTATE SERPL-SCNC: 1 MMOL/L — SIGNIFICANT CHANGE UP (ref 0.7–2)
LEUKOCYTE ESTERASE UR-ACNC: ABNORMAL
LYMPHOCYTES # BLD AUTO: 0.99 K/UL — LOW (ref 1–3.3)
LYMPHOCYTES # BLD AUTO: 6 % — LOW (ref 13–44)
MACROCYTES BLD QL: SLIGHT — SIGNIFICANT CHANGE UP
MANUAL SMEAR VERIFICATION: SIGNIFICANT CHANGE UP
MCHC RBC-ENTMCNC: 33.4 GM/DL — SIGNIFICANT CHANGE UP (ref 32–36)
MCHC RBC-ENTMCNC: 33.8 PG — SIGNIFICANT CHANGE UP (ref 27–34)
MCV RBC AUTO: 101.1 FL — HIGH (ref 80–100)
MONOCYTES # BLD AUTO: 1.97 K/UL — HIGH (ref 0–0.9)
MONOCYTES NFR BLD AUTO: 12 % — SIGNIFICANT CHANGE UP (ref 2–14)
NEUTROPHILS # BLD AUTO: 12.97 K/UL — HIGH (ref 1.8–7.4)
NEUTROPHILS NFR BLD AUTO: 79 % — HIGH (ref 43–77)
NITRITE UR-MCNC: POSITIVE
NRBC # BLD: 0 /100 WBCS — SIGNIFICANT CHANGE UP (ref 0–0)
NRBC # BLD: SIGNIFICANT CHANGE UP /100 WBCS (ref 0–0)
OVALOCYTES BLD QL SMEAR: SLIGHT — SIGNIFICANT CHANGE UP
PH UR: 5.5 — SIGNIFICANT CHANGE UP (ref 5–8)
PLAT MORPH BLD: NORMAL — SIGNIFICANT CHANGE UP
PLATELET # BLD AUTO: 136 K/UL — LOW (ref 150–400)
POIKILOCYTOSIS BLD QL AUTO: SLIGHT — SIGNIFICANT CHANGE UP
POTASSIUM SERPL-MCNC: 4.6 MMOL/L — SIGNIFICANT CHANGE UP (ref 3.5–5.3)
POTASSIUM SERPL-SCNC: 4.6 MMOL/L — SIGNIFICANT CHANGE UP (ref 3.5–5.3)
PROT SERPL-MCNC: 6.8 GM/DL — SIGNIFICANT CHANGE UP (ref 6–8.3)
PROT UR-MCNC: SIGNIFICANT CHANGE UP MG/DL
PROTHROM AB SERPL-ACNC: 23.3 SEC — HIGH (ref 9.5–13)
RBC # BLD: 3.61 M/UL — LOW (ref 4.2–5.8)
RBC # FLD: 14.3 % — SIGNIFICANT CHANGE UP (ref 10.3–14.5)
RBC BLD AUTO: ABNORMAL
RBC CASTS # UR COMP ASSIST: 9 /HPF — HIGH (ref 0–4)
RSV RNA NPH QL NAA+NON-PROBE: SIGNIFICANT CHANGE UP
SARS-COV-2 RNA SPEC QL NAA+PROBE: SIGNIFICANT CHANGE UP
SODIUM SERPL-SCNC: 138 MMOL/L — SIGNIFICANT CHANGE UP (ref 135–145)
SP GR SPEC: 1.02 — SIGNIFICANT CHANGE UP (ref 1–1.03)
SQUAMOUS # UR AUTO: 0 /HPF — SIGNIFICANT CHANGE UP (ref 0–5)
TSH SERPL-MCNC: 1.27 UU/ML — SIGNIFICANT CHANGE UP (ref 0.34–4.82)
UROBILINOGEN FLD QL: 1 MG/DL — SIGNIFICANT CHANGE UP (ref 0.2–1)
WBC # BLD: 16.42 K/UL — HIGH (ref 3.8–10.5)
WBC # FLD AUTO: 16.42 K/UL — HIGH (ref 3.8–10.5)
WBC UR QL: 72 /HPF — HIGH (ref 0–5)

## 2024-03-27 PROCEDURE — 85025 COMPLETE CBC W/AUTO DIFF WBC: CPT

## 2024-03-27 PROCEDURE — 82607 VITAMIN B-12: CPT

## 2024-03-27 PROCEDURE — 70450 CT HEAD/BRAIN W/O DYE: CPT | Mod: 26

## 2024-03-27 PROCEDURE — 36415 COLL VENOUS BLD VENIPUNCTURE: CPT

## 2024-03-27 PROCEDURE — 97162 PT EVAL MOD COMPLEX 30 MIN: CPT | Mod: GP

## 2024-03-27 PROCEDURE — 80048 BASIC METABOLIC PNL TOTAL CA: CPT

## 2024-03-27 PROCEDURE — 85027 COMPLETE CBC AUTOMATED: CPT

## 2024-03-27 PROCEDURE — 97110 THERAPEUTIC EXERCISES: CPT | Mod: GP

## 2024-03-27 PROCEDURE — 70450 CT HEAD/BRAIN W/O DYE: CPT | Mod: MC

## 2024-03-27 PROCEDURE — 93010 ELECTROCARDIOGRAM REPORT: CPT

## 2024-03-27 PROCEDURE — 99285 EMERGENCY DEPT VISIT HI MDM: CPT

## 2024-03-27 PROCEDURE — 82746 ASSAY OF FOLIC ACID SERUM: CPT

## 2024-03-27 PROCEDURE — 99223 1ST HOSP IP/OBS HIGH 75: CPT

## 2024-03-27 PROCEDURE — 82962 GLUCOSE BLOOD TEST: CPT

## 2024-03-27 PROCEDURE — 97530 THERAPEUTIC ACTIVITIES: CPT | Mod: GP

## 2024-03-27 PROCEDURE — 87635 SARS-COV-2 COVID-19 AMP PRB: CPT

## 2024-03-27 PROCEDURE — 71045 X-RAY EXAM CHEST 1 VIEW: CPT | Mod: 26

## 2024-03-27 PROCEDURE — 97116 GAIT TRAINING THERAPY: CPT | Mod: GP

## 2024-03-27 RX ORDER — METHOTREXATE 2.5 MG/1
20 TABLET ORAL
Refills: 0 | Status: DISCONTINUED | OUTPATIENT
Start: 2024-03-27 | End: 2024-03-30

## 2024-03-27 RX ORDER — FOLIC ACID 0.8 MG
1 TABLET ORAL DAILY
Refills: 0 | Status: DISCONTINUED | OUTPATIENT
Start: 2024-03-27 | End: 2024-03-30

## 2024-03-27 RX ORDER — ONDANSETRON 8 MG/1
4 TABLET, FILM COATED ORAL EVERY 8 HOURS
Refills: 0 | Status: DISCONTINUED | OUTPATIENT
Start: 2024-03-27 | End: 2024-03-27

## 2024-03-27 RX ORDER — ACETAMINOPHEN 500 MG
650 TABLET ORAL EVERY 6 HOURS
Refills: 0 | Status: DISCONTINUED | OUTPATIENT
Start: 2024-03-27 | End: 2024-03-27

## 2024-03-27 RX ORDER — LEVETIRACETAM 250 MG/1
2 TABLET, FILM COATED ORAL
Qty: 0 | Refills: 0 | DISCHARGE

## 2024-03-27 RX ORDER — OXYCODONE HYDROCHLORIDE 5 MG/1
10 TABLET ORAL EVERY 8 HOURS
Refills: 0 | Status: DISCONTINUED | OUTPATIENT
Start: 2024-03-27 | End: 2024-03-30

## 2024-03-27 RX ORDER — TAMSULOSIN HYDROCHLORIDE 0.4 MG/1
0.4 CAPSULE ORAL AT BEDTIME
Refills: 0 | Status: DISCONTINUED | OUTPATIENT
Start: 2024-03-27 | End: 2024-03-30

## 2024-03-27 RX ORDER — CEFTRIAXONE 500 MG/1
1000 INJECTION, POWDER, FOR SOLUTION INTRAMUSCULAR; INTRAVENOUS EVERY 24 HOURS
Refills: 0 | Status: COMPLETED | OUTPATIENT
Start: 2024-03-27 | End: 2024-03-30

## 2024-03-27 RX ORDER — LEVETIRACETAM 250 MG/1
500 TABLET, FILM COATED ORAL
Refills: 0 | Status: DISCONTINUED | OUTPATIENT
Start: 2024-03-27 | End: 2024-03-30

## 2024-03-27 RX ORDER — ACETAMINOPHEN 500 MG
1000 TABLET ORAL ONCE
Refills: 0 | Status: COMPLETED | OUTPATIENT
Start: 2024-03-27 | End: 2024-03-27

## 2024-03-27 RX ORDER — LOSARTAN POTASSIUM 100 MG/1
1 TABLET, FILM COATED ORAL
Refills: 0 | DISCHARGE

## 2024-03-27 RX ORDER — TAMSULOSIN HYDROCHLORIDE 0.4 MG/1
1 CAPSULE ORAL
Refills: 0 | DISCHARGE

## 2024-03-27 RX ORDER — ATORVASTATIN CALCIUM 80 MG/1
1 TABLET, FILM COATED ORAL
Qty: 0 | Refills: 0 | DISCHARGE

## 2024-03-27 RX ORDER — METOPROLOL TARTRATE 50 MG
1 TABLET ORAL
Qty: 0 | Refills: 0 | DISCHARGE

## 2024-03-27 RX ORDER — ONDANSETRON 8 MG/1
4 TABLET, FILM COATED ORAL EVERY 6 HOURS
Refills: 0 | Status: DISCONTINUED | OUTPATIENT
Start: 2024-03-27 | End: 2024-03-30

## 2024-03-27 RX ORDER — SODIUM CHLORIDE 9 MG/ML
1000 INJECTION INTRAMUSCULAR; INTRAVENOUS; SUBCUTANEOUS
Refills: 0 | Status: COMPLETED | OUTPATIENT
Start: 2024-03-27 | End: 2024-03-27

## 2024-03-27 RX ORDER — LOSARTAN POTASSIUM 100 MG/1
25 TABLET, FILM COATED ORAL DAILY
Refills: 0 | Status: DISCONTINUED | OUTPATIENT
Start: 2024-03-27 | End: 2024-03-30

## 2024-03-27 RX ORDER — AMIODARONE HYDROCHLORIDE 400 MG/1
1 TABLET ORAL
Qty: 0 | Refills: 0 | DISCHARGE

## 2024-03-27 RX ORDER — ACETAMINOPHEN 500 MG
650 TABLET ORAL EVERY 6 HOURS
Refills: 0 | Status: DISCONTINUED | OUTPATIENT
Start: 2024-03-27 | End: 2024-03-30

## 2024-03-27 RX ORDER — APIXABAN 2.5 MG/1
1 TABLET, FILM COATED ORAL
Qty: 0 | Refills: 0 | DISCHARGE

## 2024-03-27 RX ORDER — CEFTRIAXONE 500 MG/1
1000 INJECTION, POWDER, FOR SOLUTION INTRAMUSCULAR; INTRAVENOUS ONCE
Refills: 0 | Status: COMPLETED | OUTPATIENT
Start: 2024-03-27 | End: 2024-03-27

## 2024-03-27 RX ORDER — APIXABAN 2.5 MG/1
5 TABLET, FILM COATED ORAL
Refills: 0 | Status: DISCONTINUED | OUTPATIENT
Start: 2024-03-27 | End: 2024-03-30

## 2024-03-27 RX ORDER — AMIODARONE HYDROCHLORIDE 400 MG/1
100 TABLET ORAL DAILY
Refills: 0 | Status: DISCONTINUED | OUTPATIENT
Start: 2024-03-27 | End: 2024-03-30

## 2024-03-27 RX ORDER — FOLIC ACID 0.8 MG
1 TABLET ORAL
Qty: 0 | Refills: 0 | DISCHARGE

## 2024-03-27 RX ORDER — LANOLIN ALCOHOL/MO/W.PET/CERES
3 CREAM (GRAM) TOPICAL AT BEDTIME
Refills: 0 | Status: DISCONTINUED | OUTPATIENT
Start: 2024-03-27 | End: 2024-03-30

## 2024-03-27 RX ORDER — METOPROLOL TARTRATE 50 MG
50 TABLET ORAL
Refills: 0 | Status: DISCONTINUED | OUTPATIENT
Start: 2024-03-27 | End: 2024-03-30

## 2024-03-27 RX ORDER — METHOTREXATE 2.5 MG/1
8 TABLET ORAL
Qty: 0 | Refills: 0 | DISCHARGE

## 2024-03-27 RX ORDER — SODIUM CHLORIDE 9 MG/ML
2200 INJECTION INTRAMUSCULAR; INTRAVENOUS; SUBCUTANEOUS ONCE
Refills: 0 | Status: COMPLETED | OUTPATIENT
Start: 2024-03-27 | End: 2024-03-27

## 2024-03-27 RX ORDER — ATORVASTATIN CALCIUM 80 MG/1
80 TABLET, FILM COATED ORAL AT BEDTIME
Refills: 0 | Status: DISCONTINUED | OUTPATIENT
Start: 2024-03-27 | End: 2024-03-30

## 2024-03-27 RX ADMIN — SODIUM CHLORIDE 2200 MILLILITER(S): 9 INJECTION INTRAMUSCULAR; INTRAVENOUS; SUBCUTANEOUS at 05:22

## 2024-03-27 RX ADMIN — Medication 3 MILLIGRAM(S): at 22:52

## 2024-03-27 RX ADMIN — SODIUM CHLORIDE 75 MILLILITER(S): 9 INJECTION INTRAMUSCULAR; INTRAVENOUS; SUBCUTANEOUS at 18:46

## 2024-03-27 RX ADMIN — LOSARTAN POTASSIUM 25 MILLIGRAM(S): 100 TABLET, FILM COATED ORAL at 09:06

## 2024-03-27 RX ADMIN — ATORVASTATIN CALCIUM 80 MILLIGRAM(S): 80 TABLET, FILM COATED ORAL at 22:52

## 2024-03-27 RX ADMIN — Medication 1 MILLIGRAM(S): at 09:06

## 2024-03-27 RX ADMIN — AMIODARONE HYDROCHLORIDE 100 MILLIGRAM(S): 400 TABLET ORAL at 11:08

## 2024-03-27 RX ADMIN — TAMSULOSIN HYDROCHLORIDE 0.4 MILLIGRAM(S): 0.4 CAPSULE ORAL at 22:52

## 2024-03-27 RX ADMIN — APIXABAN 5 MILLIGRAM(S): 2.5 TABLET, FILM COATED ORAL at 22:51

## 2024-03-27 RX ADMIN — CEFTRIAXONE 1000 MILLIGRAM(S): 500 INJECTION, POWDER, FOR SOLUTION INTRAMUSCULAR; INTRAVENOUS at 06:46

## 2024-03-27 RX ADMIN — LEVETIRACETAM 500 MILLIGRAM(S): 250 TABLET, FILM COATED ORAL at 09:06

## 2024-03-27 RX ADMIN — Medication 400 MILLIGRAM(S): at 06:46

## 2024-03-27 RX ADMIN — Medication 50 MILLIGRAM(S): at 09:05

## 2024-03-27 RX ADMIN — SODIUM CHLORIDE 75 MILLILITER(S): 9 INJECTION INTRAMUSCULAR; INTRAVENOUS; SUBCUTANEOUS at 09:11

## 2024-03-27 RX ADMIN — LEVETIRACETAM 500 MILLIGRAM(S): 250 TABLET, FILM COATED ORAL at 22:52

## 2024-03-27 RX ADMIN — Medication 50 MILLIGRAM(S): at 22:51

## 2024-03-27 RX ADMIN — APIXABAN 5 MILLIGRAM(S): 2.5 TABLET, FILM COATED ORAL at 09:05

## 2024-03-27 NOTE — H&P ADULT - HISTORY OF PRESENT ILLNESS
78 y/o M with PMH of CVA with residual Left sided weakness, seizure disorder, psoriatic arthritis, Afib on Eliquis, HTN, cataracts presents with weakness and AMS. The pt is A+Ox2 at the time of my evaluation and unsure of why he came to the ER. I left a voicemail for his wife for further information. As per ER documentation, the pt was trying to get out of bed and was disoriented but he was too weak to stand up.     ER course: Temp 100.4F, HR , //78. Labs: WBC 16.42, Hb 12.2 (baseline ~13), .1, neutrophils 79%, INR 2.11, glucose 117. UA: positive nitrites, moderate leukcoyte esterase, moderate blood, many bacteria. COVID, influenza A/B, RSV negative. EKG: A fib with  bpm, no ST changes, normal intervals (personally reviewed). CXR: no consolidation, no effusion, no pneumothorax (personally reviewed).     Pt was given Ceftriaxone, IVF, Tylenol in the ER. He is being admitted to med/surg for further management.  78 y/o M with PMH of CVA with residual Left sided weakness, seizure disorder, psoriatic arthritis, Afib on Eliquis, HTN, cataracts presents with weakness and AMS. The pt is A+Ox2 (person and place) at the time of my evaluation and unsure of why he came to the ER. I left a voicemail for his wife for further information. As per ER documentation, the pt was trying to get out of bed and was disoriented but he was too weak to stand up. Upon speaking to the pt, he states that he has right ankle swelling, pain, chills, and weakness. He also reports increased urination and occasional burning on urination. Denies fevers, chest pain, URI symptoms, shortness of breath, cough, abdominal pain, N/V, diarrhea/constipation.     ER course: Temp 100.4F, HR , //78. Labs: WBC 16.42, Hb 12.2 (baseline ~13), .1, neutrophils 79%, INR 2.11, glucose 117. UA: positive nitrites, moderate leukocyte esterase, moderate blood, many bacteria. COVID, influenza A/B, RSV negative. EKG: A fib with  bpm, no ST changes, normal intervals (personally reviewed). CXR: no consolidation, no effusion, no pneumothorax (personally reviewed).     Pt was given Ceftriaxone, IVF, Tylenol in the ER. He is being admitted to med/surg for further management.

## 2024-03-27 NOTE — H&P ADULT - NSHPPHYSICALEXAM_GEN_ALL_CORE
ICU Vital Signs Last 24 Hrs  T(C): 38 (27 Mar 2024 05:15), Max: 38 (27 Mar 2024 05:15)  T(F): 100.4 (27 Mar 2024 05:15), Max: 100.4 (27 Mar 2024 05:15)  HR: 78 (27 Mar 2024 06:56) (78 - 113)  BP: 157/74 (27 Mar 2024 06:56) (151/73 - 162/78)  BP(mean): 97 (27 Mar 2024 06:56) (94 - 102)  RR: 20 (27 Mar 2024 06:56) (16 - 20)  SpO2: 96% (27 Mar 2024 06:56) (95% - 99%)    O2 Parameters below as of 27 Mar 2024 06:56  Patient On (Oxygen Delivery Method): room air    General: Awake and alert, cooperative with exam. No acute distress.   Skin: Warm, dry, and pink.   Eyes: Pupils equal and reactive to light. Extraocular eye movements intact. No conjunctival injection, discharge, or scleral icterus.   HEENT: Atraumatic, normocephalic. Moist mucus membranes.  Cardiology: Normal S1, S2. No murmurs, rubs, or gallops. Regular rate and rhythm.   Respiratory: Lungs clear to ascultation bilaterally. Good air exchange. No wheezes, rales, or rhonchi. Normal chest expansion.   Gastrointestinal: Positive bowel sounds. Soft, non-tender, non-distended. No guarding, rigidity, or rebound tenderness. No hepatosplenomegaly.   Musculoskeletal: 5/5 motor strength in all extremities. Normal range of motion.   Extremities: No peripheral edema bilaterally. Dorsalis pedis pulses 2+ bilaterally.   Neurological: A+Ox2 (person, place). Cranial nerves 2-12 intact. Normal speech. No facial droop. No focal neurological deficits.   Psychiatric: Normal affect. Normal mood. ICU Vital Signs Last 24 Hrs  T(C): 38 (27 Mar 2024 05:15), Max: 38 (27 Mar 2024 05:15)  T(F): 100.4 (27 Mar 2024 05:15), Max: 100.4 (27 Mar 2024 05:15)  HR: 78 (27 Mar 2024 06:56) (78 - 113)  BP: 157/74 (27 Mar 2024 06:56) (151/73 - 162/78)  BP(mean): 97 (27 Mar 2024 06:56) (94 - 102)  RR: 20 (27 Mar 2024 06:56) (16 - 20)  SpO2: 96% (27 Mar 2024 06:56) (95% - 99%)    O2 Parameters below as of 27 Mar 2024 06:56  Patient On (Oxygen Delivery Method): room air    General: Awake and alert, cooperative with exam. No acute distress.   Skin: Warm, dry, and pink.   Eyes: Pupils equal and reactive to light. Extraocular eye movements intact. No conjunctival injection, discharge, or scleral icterus.   HEENT: Atraumatic, normocephalic. Moist mucus membranes.  Cardiology: Normal S1, S2. No murmurs, rubs, or gallops. Irregularly irregular.   Respiratory: Lungs clear to ascultation bilaterally. Good air exchange. No wheezes, rales, or rhonchi. Normal chest expansion.   Gastrointestinal: Positive bowel sounds. Soft, non-tender, non-distended. No guarding, rigidity, or rebound tenderness. No hepatosplenomegaly.   Musculoskeletal: 5/5 motor strength in all extremities. Normal range of motion. Right ankle + swelling, no erythema/warmth/tenderness on palpation.   Extremities: No peripheral edema bilaterally. Dorsalis pedis pulses 2+ bilaterally.   Neurological: A+Ox2 (person, place). Cranial nerves 2-12 intact. Normal speech. No facial droop. No focal neurological deficits.   Psychiatric: Normal affect. Normal mood.

## 2024-03-27 NOTE — DIETITIAN INITIAL EVALUATION ADULT - ADD RECOMMEND
1. Recommend Liberalize diet to regular to maximize caloric and nutrient intake.   2. Recommend Premier protein shake daily to optimize nutritional needs (provides 160 kcal, 30 g protein/ shake)   3. Consider adding thiamine 100 mg daily 2/2 poor PO intake/ malnutrition   4. MVI w/ minerals daily to ensure 100% RDA met   5. Maintain aspiration precautions, back of bed >35 degrees.   6. Encourage protein-rich foods, maximize food preferences   7. Monitor bowel movements, c/w bowel regimen (Mg hydroxide PRN)  8. Obtain vitamin D 25OH level to assess nutriture   9. Please obtain weekly weights   10. Confirm goals of care regarding nutrition support   RD will continue to monitor PO intake, labs, hydration, and wt prn.

## 2024-03-27 NOTE — H&P ADULT - ASSESSMENT
78 y/o M presents with weakness and AMS     1. Sepsis secondary to UTI (r/o PNA, bacteremia)   - Admit to med/surg   - Temp 100.4F, HR , WBC 16.42, trend   - UA: positive nitrites, moderate leukocyte esterase, moderate blood, many bacteria   - s/p Ceftriaxone in ER; will continue   - f/u UCx, BCx x 2; adjust abx based on sensitivities  - f/u CXR official read   - Trend WBC, monitor for temperatures   - Tylenol for temperatures PRN   - s/p 30 cc/kg of IVF in the ER, c/w 75 ml/hr     2. Acute metabolic encephalopathy likely infectious etiology (UTI) vs. metabolic   - f/u CT head, B12, folate, TSH   - PT evaluation   - c/w management for UTI     3. Microcytic anemia   - Hb 12.2 (baseline ~13), .1, f/u B12 and folate     4. Hypertension   - //78, monitor   - c/w home medications     5. History of CVA with residual Left sided weakness, seizure disorder, psoriatic arthritis, Afib on Eliquis, HTN, cataracts  - c/w home medications; verified with pt at the bedside   - Glucose 117, monitor     DVT ppx: Eliquis   Code status: Please address with pt's wife. Awaiting call back.   Emergency contact: Tyra Lindsay (wife)     I spent a total of 80 minutes on the date of this encounter coordinating the patient's care. This includes reviewing prior documentation, results and imaging in addition to completing a full history and physical examination on the patient. Further tests, medications, and procedures have been ordered as indicated. Laboratory results and the plan of care were communicated to the patient and/or their family member. Supporting documentation was completed and added to the patient's chart.        78 y/o M presents with weakness and AMS     1. Sepsis secondary to UTI (r/o PNA, bacteremia)   - Admit to med/surg   - Temp 100.4F, HR , WBC 16.42, trend   - UA: positive nitrites, moderate leukocyte esterase, moderate blood, many bacteria   - s/p Ceftriaxone in ER; will continue   - f/u UCx, BCx x 2; adjust abx based on sensitivities  - f/u CXR official read   - Trend WBC, monitor for temperatures   - Tylenol for temperatures PRN   - s/p 30 cc/kg of IVF in the ER, c/w 75 ml/hr     2. Acute metabolic encephalopathy likely infectious etiology (UTI) vs. metabolic   - f/u CT head, B12, folate, TSH   - PT evaluation   - c/w management for UTI     3. Microcytic anemia   - Hb 12.2 (baseline ~13), .1, f/u B12 and folate     4. Hypertension   - //78, monitor   - c/w home medications     5. History of CVA with residual Left sided weakness, seizure disorder, psoriatic arthritis, Afib on Eliquis, HTN, cataracts  - c/w home medications; verified with pt at the bedside   - Glucose 117, monitor     DVT ppx: Eliquis   Code status: Please address with pt's wife. Awaiting call back.   Emergency contact: Tyra Lindsay (wife) [phone]     I spent a total of 80 minutes on the date of this encounter coordinating the patient's care. This includes reviewing prior documentation, results and imaging in addition to completing a full history and physical examination on the patient. Further tests, medications, and procedures have been ordered as indicated. Laboratory results and the plan of care were communicated to the patient and/or their family member. Supporting documentation was completed and added to the patient's chart.

## 2024-03-27 NOTE — ED PROVIDER NOTE - CLINICAL SUMMARY MEDICAL DECISION MAKING FREE TEXT BOX
79-year-old male with history of paroxysmal A-fib, CVA, seizures, prior admissions for sepsis and UTI presents for evaluation of weakness and altered mental status according to his wife.  She notes that the patient was attempting to get out of bed and seemed to be disoriented but was too weak to get up and stand.  Patient found to be tachycardic on arrival and a septic workup was initiated.  The patient is unable to provide any further HPI or review of systems due to his current mental status.    Sepsis - undifferentiated.  (Other DDx includes: PE, adrenal insufficiency, DKA, pancreatitis, anaphylaxis, SBO/LBO, hypovolemia/severe anemia, colitis, vasculitis, tox or w/d depending on clinical context & hx)  Plan: 1) D-stick/rectal temp/IV Access/IVF/Labs/UA/BCx & UCx.  2) CXR.  3) IV Abx.  4) Frequent reassessment - if no improvement, consider expanding ABx, pressors, echo, CT, LP or nonifectious etiology (above).  5) ICU if poor responce to therapy or persistently unstable VS.  6) Admit

## 2024-03-27 NOTE — ED ADULT TRIAGE NOTE - CHIEF COMPLAINT QUOTE
pt bibems from home. Pt presents with septic vs. HR 113bpm, oral temp 99.2. Pt A&ox2-3. HX of dementia. As per ems, " family called because hes more weak then usual".

## 2024-03-27 NOTE — ED ADULT NURSE NOTE - OBJECTIVE STATEMENT
The pt is a 78 y/o male alert to self ambulatory at baseline coming from home presenting to the ED c/o weakness and AMS as per wife. Pt wife reports that he was trying to get out of bed and seemed to be disoriented but was too weak to stand up. Pt poor historian due to acuity of illness. Respirations even and unlabored, no distress noted.

## 2024-03-27 NOTE — PHYSICAL THERAPY INITIAL EVALUATION ADULT - GENERAL OBSERVATIONS, REHAB EVAL
Pt found supine in bed on 5S in NAD, lethargic, but agreeable to participate in bed side PT Evaluation. Pt is able to participate in AROM/ MMT, however is not willing to sit at the edge of bed secondary to fatigue. Pt provided with education and encouragement regarding benefits of OOB, however pt continues to refuse to attempt sitting at EOB. OOB Assessment to be performed at later date/ time. Pt returned to bed with call bell and phone in reach, bed alarm on, RN Ike Ramires is aware.

## 2024-03-27 NOTE — ED ADULT NURSE NOTE - NSFALLASSISTNEEDED_ED_ALL_ED
Patient: Carol Guillaume    Procedure: Procedure(s):  DILATION AND CURETTAGE  EXCHANGE OF MIRENA INTRAUTERINE DEVICE       Diagnosis: Endometrial hyperplasia [N85.00]  Diagnosis Additional Information: No value filed.    Anesthesia Type:   General      Note:    Oropharynx: oropharynx clear of all foreign objects and spontaneously breathing  Level of Consciousness: awake  Oxygen Supplementation: face mask  Level of Supplemental Oxygen (L/min / FiO2): 6  Independent Airway: airway patency satisfactory and stable  Dentition: dentition unchanged  Vital Signs Stable: post-procedure vital signs reviewed and stable  Report to RN Given: handoff report given  Patient transferred to: PACU (same day)  Comments: VSS and spont breathing. Pt alert and stating no pain.   Handoff Report: Identifed the Patient, Identified the Reponsible Provider, Reviewed the pertinent medical history, Discussed the surgical course, Reviewed Intra-OP anesthesia mangement and issues during anesthesia, Set expectations for post-procedure period and Allowed opportunity for questions and acknowledgement of understanding      Vitals:  Vitals Value Taken Time   /91 05/09/22 0839   Temp 36.3 C    Pulse 84 05/09/22 0841   Resp 12 05/09/22 0841   SpO2 98 % 05/09/22 0841   Vitals shown include unvalidated device data.    Electronically Signed By: MATTHEW Ramires CRNA  May 9, 2022  8:42 AM   Standing/Walking/Toileting/Moving from bed to stretcher

## 2024-03-27 NOTE — H&P ADULT - NSHPSOCIALHISTORY_GEN_ALL_CORE
Lives with his wife Tyra. Lives with his wife Tyra. Needs assistance with ADLs and IADLs. Denies smoking, alcohol, drug use.

## 2024-03-27 NOTE — DIETITIAN INITIAL EVALUATION ADULT - ORAL INTAKE PTA/DIET HISTORY
Pt w/ AMS - obtained majority of hx from wife at bedside. Pt lives at home w/ wife who cooks and grocery shops. Reports pt consumes 3 meals/day - likely meeting ~75% ENN. Reports pt has chronic pain from arthritis. 
0 = understands/communicates without difficulty

## 2024-03-27 NOTE — DIETITIAN INITIAL EVALUATION ADULT - PERTINENT LABORATORY DATA
03-27    138  |  106  |  24<H>  ----------------------------<  117<H>  4.6   |  27  |  1.02    Ca    8.9      27 Mar 2024 04:06    TPro  6.8  /  Alb  3.6  /  TBili  1.2  /  DBili  x   /  AST  20  /  ALT  23  /  AlkPhos  96  03-27  POCT Blood Glucose.: 97 mg/dL (03-27-24 @ 11:54)

## 2024-03-27 NOTE — PATIENT PROFILE ADULT - FALL HARM RISK - HARM RISK INTERVENTIONS
Assistance with ambulation/Assistance OOB with selected safe patient handling equipment/Communicate Risk of Fall with Harm to all staff/Discuss with provider need for PT consult/Monitor for mental status changes/Monitor gait and stability/Move patient closer to nurses' station/Provide patient with walking aids - walker, cane, crutches/Reinforce activity limits and safety measures with patient and family/Reorient to person, place and time as needed/Tailored Fall Risk Interventions/Toileting schedule using arm’s reach rule for commode and bathroom/Use of alarms - bed, chair and/or voice tab/Visual Cue: Yellow wristband and red socks/Bed in lowest position, wheels locked, appropriate side rails in place/Call bell, personal items and telephone in reach/Instruct patient to call for assistance before getting out of bed or chair/Non-slip footwear when patient is out of bed/[place] to call system/Physically safe environment - no spills, clutter or unnecessary equipment/Purposeful Proactive Rounding/Room/bathroom lighting operational, light cord in reach

## 2024-03-27 NOTE — DIETITIAN INITIAL EVALUATION ADULT - PERTINENT MEDS FT
MEDICATIONS  (STANDING):  aMIOdarone    Tablet 100 milliGRAM(s) Oral daily  apixaban 5 milliGRAM(s) Oral two times a day  atorvastatin 80 milliGRAM(s) Oral at bedtime  cefTRIAXone Injectable. 1000 milliGRAM(s) IV Push every 24 hours  folic acid 1 milliGRAM(s) Oral daily  levETIRAcetam 500 milliGRAM(s) Oral two times a day  losartan 25 milliGRAM(s) Oral daily  methotrexate 20 milliGRAM(s) Oral <User Schedule>  metoprolol tartrate 50 milliGRAM(s) Oral two times a day  sodium chloride 0.9%. 1000 milliLiter(s) (75 mL/Hr) IV Continuous <Continuous>  tamsulosin 0.4 milliGRAM(s) Oral at bedtime    MEDICATIONS  (PRN):  acetaminophen     Tablet .. 650 milliGRAM(s) Oral every 6 hours PRN Temp greater or equal to 38C (100.4F), Mild Pain (1 - 3)  aluminum hydroxide/magnesium hydroxide/simethicone Suspension 30 milliLiter(s) Oral every 4 hours PRN Dyspepsia  melatonin 3 milliGRAM(s) Oral at bedtime PRN Insomnia  ondansetron Injectable 4 milliGRAM(s) IV Push every 6 hours PRN Nausea and/or Vomiting  oxyCODONE    IR 10 milliGRAM(s) Oral every 8 hours PRN for mod to severe pain

## 2024-03-27 NOTE — PHYSICAL THERAPY INITIAL EVALUATION ADULT - PERTINENT HX OF CURRENT PROBLEM, REHAB EVAL
78 y/o M with PMH of CVA with residual Left sided weakness, seizure disorder, psoriatic arthritis, Afib on Eliquis, HTN, cataracts presents with weakness and AMS. The pt is A+Ox2 (person and place) at the time of my evaluation and unsure of why he came to the ER. I left a voicemail for his wife for further information. As per ER documentation, the pt was trying to get out of bed and was disoriented but he was too weak to stand up. Upon speaking to the pt, he states that he has right ankle swelling, pain, chills, and weakness. He also reports increased urination and occasional burning on urination. Denies fevers, chest pain, URI symptoms, shortness of breath, cough, abdominal pain, N/V, diarrhea/constipation.

## 2024-03-27 NOTE — ED PROVIDER NOTE - PHYSICAL EXAMINATION
CONSTITUTIONAL: Well appearing, awake, alert,   Oriented to self only  · ENMT: Airway patent, Nasal mucosa clear. Mouth with dry mucosa. Throat has no vesicles, no oropharyngeal exudates and uvula is midline.  · EYES: Clear bilaterally, pupils equal, round and reactive to light.  · CARDIAC: tachycardia,, regular rhythm.  Heart sounds S1, S2.  No murmurs, rubs or gallops.  · RESPIRATORY: Breath sounds clear and equal bilaterally.  · GASTROINTESTINAL: Abdomen soft, non-tender, no guarding.  · MUSCULOSKELETAL: Spine appears normal, range of motion is not limited, no muscle or joint tenderness  · NEUROLOGICAL: Alert  but disoriented.  Moving all 4 extremities spontaneously  · SKIN: Skin normal color for race, warm, dry and intact. No evidence of rash

## 2024-03-27 NOTE — DIETITIAN INITIAL EVALUATION ADULT - NSFNSGIIOFT_GEN_A_CORE
I&O's Detail    27 Mar 2024 07:01  -  27 Mar 2024 14:58  --------------------------------------------------------  IN:    Oral Fluid: 220 mL  Total IN: 220 mL    OUT:    Voided (mL): 200 mL  Total OUT: 200 mL    Total NET: 20 mL

## 2024-03-27 NOTE — ED PROVIDER NOTE - OBJECTIVE STATEMENT
79-year-old male with history of paroxysmal A-fib, CVA, seizures, prior admissions for sepsis and UTI presents for evaluation of weakness and altered mental status according to his wife.  She notes that the patient was attempting to get out of bed and seemed to be disoriented but was too weak to get up and stand.  Patient found to be tachycardic on arrival and a septic workup was initiated.  The patient is unable to provide any further HPI or review of systems due to his current mental status.

## 2024-03-27 NOTE — DIETITIAN INITIAL EVALUATION ADULT - OTHER INFO
80 y/o M with PMH of CVA with residual Left sided weakness, seizure disorder, psoriatic arthritis, Afib on Eliquis, HTN, cataracts presents with weakness and AMS. The pt is A+Ox2 (person and place) at the time of evaluation and unsure of why he came to the ER. As per ER documentation, the pt was trying to get out of bed and was disoriented but he was too weak to stand up. Upon speaking to the pt, he states that he has right ankle swelling, pain, chills, and weakness. He also reports increased urination and occasional burning on urination. Adm w/ sepsis and metabolic encephalopathy 2/2 UTI.     Pt w/ AMS - obtained diet/wt hx from wife at bedside. Pt reports fair appetite at present. Wife endorses UBW of 190-200# from unknown time frame. RD obtained bed scale wt of 189# 3/27. NFPE reveals mild muscle/fat wasting. Pt willing to trial ONS - Recommend Premier protein shake daily to optimize nutritional needs (provides 160 kcal, 30 g protein/ shake). Recommend Liberalize diet to regular to maximize caloric and nutrient intake. See below for further recommendations.

## 2024-03-27 NOTE — ED ADULT NURSE NOTE - NSFALLHARMRISKINTERV_ED_ALL_ED

## 2024-03-27 NOTE — ED ADULT NURSE NOTE - CHIEF COMPLAINT QUOTE
January 8, 2023      Urgent Care - [place] [address], [address]  [facility] [address]  Phone: [phone]  Fax: [phone]       Patient: Carmelita Escobedo   YOB: 2008  Date of Visit: 01/08/2023    To Whom It May Concern:    Jonathan Ecsobedo  was at [facility] on 01/08/2023. The patient may return to work/school on 1/9/2023 with restrictions (accommodations for 1 week). If you have any questions or concerns, or if I can be of further assistance, please do not hesitate to contact me.    Sincerely,      Almas Alston, NP      pt bibems from home. Pt presents with septic vs. HR 113bpm, oral temp 99.2. Pt A&ox2-3. HX of dementia. As per ems, " family called because hes more weak then usual".

## 2024-03-28 LAB
ANION GAP SERPL CALC-SCNC: 4 MMOL/L — LOW (ref 5–17)
BASOPHILS # BLD AUTO: 0.03 K/UL — SIGNIFICANT CHANGE UP (ref 0–0.2)
BASOPHILS NFR BLD AUTO: 0.2 % — SIGNIFICANT CHANGE UP (ref 0–2)
BUN SERPL-MCNC: 15 MG/DL — SIGNIFICANT CHANGE UP (ref 7–23)
CALCIUM SERPL-MCNC: 8.5 MG/DL — SIGNIFICANT CHANGE UP (ref 8.5–10.1)
CHLORIDE SERPL-SCNC: 113 MMOL/L — HIGH (ref 96–108)
CO2 SERPL-SCNC: 26 MMOL/L — SIGNIFICANT CHANGE UP (ref 22–31)
CREAT SERPL-MCNC: 0.79 MG/DL — SIGNIFICANT CHANGE UP (ref 0.5–1.3)
EGFR: 89 ML/MIN/1.73M2 — SIGNIFICANT CHANGE UP
EOSINOPHIL # BLD AUTO: 0.03 K/UL — SIGNIFICANT CHANGE UP (ref 0–0.5)
EOSINOPHIL NFR BLD AUTO: 0.2 % — SIGNIFICANT CHANGE UP (ref 0–6)
FOLATE SERPL-MCNC: 14.2 NG/ML — SIGNIFICANT CHANGE UP
GLUCOSE SERPL-MCNC: 101 MG/DL — HIGH (ref 70–99)
HCT VFR BLD CALC: 33.8 % — LOW (ref 39–50)
HGB BLD-MCNC: 11 G/DL — LOW (ref 13–17)
IMM GRANULOCYTES NFR BLD AUTO: 0.7 % — SIGNIFICANT CHANGE UP (ref 0–0.9)
LYMPHOCYTES # BLD AUTO: 0.94 K/UL — LOW (ref 1–3.3)
LYMPHOCYTES # BLD AUTO: 5.8 % — LOW (ref 13–44)
MCHC RBC-ENTMCNC: 32.5 GM/DL — SIGNIFICANT CHANGE UP (ref 32–36)
MCHC RBC-ENTMCNC: 32.6 PG — SIGNIFICANT CHANGE UP (ref 27–34)
MCV RBC AUTO: 100.3 FL — HIGH (ref 80–100)
MONOCYTES # BLD AUTO: 1.02 K/UL — HIGH (ref 0–0.9)
MONOCYTES NFR BLD AUTO: 6.3 % — SIGNIFICANT CHANGE UP (ref 2–14)
NEUTROPHILS # BLD AUTO: 14.1 K/UL — HIGH (ref 1.8–7.4)
NEUTROPHILS NFR BLD AUTO: 86.8 % — HIGH (ref 43–77)
PLATELET # BLD AUTO: 117 K/UL — LOW (ref 150–400)
POTASSIUM SERPL-MCNC: 4 MMOL/L — SIGNIFICANT CHANGE UP (ref 3.5–5.3)
POTASSIUM SERPL-SCNC: 4 MMOL/L — SIGNIFICANT CHANGE UP (ref 3.5–5.3)
RBC # BLD: 3.37 M/UL — LOW (ref 4.2–5.8)
RBC # FLD: 14.3 % — SIGNIFICANT CHANGE UP (ref 10.3–14.5)
SODIUM SERPL-SCNC: 143 MMOL/L — SIGNIFICANT CHANGE UP (ref 135–145)
VIT B12 SERPL-MCNC: 178 PG/ML — LOW (ref 232–1245)
WBC # BLD: 16.24 K/UL — HIGH (ref 3.8–10.5)
WBC # FLD AUTO: 16.24 K/UL — HIGH (ref 3.8–10.5)

## 2024-03-28 PROCEDURE — 99497 ADVNCD CARE PLAN 30 MIN: CPT

## 2024-03-28 PROCEDURE — 99233 SBSQ HOSP IP/OBS HIGH 50: CPT

## 2024-03-28 RX ORDER — PREGABALIN 225 MG/1
1000 CAPSULE ORAL DAILY
Refills: 0 | Status: DISCONTINUED | OUTPATIENT
Start: 2024-03-28 | End: 2024-03-30

## 2024-03-28 RX ORDER — POLYETHYLENE GLYCOL 3350 17 G/17G
17 POWDER, FOR SOLUTION ORAL DAILY
Refills: 0 | Status: DISCONTINUED | OUTPATIENT
Start: 2024-03-28 | End: 2024-03-30

## 2024-03-28 RX ADMIN — TAMSULOSIN HYDROCHLORIDE 0.4 MILLIGRAM(S): 0.4 CAPSULE ORAL at 21:18

## 2024-03-28 RX ADMIN — Medication 650 MILLIGRAM(S): at 22:15

## 2024-03-28 RX ADMIN — Medication 650 MILLIGRAM(S): at 21:22

## 2024-03-28 RX ADMIN — LEVETIRACETAM 500 MILLIGRAM(S): 250 TABLET, FILM COATED ORAL at 10:42

## 2024-03-28 RX ADMIN — SODIUM CHLORIDE 75 MILLILITER(S): 9 INJECTION INTRAMUSCULAR; INTRAVENOUS; SUBCUTANEOUS at 06:20

## 2024-03-28 RX ADMIN — Medication 650 MILLIGRAM(S): at 03:10

## 2024-03-28 RX ADMIN — Medication 650 MILLIGRAM(S): at 10:48

## 2024-03-28 RX ADMIN — LEVETIRACETAM 500 MILLIGRAM(S): 250 TABLET, FILM COATED ORAL at 21:19

## 2024-03-28 RX ADMIN — APIXABAN 5 MILLIGRAM(S): 2.5 TABLET, FILM COATED ORAL at 21:19

## 2024-03-28 RX ADMIN — Medication 50 MILLIGRAM(S): at 21:19

## 2024-03-28 RX ADMIN — LOSARTAN POTASSIUM 25 MILLIGRAM(S): 100 TABLET, FILM COATED ORAL at 10:42

## 2024-03-28 RX ADMIN — CEFTRIAXONE 1000 MILLIGRAM(S): 500 INJECTION, POWDER, FOR SOLUTION INTRAMUSCULAR; INTRAVENOUS at 06:12

## 2024-03-28 RX ADMIN — Medication 650 MILLIGRAM(S): at 06:15

## 2024-03-28 RX ADMIN — APIXABAN 5 MILLIGRAM(S): 2.5 TABLET, FILM COATED ORAL at 10:42

## 2024-03-28 RX ADMIN — POLYETHYLENE GLYCOL 3350 17 GRAM(S): 17 POWDER, FOR SOLUTION ORAL at 17:54

## 2024-03-28 RX ADMIN — ATORVASTATIN CALCIUM 80 MILLIGRAM(S): 80 TABLET, FILM COATED ORAL at 21:18

## 2024-03-28 RX ADMIN — Medication 1 MILLIGRAM(S): at 10:42

## 2024-03-28 RX ADMIN — AMIODARONE HYDROCHLORIDE 100 MILLIGRAM(S): 400 TABLET ORAL at 10:42

## 2024-03-28 RX ADMIN — PREGABALIN 1000 MICROGRAM(S): 225 CAPSULE ORAL at 10:41

## 2024-03-28 RX ADMIN — Medication 50 MILLIGRAM(S): at 10:42

## 2024-03-28 NOTE — PROGRESS NOTE ADULT - ASSESSMENT
80 y/o M presents with weakness and AMS     Sepsis POA secondary to E Coli UTI   Acute infectious encephalopathy due to above - now resolved   - Temp 100.4F, HR , WBC 16.42, trend   - UA: positive nitrites, moderate leukocyte esterase, moderate blood, many bacteria   - s/p Ceftriaxone in ER; will continue   - UCx + E Coli, f/u sensitivities   - BCx NGTD x 2   - CT head without acute pathology   - CXR as above - no consolidation   - Trend WBC, monitor for temperatures   - Tylenol for temperatures PRN   - s/p 30 cc/kg of IVF in the ER, d/c 75 ml/hr     Macrocytic anemia   - Vitamin B12: 168, supplement IM for now    Hypertension   - //78, monitor   - c/w home medications     History of CVA with residual Left sided weakness, seizure disorder, psoriatic arthritis, chronic Afib on Eliquis, HTN, cataracts  - c/w home medications    DVT ppx: On Eliquis     Emergency contact: Tyraliu Lindsay (wife) [phone]  78 y/o M presents with weakness and AMS     Sepsis POA secondary to E Coli UTI   Acute infectious encephalopathy due to above - now resolved   - Temp 100.4F, HR , WBC 16.42, trend   - UA: positive nitrites, moderate leukocyte esterase, moderate blood, many bacteria   - s/p Ceftriaxone in ER; will continue   - UCx + E Coli, f/u sensitivities   - BCx NGTD x 2   - CT head without acute pathology   - CXR as above - no consolidation   - Trend WBC, monitor for temperatures   - Tylenol for temperatures PRN   - s/p 30 cc/kg of IVF in the ER, d/c 75 ml/hr     Macrocytic anemia   - Vitamin B12: 168, supplement IM for now    Hypertension   - //78, monitor   - c/w home medications     History of CVA with residual Left sided weakness, seizure disorder, psoriatic arthritis, chronic Afib on Eliquis, HTN, cataracts  - c/w home medications    DVT ppx: On Eliquis     GOC: Full code, d/w pt and wife today, wishes to be fully resuscitated     Emergency contact: Tyra Lindsay (wife) [phone]     d/w wife at bedside today

## 2024-03-28 NOTE — PROGRESS NOTE ADULT - SUBJECTIVE AND OBJECTIVE BOX
HPI:  80 y/o M with PMH of CVA with residual Left sided weakness, seizure disorder, psoriatic arthritis, Afib on Eliquis, HTN, cataracts presents with weakness and AMS. The pt is A+Ox2 (person and place) at the time of my evaluation and unsure of why he came to the ER. I left a voicemail for his wife for further information. As per ER documentation, the pt was trying to get out of bed and was disoriented but he was too weak to stand up. Upon speaking to the pt, he states that he has right ankle swelling, pain, chills, and weakness. He also reports increased urination and occasional burning on urination. Denies fevers, chest pain, URI symptoms, shortness of breath, cough, abdominal pain, N/V, diarrhea/constipation.     ER course: Temp 100.4F, HR , //78. Labs: WBC 16.42, Hb 12.2 (baseline ~13), .1, neutrophils 79%, INR 2.11, glucose 117. UA: positive nitrites, moderate leukocyte esterase, moderate blood, many bacteria. COVID, influenza A/B, RSV negative. EKG: A fib with  bpm, no ST changes, normal intervals (personally reviewed). CXR: no consolidation, no effusion, no pneumothorax (personally reviewed).     Pt was given Ceftriaxone, IVF, Tylenol in the ER. He is being admitted to med/surg for further management.  (27 Mar 2024 07:34)      MEDICATIONS  (STANDING):  aMIOdarone    Tablet 100 milliGRAM(s) Oral daily  apixaban 5 milliGRAM(s) Oral two times a day  atorvastatin 80 milliGRAM(s) Oral at bedtime  cefTRIAXone Injectable. 1000 milliGRAM(s) IV Push every 24 hours  cyanocobalamin Injectable 1000 MICROGram(s) IntraMuscular daily  folic acid 1 milliGRAM(s) Oral daily  levETIRAcetam 500 milliGRAM(s) Oral two times a day  losartan 25 milliGRAM(s) Oral daily  methotrexate 20 milliGRAM(s) Oral <User Schedule>  metoprolol tartrate 50 milliGRAM(s) Oral two times a day  tamsulosin 0.4 milliGRAM(s) Oral at bedtime    MEDICATIONS  (PRN):  acetaminophen     Tablet .. 650 milliGRAM(s) Oral every 6 hours PRN Temp greater or equal to 38C (100.4F), Mild Pain (1 - 3)  aluminum hydroxide/magnesium hydroxide/simethicone Suspension 30 milliLiter(s) Oral every 4 hours PRN Dyspepsia  melatonin 3 milliGRAM(s) Oral at bedtime PRN Insomnia  ondansetron Injectable 4 milliGRAM(s) IV Push every 6 hours PRN Nausea and/or Vomiting  oxyCODONE    IR 10 milliGRAM(s) Oral every 8 hours PRN for mod to severe pain  polyethylene glycol 3350 17 Gram(s) Oral daily PRN Constipation      Vital Signs Last 24 Hrs  T(C): 36.8 (28 Mar 2024 08:19), Max: 38.2 (28 Mar 2024 03:09)  T(F): 98.2 (28 Mar 2024 08:19), Max: 100.7 (28 Mar 2024 03:09)  HR: 59 (28 Mar 2024 08:19) (59 - 77)  BP: 118/61 (28 Mar 2024 08:19) (118/61 - 147/64)  BP(mean): 89 (27 Mar 2024 21:28) (89 - 89)  RR: 18 (28 Mar 2024 08:19) (18 - 18)  SpO2: 93% (28 Mar 2024 08:19) (93% - 96%)    Parameters below as of 28 Mar 2024 08:19  Patient On (Oxygen Delivery Method): room air        GEN: NAD, comfortable, resting in bed  HEENT: NC/AT, EOMI, PERRLA, MMM, clear conjunctiva and sclera, normal hearing, no nasal discharge, throat clear, no thrush, normal dentition.   NECK: supple, no JVD, no LAD, no thyromegaly  BACK:  ROM intact, no spinal/paraspinal tenderness  CV: S1S2, RRR, no mumur  RESP: good air movement, CTABL, no rales, rhonchi or wheezing, respirations unlabored  ABD: +BS, soft, ND, NT, no guarding, no rigidity, no HSM  EXT: +2 radial and pedal pulses, no edema, no calve tenderness  SKIN: No visible Rashes or Ulcers  MSK: ROM intact in all extremities  NEURO:  sensory and CN 2-12 Grossly intact, no motor deficits, no, saddle anesthesia, AOx3  PSYCH: normal behavior         LABS:                          11.0   16.24 )-----------( 117      ( 28 Mar 2024 07:04 )             33.8     28 Mar 2024 07:04    143    |  113    |  15     ----------------------------<  101    4.0     |  26     |  0.79     Ca    8.5        28 Mar 2024 07:04    TPro  6.8    /  Alb  3.6    /  TBili  1.2    /  DBili  x      /  AST  20     /  ALT  23     /  AlkPhos  96     27 Mar 2024 04:06    LIVER FUNCTIONS - ( 27 Mar 2024 04:06 )  Alb: 3.6 g/dL / Pro: 6.8 gm/dL / ALK PHOS: 96 U/L / ALT: 23 U/L / AST: 20 U/L / GGT: x           PT/INR - ( 27 Mar 2024 04:06 )   PT: 23.3 sec;   INR: 2.11 ratio         PTT - ( 27 Mar 2024 04:06 )  PTT:36.1 sec  CAPILLARY BLOOD GLUCOSE            Urinalysis Basic - ( 28 Mar 2024 07:04 )    Color: x / Appearance: x / SG: x / pH: x  Gluc: 101 mg/dL / Ketone: x  / Bili: x / Urobili: x   Blood: x / Protein: x / Nitrite: x   Leuk Esterase: x / RBC: x / WBC x   Sq Epi: x / Non Sq Epi: x / Bacteria: x        RADIOLOGY:    < from: CT Head No Cont (03.27.24 @ 08:37) >  FINDINGS:    Encephalomalacia and gliosis in the right parietal and temporal lobes.   There is no acute intracranial hemorrhage or mass effect. There are areas   of hypodensity in the bilateral hemispheric white matter suggesting white   matter microvascular ischemic change. The ventricles and sulci are normal   in size for patient's age.    There is no extraaxial fluid collection.    There is no displaced calvarial fracture. Status post bilateral   intraocular lens implants. The visualized portions of the paranasal   sinuses are well aerated. The mastoid air cells are well aerated.      IMPRESSION: No acute intracranial hemorrhage or mass effect.      < from: Xray Chest 1 View-PORTABLE IMMEDIATE (03.27.24 @ 04:22) >  INTERPRETATION:  Sepsis.    AP chest. Prior dated 6/16/2022.    IMPRESSION: Cardiomegaly. Elevation right hemidiaphragm. Trace right   pleural effusion. No focal consolidation.           HPI: 80 y/o M with PMH of CVA with residual Left sided weakness, seizure disorder, psoriatic arthritis, Afib on Eliquis, HTN, cataracts presented with weakness and AMS. The pt is A+Ox2 (person and place) at the time of my evaluation and unsure of why he came to the ER. I left a voicemail for his wife for further information. As per ER documentation, the pt was trying to get out of bed and was disoriented but he was too weak to stand up. Upon speaking to the pt, he states that he has right ankle swelling, pain, chills, and weakness. He also reports increased urination and occasional burning on urination. Denies fevers, chest pain, URI symptoms, shortness of breath, cough, abdominal pain, N/V, diarrhea/constipation. ER course: Temp 100.4F, HR , //78. Labs: WBC 16.42, Hb 12.2 (baseline ~13), .1, neutrophils 79%, INR 2.11, glucose 117. UA: positive nitrites, moderate leukocyte esterase, moderate blood, many bacteria. COVID, influenza A/B, RSV negative.       Subjective: Patient seen today, feels well, c/o constipation, no overnight issues reported, UCx: + E Coli    REVIEW OF SYSTEMS:    CONSTITUTIONAL: No weakness, fevers or chills  EYES/ENT: No visual changes;  No vertigo or throat pain   NECK: No pain or stiffness  RESPIRATORY: No cough, wheezing, hemoptysis; No shortness of breath  CARDIOVASCULAR: No chest pain or palpitations  GASTROINTESTINAL: No abdominal or epigastric pain. No nausea, vomiting, or hematemesis; No diarrhea, +constipation. No melena or hematochezia.  GENITOURINARY: No dysuria, frequency or hematuria  NEUROLOGICAL: No numbness or weakness  SKIN: No itching, rashes        MEDICATIONS  (STANDING):  aMIOdarone    Tablet 100 milliGRAM(s) Oral daily  apixaban 5 milliGRAM(s) Oral two times a day  atorvastatin 80 milliGRAM(s) Oral at bedtime  cefTRIAXone Injectable. 1000 milliGRAM(s) IV Push every 24 hours  cyanocobalamin Injectable 1000 MICROGram(s) IntraMuscular daily  folic acid 1 milliGRAM(s) Oral daily  levETIRAcetam 500 milliGRAM(s) Oral two times a day  losartan 25 milliGRAM(s) Oral daily  methotrexate 20 milliGRAM(s) Oral <User Schedule>  metoprolol tartrate 50 milliGRAM(s) Oral two times a day  tamsulosin 0.4 milliGRAM(s) Oral at bedtime    MEDICATIONS  (PRN):  acetaminophen     Tablet .. 650 milliGRAM(s) Oral every 6 hours PRN Temp greater or equal to 38C (100.4F), Mild Pain (1 - 3)  aluminum hydroxide/magnesium hydroxide/simethicone Suspension 30 milliLiter(s) Oral every 4 hours PRN Dyspepsia  melatonin 3 milliGRAM(s) Oral at bedtime PRN Insomnia  ondansetron Injectable 4 milliGRAM(s) IV Push every 6 hours PRN Nausea and/or Vomiting  oxyCODONE    IR 10 milliGRAM(s) Oral every 8 hours PRN for mod to severe pain  polyethylene glycol 3350 17 Gram(s) Oral daily PRN Constipation      Vital Signs Last 24 Hrs  T(C): 36.8 (28 Mar 2024 08:19), Max: 38.2 (28 Mar 2024 03:09)  T(F): 98.2 (28 Mar 2024 08:19), Max: 100.7 (28 Mar 2024 03:09)  HR: 59 (28 Mar 2024 08:19) (59 - 77)  BP: 118/61 (28 Mar 2024 08:19) (118/61 - 147/64)  BP(mean): 89 (27 Mar 2024 21:28) (89 - 89)  RR: 18 (28 Mar 2024 08:19) (18 - 18)  SpO2: 93% (28 Mar 2024 08:19) (93% - 96%)    Parameters below as of 28 Mar 2024 08:19  Patient On (Oxygen Delivery Method): room air      PHYSICAL EXAM:  GENERAL: NAD, lying in bed comfortably  HEAD:  Atraumatic, Normocephalic  EYES: conjunctiva and sclera clear  ENT: Moist mucous membranes  NECK: Supple, No JVD  CHEST/LUNG: Clear to auscultation bilaterally; No rales, rhonchi, wheezing. Unlabored respirations  HEART: Regular rate and rhythm; No murmurs  ABDOMEN: Bowel sounds present; Soft, Nontender, Nondistended.   EXTREMITIES:  2+ Peripheral Pulses, brisk capillary refill. No clubbing, cyanosis, or edema  NERVOUS SYSTEM:  Alert & Oriented X3, speech clear. No deficits   MSK: FROM all 4 extremities, full and equal strength          LABS:                          11.0   16.24 )-----------( 117      ( 28 Mar 2024 07:04 )             33.8     28 Mar 2024 07:04    143    |  113    |  15     ----------------------------<  101    4.0     |  26     |  0.79     Ca    8.5        28 Mar 2024 07:04    TPro  6.8    /  Alb  3.6    /  TBili  1.2    /  DBili  x      /  AST  20     /  ALT  23     /  AlkPhos  96     27 Mar 2024 04:06    LIVER FUNCTIONS - ( 27 Mar 2024 04:06 )  Alb: 3.6 g/dL / Pro: 6.8 gm/dL / ALK PHOS: 96 U/L / ALT: 23 U/L / AST: 20 U/L / GGT: x           PT/INR - ( 27 Mar 2024 04:06 )   PT: 23.3 sec;   INR: 2.11 ratio         PTT - ( 27 Mar 2024 04:06 )  PTT:36.1 sec  CAPILLARY BLOOD GLUCOSE            Urinalysis Basic - ( 28 Mar 2024 07:04 )    Color: x / Appearance: x / SG: x / pH: x  Gluc: 101 mg/dL / Ketone: x  / Bili: x / Urobili: x   Blood: x / Protein: x / Nitrite: x   Leuk Esterase: x / RBC: x / WBC x   Sq Epi: x / Non Sq Epi: x / Bacteria: x        RADIOLOGY:    < from: CT Head No Cont (03.27.24 @ 08:37) >  FINDINGS:    Encephalomalacia and gliosis in the right parietal and temporal lobes.   There is no acute intracranial hemorrhage or mass effect. There are areas   of hypodensity in the bilateral hemispheric white matter suggesting white   matter microvascular ischemic change. The ventricles and sulci are normal   in size for patient's age.    There is no extraaxial fluid collection.    There is no displaced calvarial fracture. Status post bilateral   intraocular lens implants. The visualized portions of the paranasal   sinuses are well aerated. The mastoid air cells are well aerated.      IMPRESSION: No acute intracranial hemorrhage or mass effect.      < from: Xray Chest 1 View-PORTABLE IMMEDIATE (03.27.24 @ 04:22) >  INTERPRETATION:  Sepsis.    AP chest. Prior dated 6/16/2022.    IMPRESSION: Cardiomegaly. Elevation right hemidiaphragm. Trace right   pleural effusion. No focal consolidation.

## 2024-03-29 LAB
ANION GAP SERPL CALC-SCNC: 6 MMOL/L — SIGNIFICANT CHANGE UP (ref 5–17)
BASOPHILS # BLD AUTO: 0.02 K/UL — SIGNIFICANT CHANGE UP (ref 0–0.2)
BASOPHILS NFR BLD AUTO: 0.1 % — SIGNIFICANT CHANGE UP (ref 0–2)
BUN SERPL-MCNC: 14 MG/DL — SIGNIFICANT CHANGE UP (ref 7–23)
CALCIUM SERPL-MCNC: 8.6 MG/DL — SIGNIFICANT CHANGE UP (ref 8.5–10.1)
CHLORIDE SERPL-SCNC: 114 MMOL/L — HIGH (ref 96–108)
CO2 SERPL-SCNC: 23 MMOL/L — SIGNIFICANT CHANGE UP (ref 22–31)
CREAT SERPL-MCNC: 0.72 MG/DL — SIGNIFICANT CHANGE UP (ref 0.5–1.3)
EGFR: 92 ML/MIN/1.73M2 — SIGNIFICANT CHANGE UP
EOSINOPHIL # BLD AUTO: 0.07 K/UL — SIGNIFICANT CHANGE UP (ref 0–0.5)
EOSINOPHIL NFR BLD AUTO: 0.4 % — SIGNIFICANT CHANGE UP (ref 0–6)
GLUCOSE SERPL-MCNC: 121 MG/DL — HIGH (ref 70–99)
HCT VFR BLD CALC: 35.1 % — LOW (ref 39–50)
HGB BLD-MCNC: 11.5 G/DL — LOW (ref 13–17)
IMM GRANULOCYTES NFR BLD AUTO: 0.8 % — SIGNIFICANT CHANGE UP (ref 0–0.9)
LYMPHOCYTES # BLD AUTO: 0.68 K/UL — LOW (ref 1–3.3)
LYMPHOCYTES # BLD AUTO: 4.3 % — LOW (ref 13–44)
MCHC RBC-ENTMCNC: 32.5 PG — SIGNIFICANT CHANGE UP (ref 27–34)
MCHC RBC-ENTMCNC: 32.8 GM/DL — SIGNIFICANT CHANGE UP (ref 32–36)
MCV RBC AUTO: 99.2 FL — SIGNIFICANT CHANGE UP (ref 80–100)
MONOCYTES # BLD AUTO: 0.92 K/UL — HIGH (ref 0–0.9)
MONOCYTES NFR BLD AUTO: 5.9 % — SIGNIFICANT CHANGE UP (ref 2–14)
NEUTROPHILS # BLD AUTO: 13.89 K/UL — HIGH (ref 1.8–7.4)
NEUTROPHILS NFR BLD AUTO: 88.5 % — HIGH (ref 43–77)
PLATELET # BLD AUTO: 126 K/UL — LOW (ref 150–400)
POTASSIUM SERPL-MCNC: 3.8 MMOL/L — SIGNIFICANT CHANGE UP (ref 3.5–5.3)
POTASSIUM SERPL-SCNC: 3.8 MMOL/L — SIGNIFICANT CHANGE UP (ref 3.5–5.3)
RBC # BLD: 3.54 M/UL — LOW (ref 4.2–5.8)
RBC # FLD: 14.1 % — SIGNIFICANT CHANGE UP (ref 10.3–14.5)
SODIUM SERPL-SCNC: 143 MMOL/L — SIGNIFICANT CHANGE UP (ref 135–145)
WBC # BLD: 15.7 K/UL — HIGH (ref 3.8–10.5)
WBC # FLD AUTO: 15.7 K/UL — HIGH (ref 3.8–10.5)

## 2024-03-29 PROCEDURE — 99232 SBSQ HOSP IP/OBS MODERATE 35: CPT

## 2024-03-29 RX ADMIN — Medication 3 MILLIGRAM(S): at 00:02

## 2024-03-29 RX ADMIN — Medication 650 MILLIGRAM(S): at 16:26

## 2024-03-29 RX ADMIN — TAMSULOSIN HYDROCHLORIDE 0.4 MILLIGRAM(S): 0.4 CAPSULE ORAL at 22:04

## 2024-03-29 RX ADMIN — CEFTRIAXONE 1000 MILLIGRAM(S): 500 INJECTION, POWDER, FOR SOLUTION INTRAMUSCULAR; INTRAVENOUS at 05:21

## 2024-03-29 RX ADMIN — LOSARTAN POTASSIUM 25 MILLIGRAM(S): 100 TABLET, FILM COATED ORAL at 09:59

## 2024-03-29 RX ADMIN — Medication 50 MILLIGRAM(S): at 09:59

## 2024-03-29 RX ADMIN — Medication 1 MILLIGRAM(S): at 09:58

## 2024-03-29 RX ADMIN — OXYCODONE HYDROCHLORIDE 10 MILLIGRAM(S): 5 TABLET ORAL at 10:04

## 2024-03-29 RX ADMIN — APIXABAN 5 MILLIGRAM(S): 2.5 TABLET, FILM COATED ORAL at 22:03

## 2024-03-29 RX ADMIN — LEVETIRACETAM 500 MILLIGRAM(S): 250 TABLET, FILM COATED ORAL at 09:59

## 2024-03-29 RX ADMIN — OXYCODONE HYDROCHLORIDE 10 MILLIGRAM(S): 5 TABLET ORAL at 11:02

## 2024-03-29 RX ADMIN — LEVETIRACETAM 500 MILLIGRAM(S): 250 TABLET, FILM COATED ORAL at 22:04

## 2024-03-29 RX ADMIN — APIXABAN 5 MILLIGRAM(S): 2.5 TABLET, FILM COATED ORAL at 09:58

## 2024-03-29 RX ADMIN — ATORVASTATIN CALCIUM 80 MILLIGRAM(S): 80 TABLET, FILM COATED ORAL at 22:03

## 2024-03-29 RX ADMIN — PREGABALIN 1000 MICROGRAM(S): 225 CAPSULE ORAL at 09:59

## 2024-03-29 RX ADMIN — AMIODARONE HYDROCHLORIDE 100 MILLIGRAM(S): 400 TABLET ORAL at 11:33

## 2024-03-29 RX ADMIN — Medication 650 MILLIGRAM(S): at 15:30

## 2024-03-29 RX ADMIN — Medication 50 MILLIGRAM(S): at 22:04

## 2024-03-29 RX ADMIN — Medication 650 MILLIGRAM(S): at 05:32

## 2024-03-29 NOTE — PROGRESS NOTE ADULT - ASSESSMENT
78 y/o M presents with weakness and AMS     Sepsis POA secondary to E Coli UTI   Acute infectious encephalopathy due to above - now resolved   - Temp 100.4F, HR , WBC 16.42, trend   - UA: positive nitrites, moderate leukocyte esterase, moderate blood, many bacteria   - s/p Ceftriaxone in ER; will continue   - UCx + E Coli, f/u sensitivities   - BCx NGTD x 2   - CT head without acute pathology   - CXR as above - no consolidation   - Trend WBC, monitor for temperatures   - Tylenol for temperatures PRN   - s/p 30 cc/kg of IVF in the ER, d/c 75 ml/hr     Macrocytic anemia   - Vitamin B12: 168, supplement IM for now    Hypertension   - //78, monitor   - c/w home medications     History of CVA with residual Left sided weakness, seizure disorder, psoriatic arthritis, chronic Afib on Eliquis, HTN, cataracts  - c/w home medications    DVT ppx: On Eliquis     GOC: Full code, d/w pt and wife today, wishes to be fully resuscitated     Emergency contact: Tyra Lindsay (wife) [phone]     d/w wife at bedside today  80 y/o M presents with weakness and AMS     Sepsis POA secondary to E Coli UTI   Acute infectious encephalopathy due to above - now resolved   - Temp 100.4F, HR , WBC 16.42, trend   - UA: positive nitrites, moderate leukocyte esterase, moderate blood, many bacteria   - s/p Ceftriaxone in ER; will continue   - UCx + E Coli, f/u sensitivities   - BCx NGTD x 2   - CT head without acute pathology   - CXR as above - no consolidation   - Trend WBC, monitor for temperatures   - Tylenol for temperatures PRN   - s/p 30 cc/kg of IVF in the ER, d/c 75 ml/hr     Macrocytic anemia   - Vitamin B12: 168, supplement IM for now    Hypertension   - //78, monitor   - c/w home medications     History of CVA with residual Left sided weakness, seizure disorder, psoriatic arthritis, chronic Afib on Eliquis, HTN, cataracts  - c/w home medications    DVT ppx: On Eliquis     GOC: Full code, d/w pt and wife today, wishes to be fully resuscitated     Emergency contact: Tyra Lindsay (wife) [phone]     DISPO: D/C tomorrow to [place]

## 2024-03-29 NOTE — PROGRESS NOTE ADULT - SUBJECTIVE AND OBJECTIVE BOX
HPI: 80 y/o M with PMH of CVA with residual Left sided weakness, seizure disorder, psoriatic arthritis, Afib on Eliquis, HTN, cataracts presented with weakness and AMS. The pt is A+Ox2 (person and place) at the time of my evaluation and unsure of why he came to the ER. I left a voicemail for his wife for further information. As per ER documentation, the pt was trying to get out of bed and was disoriented but he was too weak to stand up. Upon speaking to the pt, he states that he has right ankle swelling, pain, chills, and weakness. He also reports increased urination and occasional burning on urination. Denies fevers, chest pain, URI symptoms, shortness of breath, cough, abdominal pain, N/V, diarrhea/constipation. ER course: Temp 100.4F, HR , //78. Labs: WBC 16.42, Hb 12.2 (baseline ~13), .1, neutrophils 79%, INR 2.11, glucose 117. UA: positive nitrites, moderate leukocyte esterase, moderate blood, many bacteria. COVID, influenza A/B, RSV negative.       3/28: Patient seen today, feels well, c/o constipation, no overnight issues reported, UCx: + E Coli  3/29:       REVIEW OF SYSTEMS:    CONSTITUTIONAL: No weakness, fevers or chills  EYES/ENT: No visual changes;  No vertigo or throat pain   NECK: No pain or stiffness  RESPIRATORY: No cough, wheezing, hemoptysis; No shortness of breath  CARDIOVASCULAR: No chest pain or palpitations  GASTROINTESTINAL: No abdominal or epigastric pain. No nausea, vomiting, or hematemesis; No diarrhea, +constipation. No melena or hematochezia.  GENITOURINARY: No dysuria, frequency or hematuria  NEUROLOGICAL: No numbness or weakness  SKIN: No itching, rashes        MEDICATIONS  (STANDING):  aMIOdarone    Tablet 100 milliGRAM(s) Oral daily  apixaban 5 milliGRAM(s) Oral two times a day  atorvastatin 80 milliGRAM(s) Oral at bedtime  cefTRIAXone Injectable. 1000 milliGRAM(s) IV Push every 24 hours  cyanocobalamin Injectable 1000 MICROGram(s) IntraMuscular daily  folic acid 1 milliGRAM(s) Oral daily  levETIRAcetam 500 milliGRAM(s) Oral two times a day  losartan 25 milliGRAM(s) Oral daily  methotrexate 20 milliGRAM(s) Oral <User Schedule>  metoprolol tartrate 50 milliGRAM(s) Oral two times a day  tamsulosin 0.4 milliGRAM(s) Oral at bedtime    MEDICATIONS  (PRN):  acetaminophen     Tablet .. 650 milliGRAM(s) Oral every 6 hours PRN Temp greater or equal to 38C (100.4F), Mild Pain (1 - 3)  aluminum hydroxide/magnesium hydroxide/simethicone Suspension 30 milliLiter(s) Oral every 4 hours PRN Dyspepsia  melatonin 3 milliGRAM(s) Oral at bedtime PRN Insomnia  ondansetron Injectable 4 milliGRAM(s) IV Push every 6 hours PRN Nausea and/or Vomiting  oxyCODONE    IR 10 milliGRAM(s) Oral every 8 hours PRN for mod to severe pain  polyethylene glycol 3350 17 Gram(s) Oral daily PRN Constipation      Vital Signs Last 24 Hrs  T(C): 36.3 (29 Mar 2024 09:13), Max: 36.5 (28 Mar 2024 16:22)  T(F): 97.3 (29 Mar 2024 09:13), Max: 97.7 (28 Mar 2024 16:22)  HR: 78 (29 Mar 2024 09:13) (78 - 80)  BP: 118/61 (29 Mar 2024 09:13) (118/61 - 164/70)  RR: 19 (29 Mar 2024 09:13) (18 - 19)  SpO2: 100% (29 Mar 2024 09:13) (96% - 100%)    Parameters below as of 29 Mar 2024 09:13  Patient On (Oxygen Delivery Method): room air      PHYSICAL EXAM:  GENERAL: NAD, lying in bed comfortably  HEAD:  Atraumatic, Normocephalic  EYES: conjunctiva and sclera clear  ENT: Moist mucous membranes  NECK: Supple, No JVD  CHEST/LUNG: Clear to auscultation bilaterally; No rales, rhonchi, wheezing. Unlabored respirations  HEART: Regular rate and rhythm; No murmurs  ABDOMEN: Bowel sounds present; Soft, Nontender, Nondistended.   EXTREMITIES:  2+ Peripheral Pulses, brisk capillary refill. No clubbing, cyanosis, or edema  NERVOUS SYSTEM:  Alert & Oriented X3, speech clear. No deficits   MSK: FROM all 4 extremities, full and equal strength          LABS:                                     11.5   15.70 )-----------( 126      ( 29 Mar 2024 07:42 )             35.1   03-29    143  |  114<H>  |  14  ----------------------------<  121<H>  3.8   |  23  |  0.72    Ca    8.6      29 Mar 2024 07:42                Urinalysis Basic - ( 28 Mar 2024 07:04 )    Color: x / Appearance: x / SG: x / pH: x  Gluc: 101 mg/dL / Ketone: x  / Bili: x / Urobili: x   Blood: x / Protein: x / Nitrite: x   Leuk Esterase: x / RBC: x / WBC x   Sq Epi: x / Non Sq Epi: x / Bacteria: x        RADIOLOGY:    < from: CT Head No Cont (03.27.24 @ 08:37) >  FINDINGS:    Encephalomalacia and gliosis in the right parietal and temporal lobes.   There is no acute intracranial hemorrhage or mass effect. There are areas   of hypodensity in the bilateral hemispheric white matter suggesting white   matter microvascular ischemic change. The ventricles and sulci are normal   in size for patient's age.    There is no extraaxial fluid collection.    There is no displaced calvarial fracture. Status post bilateral   intraocular lens implants. The visualized portions of the paranasal   sinuses are well aerated. The mastoid air cells are well aerated.      IMPRESSION: No acute intracranial hemorrhage or mass effect.      < from: Xray Chest 1 View-PORTABLE IMMEDIATE (03.27.24 @ 04:22) >  INTERPRETATION:  Sepsis.    AP chest. Prior dated 6/16/2022.    IMPRESSION: Cardiomegaly. Elevation right hemidiaphragm. Trace right   pleural effusion. No focal consolidation.           HPI: 80 y/o M with PMH of CVA with residual Left sided weakness, seizure disorder, psoriatic arthritis, Afib on Eliquis, HTN, cataracts presented with weakness and AMS. The pt is A+Ox2 (person and place) at the time of my evaluation and unsure of why he came to the ER. I left a voicemail for his wife for further information. As per ER documentation, the pt was trying to get out of bed and was disoriented but he was too weak to stand up. Upon speaking to the pt, he states that he has right ankle swelling, pain, chills, and weakness. He also reports increased urination and occasional burning on urination. Denies fevers, chest pain, URI symptoms, shortness of breath, cough, abdominal pain, N/V, diarrhea/constipation. ER course: Temp 100.4F, HR , //78. Labs: WBC 16.42, Hb 12.2 (baseline ~13), .1, neutrophils 79%, INR 2.11, glucose 117. UA: positive nitrites, moderate leukocyte esterase, moderate blood, many bacteria. COVID, influenza A/B, RSV negative.       3/28: Patient seen today, feels well, c/o constipation, no overnight issues reported, UCx: + E Coli  3/29: Patient seen today, feels well, no complaints, leukocytosis improving.       REVIEW OF SYSTEMS:    CONSTITUTIONAL: No weakness, fevers or chills  EYES/ENT: No visual changes;  No vertigo or throat pain   NECK: No pain or stiffness  RESPIRATORY: No cough, wheezing, hemoptysis; No shortness of breath  CARDIOVASCULAR: No chest pain or palpitations  GASTROINTESTINAL: No abdominal or epigastric pain. No nausea, vomiting, or hematemesis; No diarrhea, +constipation. No melena or hematochezia.  GENITOURINARY: No dysuria, frequency or hematuria  NEUROLOGICAL: No numbness or weakness  SKIN: No itching, rashes        MEDICATIONS  (STANDING):  aMIOdarone    Tablet 100 milliGRAM(s) Oral daily  apixaban 5 milliGRAM(s) Oral two times a day  atorvastatin 80 milliGRAM(s) Oral at bedtime  cefTRIAXone Injectable. 1000 milliGRAM(s) IV Push every 24 hours  cyanocobalamin Injectable 1000 MICROGram(s) IntraMuscular daily  folic acid 1 milliGRAM(s) Oral daily  levETIRAcetam 500 milliGRAM(s) Oral two times a day  losartan 25 milliGRAM(s) Oral daily  methotrexate 20 milliGRAM(s) Oral <User Schedule>  metoprolol tartrate 50 milliGRAM(s) Oral two times a day  tamsulosin 0.4 milliGRAM(s) Oral at bedtime    MEDICATIONS  (PRN):  acetaminophen     Tablet .. 650 milliGRAM(s) Oral every 6 hours PRN Temp greater or equal to 38C (100.4F), Mild Pain (1 - 3)  aluminum hydroxide/magnesium hydroxide/simethicone Suspension 30 milliLiter(s) Oral every 4 hours PRN Dyspepsia  melatonin 3 milliGRAM(s) Oral at bedtime PRN Insomnia  ondansetron Injectable 4 milliGRAM(s) IV Push every 6 hours PRN Nausea and/or Vomiting  oxyCODONE    IR 10 milliGRAM(s) Oral every 8 hours PRN for mod to severe pain  polyethylene glycol 3350 17 Gram(s) Oral daily PRN Constipation    Vital Signs Last 24 Hrs  T(C): 36.3 (29 Mar 2024 09:13), Max: 36.5 (28 Mar 2024 16:22)  T(F): 97.3 (29 Mar 2024 09:13), Max: 97.7 (28 Mar 2024 16:22)  HR: 78 (29 Mar 2024 09:13) (78 - 80)  BP: 118/61 (29 Mar 2024 09:13) (118/61 - 164/70)  RR: 19 (29 Mar 2024 09:13) (18 - 19)  SpO2: 100% (29 Mar 2024 09:13) (96% - 100%)    Parameters below as of 29 Mar 2024 09:13  Patient On (Oxygen Delivery Method): room air          PHYSICAL EXAM:  GENERAL: NAD, lying in bed comfortably  HEAD:  Atraumatic, Normocephalic  EYES: conjunctiva and sclera clear  ENT: Moist mucous membranes  NECK: Supple, No JVD  CHEST/LUNG: Clear to auscultation bilaterally; No rales, rhonchi, wheezing. Unlabored respirations  HEART: Regular rate and rhythm; No murmurs  ABDOMEN: Bowel sounds present; Soft, Nontender, Nondistended.   EXTREMITIES:  2+ Peripheral Pulses, brisk capillary refill. No clubbing, cyanosis, or edema  NERVOUS SYSTEM:  Alert & Oriented X3, speech clear. No deficits   MSK: FROM all 4 extremities, full and equal strength          LABS:                                     11.5   15.70 )-----------( 126      ( 29 Mar 2024 07:42 )             35.1   03-29    143  |  114<H>  |  14  ----------------------------<  121<H>  3.8   |  23  |  0.72    Ca    8.6      29 Mar 2024 07:42                Urinalysis Basic - ( 28 Mar 2024 07:04 )    Color: x / Appearance: x / SG: x / pH: x  Gluc: 101 mg/dL / Ketone: x  / Bili: x / Urobili: x   Blood: x / Protein: x / Nitrite: x   Leuk Esterase: x / RBC: x / WBC x   Sq Epi: x / Non Sq Epi: x / Bacteria: x        RADIOLOGY:    < from: CT Head No Cont (03.27.24 @ 08:37) >  FINDINGS:    Encephalomalacia and gliosis in the right parietal and temporal lobes.   There is no acute intracranial hemorrhage or mass effect. There are areas   of hypodensity in the bilateral hemispheric white matter suggesting white   matter microvascular ischemic change. The ventricles and sulci are normal   in size for patient's age.    There is no extraaxial fluid collection.    There is no displaced calvarial fracture. Status post bilateral   intraocular lens implants. The visualized portions of the paranasal   sinuses are well aerated. The mastoid air cells are well aerated.      IMPRESSION: No acute intracranial hemorrhage or mass effect.      < from: Xray Chest 1 View-PORTABLE IMMEDIATE (03.27.24 @ 04:22) >  INTERPRETATION:  Sepsis.    AP chest. Prior dated 6/16/2022.    IMPRESSION: Cardiomegaly. Elevation right hemidiaphragm. Trace right   pleural effusion. No focal consolidation.

## 2024-03-29 NOTE — PROGRESS NOTE ADULT - NUTRITIONAL ASSESSMENT
This patient has been assessed with a concern for Malnutrition and has been determined to have a diagnosis/diagnoses of Moderate protein-calorie malnutrition.    This patient is being managed with:   Diet DASH/TLC-  Sodium & Cholesterol Restricted  Entered: Mar 27 2024  7:11AM  
This patient has been assessed with a concern for Malnutrition and has been determined to have a diagnosis/diagnoses of Moderate protein-calorie malnutrition.    This patient is being managed with:   Diet DASH/TLC-  Sodium & Cholesterol Restricted  Entered: Mar 27 2024  7:11AM

## 2024-03-30 ENCOUNTER — TRANSCRIPTION ENCOUNTER (OUTPATIENT)
Age: 79
End: 2024-03-30

## 2024-03-30 VITALS — DIASTOLIC BLOOD PRESSURE: 66 MMHG | HEART RATE: 85 BPM | SYSTOLIC BLOOD PRESSURE: 132 MMHG

## 2024-03-30 LAB
-  AMOXICILLIN/CLAVULANIC ACID: SIGNIFICANT CHANGE UP
-  AMPICILLIN/SULBACTAM: SIGNIFICANT CHANGE UP
-  AMPICILLIN: SIGNIFICANT CHANGE UP
-  AZTREONAM: SIGNIFICANT CHANGE UP
-  CEFAZOLIN: SIGNIFICANT CHANGE UP
-  CEFEPIME: SIGNIFICANT CHANGE UP
-  CEFOXITIN: SIGNIFICANT CHANGE UP
-  CEFTRIAXONE: SIGNIFICANT CHANGE UP
-  CEFUROXIME: SIGNIFICANT CHANGE UP
-  CIPROFLOXACIN: SIGNIFICANT CHANGE UP
-  ERTAPENEM: SIGNIFICANT CHANGE UP
-  GENTAMICIN: SIGNIFICANT CHANGE UP
-  IMIPENEM: SIGNIFICANT CHANGE UP
-  LEVOFLOXACIN: SIGNIFICANT CHANGE UP
-  MEROPENEM: SIGNIFICANT CHANGE UP
-  NITROFURANTOIN: SIGNIFICANT CHANGE UP
-  PIPERACILLIN/TAZOBACTAM: SIGNIFICANT CHANGE UP
-  TOBRAMYCIN: SIGNIFICANT CHANGE UP
-  TRIMETHOPRIM/SULFAMETHOXAZOLE: SIGNIFICANT CHANGE UP
CULTURE RESULTS: ABNORMAL
HCT VFR BLD CALC: 34 % — LOW (ref 39–50)
HGB BLD-MCNC: 11.2 G/DL — LOW (ref 13–17)
MCHC RBC-ENTMCNC: 32.8 PG — SIGNIFICANT CHANGE UP (ref 27–34)
MCHC RBC-ENTMCNC: 32.9 GM/DL — SIGNIFICANT CHANGE UP (ref 32–36)
MCV RBC AUTO: 99.7 FL — SIGNIFICANT CHANGE UP (ref 80–100)
METHOD TYPE: SIGNIFICANT CHANGE UP
ORGANISM # SPEC MICROSCOPIC CNT: ABNORMAL
ORGANISM # SPEC MICROSCOPIC CNT: SIGNIFICANT CHANGE UP
PLATELET # BLD AUTO: 144 K/UL — LOW (ref 150–400)
RBC # BLD: 3.41 M/UL — LOW (ref 4.2–5.8)
RBC # FLD: 14.1 % — SIGNIFICANT CHANGE UP (ref 10.3–14.5)
SARS-COV-2 RNA SPEC QL NAA+PROBE: SIGNIFICANT CHANGE UP
SPECIMEN SOURCE: SIGNIFICANT CHANGE UP
WBC # BLD: 11.19 K/UL — HIGH (ref 3.8–10.5)
WBC # FLD AUTO: 11.19 K/UL — HIGH (ref 3.8–10.5)

## 2024-03-30 PROCEDURE — 99239 HOSP IP/OBS DSCHRG MGMT >30: CPT

## 2024-03-30 RX ORDER — CEPHALEXIN 500 MG
1 CAPSULE ORAL
Qty: 6 | Refills: 0
Start: 2024-03-30 | End: 2024-04-01

## 2024-03-30 RX ORDER — PREGABALIN 225 MG/1
1 CAPSULE ORAL
Qty: 1 | Refills: 0
Start: 2024-03-30 | End: 2024-04-28

## 2024-03-30 RX ORDER — PREGABALIN 225 MG/1
1000 CAPSULE ORAL
Qty: 8 | Refills: 0
Start: 2024-03-30 | End: 2024-04-06

## 2024-03-30 RX ORDER — ASPIRIN/CALCIUM CARB/MAGNESIUM 324 MG
1 TABLET ORAL
Qty: 0 | Refills: 0 | DISCHARGE

## 2024-03-30 RX ADMIN — PREGABALIN 1000 MICROGRAM(S): 225 CAPSULE ORAL at 11:02

## 2024-03-30 RX ADMIN — LEVETIRACETAM 500 MILLIGRAM(S): 250 TABLET, FILM COATED ORAL at 09:50

## 2024-03-30 RX ADMIN — AMIODARONE HYDROCHLORIDE 100 MILLIGRAM(S): 400 TABLET ORAL at 09:51

## 2024-03-30 RX ADMIN — Medication 650 MILLIGRAM(S): at 13:05

## 2024-03-30 RX ADMIN — Medication 50 MILLIGRAM(S): at 09:50

## 2024-03-30 RX ADMIN — APIXABAN 5 MILLIGRAM(S): 2.5 TABLET, FILM COATED ORAL at 09:51

## 2024-03-30 RX ADMIN — Medication 1 MILLIGRAM(S): at 09:50

## 2024-03-30 RX ADMIN — Medication 650 MILLIGRAM(S): at 13:45

## 2024-03-30 RX ADMIN — LOSARTAN POTASSIUM 25 MILLIGRAM(S): 100 TABLET, FILM COATED ORAL at 09:50

## 2024-03-30 RX ADMIN — CEFTRIAXONE 1000 MILLIGRAM(S): 500 INJECTION, POWDER, FOR SOLUTION INTRAMUSCULAR; INTRAVENOUS at 05:30

## 2024-03-30 NOTE — DISCHARGE NOTE PROVIDER - DETAILS OF MALNUTRITION DIAGNOSIS/DIAGNOSES
This patient has been assessed with a concern for Malnutrition and was treated during this hospitalization for the following Nutrition diagnosis/diagnoses:     -  03/27/2024: Moderate protein-calorie malnutrition

## 2024-03-30 NOTE — DISCHARGE NOTE PROVIDER - NSDCCPCAREPLAN_GEN_ALL_CORE_FT
PRINCIPAL DISCHARGE DIAGNOSIS  Diagnosis: Sepsis secondary to UTI  Assessment and Plan of Treatment: Admitted for sepsis due to UTI, found to have urine culture with E Coli, treated with ceftriaxone, continue with kelflex for 3 more days.      SECONDARY DISCHARGE DIAGNOSES  Diagnosis: Vitamin B12 deficiency  Assessment and Plan of Treatment: Found to have severe vitamin b12 deficiency, Vitamin B 12 level was 168, continue with Vitamin B12 IM injections once a week for 8 weeks, can start 3/31/2024. Recommend Vitamin B12 sublingual and close follow up for repeat vitamin B12 levels with your primary care physician.    Diagnosis: Acute UTI  Assessment and Plan of Treatment:

## 2024-03-30 NOTE — DISCHARGE NOTE PROVIDER - ATTENDING DISCHARGE PHYSICAL EXAMINATION:
Patient seen and examined today, awake, alert and oriented x 3, feels much better     Vital Signs Last 24 Hrs  T(C): 36.8 (30 Mar 2024 07:31), Max: 37.1 (29 Mar 2024 21:08)  T(F): 98.2 (30 Mar 2024 07:31), Max: 98.8 (29 Mar 2024 21:08)  HR: 85 (30 Mar 2024 09:45) (59 - 85)  BP: 132/66 (30 Mar 2024 09:45) (117/65 - 142/76)  BP(mean): --  RR: 19 (30 Mar 2024 07:31) (18 - 19)  SpO2: 98% (30 Mar 2024 07:31) (98% - 98%)    Parameters below as of 30 Mar 2024 07:31  Patient On (Oxygen Delivery Method): room air    PHYSICAL EXAM:  GENERAL: NAD  CHEST/LUNG: Clear to auscultation bilaterally; No rales, rhonchi, wheezing. Unlabored respirations  HEART: Regular rate and rhythm; No murmurs  ABDOMEN: Bowel sounds present; Soft, Nontender, Nondistended.   EXTREMITIES:  2+ Peripheral Pulses, brisk capillary refill. No clubbing, cyanosis, or edema  NERVOUS SYSTEM:  Alert & Oriented X3, speech clear. No deficits   MSK: FROM all 4 extremities, full and equal strength

## 2024-03-30 NOTE — DISCHARGE NOTE PROVIDER - CARE PROVIDER_API CALL
Bruno Palacio  Internal Medicine  [address] [place] [address]  Phone: [phone]  Fax: [phone]  Follow Up Time: 2 weeks

## 2024-03-30 NOTE — DISCHARGE NOTE PROVIDER - NSDCMRMEDTOKEN_GEN_ALL_CORE_FT
acetaminophen 325 mg oral tablet: 2 tab(s) orally every 6 hours, As needed, Temp greater or equal to 38C (100.4F)  amiodarone 100 mg oral tablet: 1 tab(s) orally once a day    atorvastatin 80 mg oral tablet: 1 tab(s) orally once a day (at bedtime)  cephalexin 500 mg oral tablet: 1 tab(s) orally 2 times a day  cyanocobalamin 1000 mcg sublingual tablet: 1 tab(s) sublingually once a day  cyanocobalamin 1000 mcg/mL injectable solution: 1,000 microgram(s) intramuscularly once a week  Eliquis 5 mg oral tablet: 1 tab(s) orally 2 times a day    folic acid 1 mg oral tablet: 1 tab(s) orally once a day    levETIRAcetam 250 mg oral tablet: 2 tab(s) orally 2 times a day    losartan 25 mg oral tablet: 1 tab(s) orally once a day  methotrexate 2.5 mg oral tablet: 8 tab(s) orally once a week on tuesdays    Metoprolol Tartrate 50 mg oral tablet: 1 tab(s) orally 2 times a day    oxyCODONE 10 mg oral tablet: 1 tab(s) orally every 8 hours as needed for  mod to severe pain  tamsulosin 0.4 mg oral capsule: 1 cap(s) orally once a day (at bedtime)

## 2024-03-30 NOTE — DISCHARGE NOTE PROVIDER - HOSPITAL COURSE
78 y/o M with PMH of CVA with residual Left sided weakness, seizure disorder, psoriatic arthritis, Afib on Eliquis, HTN, cataracts presented with weakness and AMS. The pt is A+Ox2 (person and place) at the time of my evaluation and unsure of why he came to the ER. I left a voicemail for his wife for further information. As per ER documentation, the pt was trying to get out of bed and was disoriented but he was too weak to stand up. Upon speaking to the pt, he states that he has right ankle swelling, pain, chills, and weakness. He also reports increased urination and occasional burning on urination. Denies fevers, chest pain, URI symptoms, shortness of breath, cough, abdominal pain, N/V, diarrhea/constipation. ER course: Temp 100.4F, HR , //78. Labs: WBC 16.42, Hb 12.2 (baseline ~13), .1, neutrophils 79%, INR 2.11, glucose 117. UA: positive nitrites, moderate leukocyte esterase, moderate blood, many bacteria. COVID, influenza A/B, RSV negative.     Sepsis POA secondary to E Coli UTI   Acute infectious encephalopathy due to above - now resolved   - Temp 100.4F, HR , WBC 16.42, trend   - UA: positive nitrites, moderate leukocyte esterase, moderate blood, many bacteria   - s/p Ceftriaxone in ER; will continue #4 days   - UCx + E Coli, f/u sensitivities   - BCx NGTD x 2   - CT head without acute pathology   - CXR as above - no consolidation   - Trend WBC, monitor for temperatures   - Tylenol for temperatures PRN   - s/p 30 cc/kg of IVF in the ER, d/c 75 ml/hr     Macrocytic anemia   - Vitamin B12: 168, supplement IM for now    Hypertension   - //78, monitor   - c/w home medications     History of CVA with residual Left sided weakness, seizure disorder, psoriatic arthritis, chronic Afib on Eliquis, HTN, cataracts  - c/w home medications    DVT ppx: On Eliquis     GOC: Full code, d/w pt and wife today, wishes to be fully resuscitated     Emergency contact: Tyra Lindsay (wife) [phone]

## 2024-03-30 NOTE — DISCHARGE NOTE NURSING/CASE MANAGEMENT/SOCIAL WORK - PATIENT PORTAL LINK FT
You can access the FollowMyHealth Patient Portal offered by [facility] by registering at the following website: [email or web address]. By joining [facility]’s FollowMyHealth portal, you will also be able to view your health information using other applications (apps) compatible with our system.

## 2024-04-05 DIAGNOSIS — I48.0 PAROXYSMAL ATRIAL FIBRILLATION: ICD-10-CM

## 2024-04-05 DIAGNOSIS — L40.50 ARTHROPATHIC PSORIASIS, UNSPECIFIED: ICD-10-CM

## 2024-04-05 DIAGNOSIS — G40.909 EPILEPSY, UNSPECIFIED, NOT INTRACTABLE, WITHOUT STATUS EPILEPTICUS: ICD-10-CM

## 2024-04-05 DIAGNOSIS — Z79.82 LONG TERM (CURRENT) USE OF ASPIRIN: ICD-10-CM

## 2024-04-05 DIAGNOSIS — Z79.01 LONG TERM (CURRENT) USE OF ANTICOAGULANTS: ICD-10-CM

## 2024-04-05 DIAGNOSIS — D50.9 IRON DEFICIENCY ANEMIA, UNSPECIFIED: ICD-10-CM

## 2024-04-05 DIAGNOSIS — A41.9 SEPSIS, UNSPECIFIED ORGANISM: ICD-10-CM

## 2024-04-05 DIAGNOSIS — B96.20 UNSPECIFIED ESCHERICHIA COLI [E. COLI] AS THE CAUSE OF DISEASES CLASSIFIED ELSEWHERE: ICD-10-CM

## 2024-04-05 DIAGNOSIS — N39.0 URINARY TRACT INFECTION, SITE NOT SPECIFIED: ICD-10-CM

## 2024-04-05 DIAGNOSIS — Z96.651 PRESENCE OF RIGHT ARTIFICIAL KNEE JOINT: ICD-10-CM

## 2024-04-05 DIAGNOSIS — G93.41 METABOLIC ENCEPHALOPATHY: ICD-10-CM

## 2024-04-05 DIAGNOSIS — E44.0 MODERATE PROTEIN-CALORIE MALNUTRITION: ICD-10-CM

## 2024-04-05 DIAGNOSIS — E53.8 DEFICIENCY OF OTHER SPECIFIED B GROUP VITAMINS: ICD-10-CM

## 2024-04-05 DIAGNOSIS — I69.354 HEMIPLEGIA AND HEMIPARESIS FOLLOWING CEREBRAL INFARCTION AFFECTING LEFT NON-DOMINANT SIDE: ICD-10-CM

## 2024-04-19 NOTE — DIETITIAN NUTRITION RISK NOTIFICATION - ETIOLOGY-BASIS
Acute illness or injury Vital Signs Last 24 Hrs  T(C): 36.5 (04-19-24 @ 08:28), Max: 37.1 (04-18-24 @ 19:24)  T(F): 97.7 (04-19-24 @ 08:28), Max: 98.7 (04-18-24 @ 19:24)  HR: --  BP: --  BP(mean): --  RR: --  SpO2: --    Orthostatic VS  04-19-24 @ 08:28  Lying BP: --/-- HR: --  Sitting BP: 111/88 HR: 87  Standing BP: --/-- HR: --  Site: --  Mode: --  Orthostatic VS  04-18-24 @ 19:24  Lying BP: --/-- HR: --  Sitting BP: 146/76 HR: 101  Standing BP: 128/69 HR: 62  Site: --  Mode: --  Orthostatic VS  04-18-24 @ 08:25  Lying BP: --/-- HR: --  Sitting BP: 103/62 HR: 57  Standing BP: 100/67 HR: 64  Site: --  Mode: --  Orthostatic VS  04-17-24 @ 19:08  Lying BP: --/-- HR: --  Sitting BP: 117/75 HR: 57  Standing BP: 117/74 HR: 65  Site: --  Mode: --

## 2024-07-17 NOTE — H&P PST ADULT - HEIGHT IN INCHES
07/17/24 1215   Coping/Psychosocial   Observed Emotional State calm;cooperative   Verbalized Emotional State acceptance   Trust Relationship/Rapport empathic listening provided   Family/Support Persons family   Involvement in Care interacting with patient   Additional Documentation Spiritual Care (Group)   Spiritual Care   Use of Spiritual Resources prayer;spirituality for coping, indicated strong use of   Spiritual Care Source  initiative   Spiritual Care Follow-Up follow-up planned regularly for general support   Response to Spiritual Care receptive of support;thanks expressed   Spiritual Care Interventions supportive conversation provided;prayer support provided   Spiritual Care Visit Type other (see comments)  (Consult)   Spiritual Care Request other (see comments)  (The Pt is with Comfort Care Support)   Receptivity to Spiritual Care visit welcomed        """Status post yag laser capsulotomy OU. Open PC. Healing well.  """ 9

## 2024-08-29 NOTE — ED ADULT TRIAGE NOTE - SPO2 (%)
Last office visit: 7/8/24    Upcoming scheduled visit: none    Last refill given: gabapentin (NEURONTIN) 6/29/24  hydroCHLOROthiazide 4/5/24        Medication refill request denied per protocol   100

## 2024-09-25 NOTE — PHYSICAL THERAPY INITIAL EVALUATION ADULT - STANDING BALANCE: STATIC
Detail Level: Generalized Gentle Skin Care Counseling: I recommended use a gentle skin cleanser when washing the skin. I also recommended application of a moisturizer twice daily. Products with fragrances, preservatives and dyes should be avoided. RW/fair balance

## 2025-01-28 ENCOUNTER — EMERGENCY (EMERGENCY)
Facility: HOSPITAL | Age: 80
LOS: 0 days | Discharge: HOME | End: 2025-01-29
Attending: EMERGENCY MEDICINE
Payer: MEDICARE

## 2025-01-28 VITALS
DIASTOLIC BLOOD PRESSURE: 80 MMHG | TEMPERATURE: 100 F | HEART RATE: 81 BPM | RESPIRATION RATE: 18 BRPM | WEIGHT: 179.9 LBS | OXYGEN SATURATION: 96 % | SYSTOLIC BLOOD PRESSURE: 177 MMHG

## 2025-01-28 DIAGNOSIS — I48.0 PAROXYSMAL ATRIAL FIBRILLATION: ICD-10-CM

## 2025-01-28 DIAGNOSIS — Z98.49 CATARACT EXTRACTION STATUS, UNSPECIFIED EYE: Chronic | ICD-10-CM

## 2025-01-28 DIAGNOSIS — Z96.651 PRESENCE OF RIGHT ARTIFICIAL KNEE JOINT: Chronic | ICD-10-CM

## 2025-01-28 DIAGNOSIS — Z90.89 ACQUIRED ABSENCE OF OTHER ORGANS: Chronic | ICD-10-CM

## 2025-01-28 DIAGNOSIS — R68.83 CHILLS (WITHOUT FEVER): ICD-10-CM

## 2025-01-28 DIAGNOSIS — R42 DIZZINESS AND GIDDINESS: ICD-10-CM

## 2025-01-28 DIAGNOSIS — G40.909 EPILEPSY, UNSPECIFIED, NOT INTRACTABLE, WITHOUT STATUS EPILEPTICUS: ICD-10-CM

## 2025-01-28 DIAGNOSIS — Z86.73 PERSONAL HISTORY OF TRANSIENT ISCHEMIC ATTACK (TIA), AND CEREBRAL INFARCTION WITHOUT RESIDUAL DEFICITS: ICD-10-CM

## 2025-01-28 DIAGNOSIS — R53.1 WEAKNESS: ICD-10-CM

## 2025-01-28 DIAGNOSIS — Z98.1 ARTHRODESIS STATUS: Chronic | ICD-10-CM
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
## 2025-01-28 PROCEDURE — 93005 ELECTROCARDIOGRAM TRACING: CPT

## 2025-01-28 PROCEDURE — 83735 ASSAY OF MAGNESIUM: CPT

## 2025-01-28 PROCEDURE — 85025 COMPLETE CBC W/AUTO DIFF WBC: CPT

## 2025-01-28 PROCEDURE — 80048 BASIC METABOLIC PNL TOTAL CA: CPT

## 2025-01-28 PROCEDURE — 70450 CT HEAD/BRAIN W/O DYE: CPT | Mod: MC

## 2025-01-28 PROCEDURE — 81003 URINALYSIS AUTO W/O SCOPE: CPT

## 2025-01-28 PROCEDURE — 71045 X-RAY EXAM CHEST 1 VIEW: CPT

## 2025-01-28 PROCEDURE — 36000 PLACE NEEDLE IN VEIN: CPT

## 2025-01-28 PROCEDURE — 99285 EMERGENCY DEPT VISIT HI MDM: CPT | Mod: 25

## 2025-01-28 PROCEDURE — 93010 ELECTROCARDIOGRAM REPORT: CPT

## 2025-01-28 PROCEDURE — 82962 GLUCOSE BLOOD TEST: CPT

## 2025-01-28 PROCEDURE — 99285 EMERGENCY DEPT VISIT HI MDM: CPT

## 2025-01-28 PROCEDURE — 36415 COLL VENOUS BLD VENIPUNCTURE: CPT

## 2025-01-28 NOTE — ED ADULT TRIAGE NOTE - CHIEF COMPLAINT QUOTE
Pt BIBEMS from home with c/o dizziness x few days but worsened over the course of the day. Per EMS and pt, pt has hx of periods of dizziness that comes and goes. denies any other complaints. BEFAST NEGATIVE, pt speaking clear, coherent, no facial droop or weakness noted. BGM 98.  Alert to name, , and place. NKDA, hx of dizziness. EKG in progress.

## 2025-01-29 VITALS
DIASTOLIC BLOOD PRESSURE: 87 MMHG | RESPIRATION RATE: 18 BRPM | HEART RATE: 85 BPM | SYSTOLIC BLOOD PRESSURE: 164 MMHG | OXYGEN SATURATION: 95 %

## 2025-01-29 LAB
ANION GAP SERPL CALC-SCNC: 6 MMOL/L — SIGNIFICANT CHANGE UP (ref 5–17)
APPEARANCE UR: CLEAR — SIGNIFICANT CHANGE UP
BASOPHILS # BLD AUTO: 0.05 K/UL — SIGNIFICANT CHANGE UP (ref 0–0.2)
BASOPHILS NFR BLD AUTO: 0.3 % — SIGNIFICANT CHANGE UP (ref 0–2)
BILIRUB UR-MCNC: NEGATIVE — SIGNIFICANT CHANGE UP
BUN SERPL-MCNC: 23 MG/DL — SIGNIFICANT CHANGE UP (ref 7–23)
CALCIUM SERPL-MCNC: 8.3 MG/DL — LOW (ref 8.5–10.1)
CHLORIDE SERPL-SCNC: 108 MMOL/L — SIGNIFICANT CHANGE UP (ref 96–108)
CO2 SERPL-SCNC: 21 MMOL/L — LOW (ref 22–31)
COLOR SPEC: YELLOW — SIGNIFICANT CHANGE UP
CREAT SERPL-MCNC: 1.05 MG/DL — SIGNIFICANT CHANGE UP (ref 0.5–1.3)
DIFF PNL FLD: NEGATIVE — SIGNIFICANT CHANGE UP
EGFR: 71 ML/MIN/1.73M2 — SIGNIFICANT CHANGE UP
EOSINOPHIL # BLD AUTO: 0.2 K/UL — SIGNIFICANT CHANGE UP (ref 0–0.5)
EOSINOPHIL NFR BLD AUTO: 1.4 % — SIGNIFICANT CHANGE UP (ref 0–6)
GLUCOSE SERPL-MCNC: 92 MG/DL — SIGNIFICANT CHANGE UP (ref 70–99)
GLUCOSE UR QL: NEGATIVE MG/DL — SIGNIFICANT CHANGE UP
HCT VFR BLD CALC: 40.9 % — SIGNIFICANT CHANGE UP (ref 39–50)
HGB BLD-MCNC: 13.3 G/DL — SIGNIFICANT CHANGE UP (ref 13–17)
IMM GRANULOCYTES NFR BLD AUTO: 0.4 % — SIGNIFICANT CHANGE UP (ref 0–0.9)
KETONES UR-MCNC: NEGATIVE MG/DL — SIGNIFICANT CHANGE UP
LEUKOCYTE ESTERASE UR-ACNC: NEGATIVE — SIGNIFICANT CHANGE UP
LYMPHOCYTES # BLD AUTO: 1.89 K/UL — SIGNIFICANT CHANGE UP (ref 1–3.3)
LYMPHOCYTES # BLD AUTO: 13.1 % — SIGNIFICANT CHANGE UP (ref 13–44)
MAGNESIUM SERPL-MCNC: 1.7 MG/DL — SIGNIFICANT CHANGE UP (ref 1.6–2.6)
MCHC RBC-ENTMCNC: 32.4 PG — SIGNIFICANT CHANGE UP (ref 27–34)
MCHC RBC-ENTMCNC: 32.5 G/DL — SIGNIFICANT CHANGE UP (ref 32–36)
MCV RBC AUTO: 99.5 FL — SIGNIFICANT CHANGE UP (ref 80–100)
MONOCYTES # BLD AUTO: 1.68 K/UL — HIGH (ref 0–0.9)
MONOCYTES NFR BLD AUTO: 11.7 % — SIGNIFICANT CHANGE UP (ref 2–14)
NEUTROPHILS # BLD AUTO: 10.53 K/UL — HIGH (ref 1.8–7.4)
NEUTROPHILS NFR BLD AUTO: 73.1 % — SIGNIFICANT CHANGE UP (ref 43–77)
NITRITE UR-MCNC: NEGATIVE — SIGNIFICANT CHANGE UP
PH UR: 5.5 — SIGNIFICANT CHANGE UP (ref 5–8)
PLATELET # BLD AUTO: 128 K/UL — LOW (ref 150–400)
POTASSIUM SERPL-MCNC: 5.1 MMOL/L — SIGNIFICANT CHANGE UP (ref 3.5–5.3)
POTASSIUM SERPL-SCNC: 5.1 MMOL/L — SIGNIFICANT CHANGE UP (ref 3.5–5.3)
PROT UR-MCNC: NEGATIVE MG/DL — SIGNIFICANT CHANGE UP
RBC # BLD: 4.11 M/UL — LOW (ref 4.2–5.8)
RBC # FLD: 13.5 % — SIGNIFICANT CHANGE UP (ref 10.3–14.5)
SODIUM SERPL-SCNC: 135 MMOL/L — SIGNIFICANT CHANGE UP (ref 135–145)
SP GR SPEC: 1.02 — SIGNIFICANT CHANGE UP (ref 1–1.03)
UROBILINOGEN FLD QL: 0.2 MG/DL — SIGNIFICANT CHANGE UP (ref 0.2–1)
WBC # BLD: 14.41 K/UL — HIGH (ref 3.8–10.5)
WBC # FLD AUTO: 14.41 K/UL — HIGH (ref 3.8–10.5)

## 2025-01-29 PROCEDURE — 70450 CT HEAD/BRAIN W/O DYE: CPT | Mod: 26

## 2025-01-29 PROCEDURE — 71045 X-RAY EXAM CHEST 1 VIEW: CPT | Mod: 26

## 2025-01-29 RX ORDER — SODIUM CHLORIDE 9 MG/ML
1000 INJECTION, SOLUTION INTRAMUSCULAR; INTRAVENOUS; SUBCUTANEOUS ONCE
Refills: 0 | Status: COMPLETED | OUTPATIENT
Start: 2025-01-29 | End: 2025-01-29

## 2025-01-29 RX ADMIN — SODIUM CHLORIDE 2000 MILLILITER(S): 9 INJECTION, SOLUTION INTRAMUSCULAR; INTRAVENOUS; SUBCUTANEOUS at 01:03

## 2025-01-29 NOTE — ED ADULT NURSE NOTE - NSFALLHARMRISKINTERV_ED_ALL_ED
Assistance OOB with selected safe patient handling equipment if applicable/Assistance with ambulation/Communicate risk of Fall with Harm to all staff, patient, and family/Encourage patient to sit up slowly, dangle for a short time, stand at bedside before walking/Monitor gait and stability/Orthostatic vital signs/Provide patient with walking aids/Provide visual cue: red socks, yellow wristband, yellow gown, etc/Reinforce activity limits and safety measures with patient and family/Bed in lowest position, wheels locked, appropriate side rails in place/Call bell, personal items and telephone in reach/Instruct patient to call for assistance before getting out of bed/chair/stretcher/Non-slip footwear applied when patient is off stretcher/[place] to call system/Physically safe environment - no spills, clutter or unnecessary equipment/Purposeful Proactive Rounding/Room/bathroom lighting operational, light cord in reach

## 2025-01-29 NOTE — ED PROVIDER NOTE - PHYSICAL EXAMINATION
CONSTITUTIONAL: Well appearing, awake, alert, oriented to person, place, time/situation and in no apparent distress.  · ENMT: Airway patent, Nasal mucosa clear. Mouth with normal mucosa. Throat has no vesicles, no oropharyngeal exudates and uvula is midline.  · EYES: Clear bilaterally, pupils equal, round and reactive to light.  · CARDIAC: Normal rate, regular rhythm.  Heart sounds S1, S2.  No murmurs, rubs or gallops.  · RESPIRATORY: Breath sounds clear and equal bilaterally.  · GASTROINTESTINAL: Abdomen soft, non-tender, no guarding.  · MUSCULOSKELETAL: Spine appears normal, range of motion is not limited, no muscle or joint tenderness  · NEUROLOGICAL: Alert and oriented, no focal deficits, no motor or sensory deficits.  · SKIN: Skin normal color for race, warm, dry and intact. No evidence of rash
Statement Selected

## 2025-01-29 NOTE — ED PROVIDER NOTE - CLINICAL SUMMARY MEDICAL DECISION MAKING FREE TEXT BOX
80-year-old male with history of paroxysmal A-fib, CVA, seizure disorder, multiple prior urinary tract infections most recently admitted in March 2024 presents for evaluation of dizziness described as lightheadedness that has been present for over a week with a fall recently without head strike.  Patient states that he was able to assist himself to the ground.  Patient notes that dizziness is increased when he stands up.  Patient also complains of chills but denies any fever.  Patient denies any chest pain or shortness of breath.  Patient is currently awake, alert and oriented x 3 in no apparent distress.  Patient is not currently meeting SIRS criteria and is afebrile at triage.  Will get basic labs to include CBC, CMP.  Will obtain EKG to rule out ACS.  Will get CT of the head to rule out intracranial hemorrhage will get urinalysis with reflex to culture to rule out UTI.  Will administer IV fluids and reassess.

## 2025-01-29 NOTE — ED PROVIDER NOTE - PATIENT PORTAL LINK FT
You can access the FollowMyHealth Patient Portal offered by [facility] by registering at the following website: [email or web address]. By joining [employer]’s FollowMyHealth portal, you will also be able to view your health information using other applications (apps) compatible with our system.

## 2025-07-23 NOTE — H&P ADULT - NSICDXPASTSURGICALHX_GEN_ALL_CORE_FT
Called patient's son Vas to discuss medication changes.    Cynthia Wong, KOSTA - Margaret Dean RN  Labs and CHF clinic note reviewed  Stop potassium supplement  Follow up BMP in 1 week  Follow up in CHF clinic as scheduled    Vas expressed verbal understanding and repeated orders back correctly. Encouraged to call the clinic back with any questions.      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